# Patient Record
Sex: FEMALE | Race: WHITE | NOT HISPANIC OR LATINO | Employment: FULL TIME | URBAN - METROPOLITAN AREA
[De-identification: names, ages, dates, MRNs, and addresses within clinical notes are randomized per-mention and may not be internally consistent; named-entity substitution may affect disease eponyms.]

---

## 2017-03-13 ENCOUNTER — TELEPHONE (OUTPATIENT)
Dept: OBSTETRICS AND GYNECOLOGY | Age: 27
End: 2017-03-13

## 2017-03-13 NOTE — TELEPHONE ENCOUNTER
----- Message from Claudia Fong sent at 3/13/2017  8:55 AM EDT -----  Ninoska pt    Pt has been experiencing lower abdomen pain/ extreme cramping for about a week now. She says her breast are now having sharp pains in them and feel very swollen/full. She has not taken a pregnancy test because she is not sexually active. She has no history of ovarian cyst.  She went to the urgent care and they are treating it as a bladder infection. Please advise     Pt#- [phone]

## 2017-03-15 ENCOUNTER — OFFICE VISIT (OUTPATIENT)
Dept: OBSTETRICS AND GYNECOLOGY | Age: 27
End: 2017-03-15

## 2017-03-15 VITALS
DIASTOLIC BLOOD PRESSURE: 84 MMHG | BODY MASS INDEX: 20.41 KG/M2 | WEIGHT: 127 LBS | HEIGHT: 66 IN | SYSTOLIC BLOOD PRESSURE: 128 MMHG

## 2017-03-15 DIAGNOSIS — Z80.41 FAMILY HISTORY OF OVARIAN CANCER: ICD-10-CM

## 2017-03-15 DIAGNOSIS — R10.84 GENERALIZED ABDOMINAL PAIN: Primary | ICD-10-CM

## 2017-03-15 DIAGNOSIS — Z11.3 SCREEN FOR STD (SEXUALLY TRANSMITTED DISEASE): ICD-10-CM

## 2017-03-15 DIAGNOSIS — L65.9 HAIR LOSS: ICD-10-CM

## 2017-03-15 DIAGNOSIS — K59.09 OTHER CONSTIPATION: ICD-10-CM

## 2017-03-15 LAB
B-HCG UR QL: NEGATIVE
BASOPHILS # BLD AUTO: 0.04 10*3/MM3 (ref 0–0.2)
BASOPHILS NFR BLD AUTO: 0.5 % (ref 0–1.5)
BILIRUB BLD-MCNC: NEGATIVE MG/DL
CLARITY, POC: CLEAR
COLOR UR: YELLOW
EOSINOPHIL # BLD AUTO: 0.13 10*3/MM3 (ref 0–0.7)
EOSINOPHIL NFR BLD AUTO: 1.8 % (ref 0.3–6.2)
ERYTHROCYTE [DISTWIDTH] IN BLOOD BY AUTOMATED COUNT: 12.5 % (ref 11.7–13)
GLUCOSE UR STRIP-MCNC: NEGATIVE MG/DL
HCT VFR BLD AUTO: 44.9 % (ref 35.6–45.5)
HGB BLD-MCNC: 15 G/DL (ref 11.9–15.5)
IMM GRANULOCYTES # BLD: 0 10*3/MM3 (ref 0–0.03)
IMM GRANULOCYTES NFR BLD: 0 % (ref 0–0.5)
INTERNAL NEGATIVE CONTROL: NEGATIVE
INTERNAL POSITIVE CONTROL: POSITIVE
KETONES UR QL: ABNORMAL
LEUKOCYTE EST, POC: NEGATIVE
LYMPHOCYTES # BLD AUTO: 2.08 10*3/MM3 (ref 0.9–4.8)
LYMPHOCYTES NFR BLD AUTO: 28.1 % (ref 19.6–45.3)
Lab: NORMAL
MCH RBC QN AUTO: 30.3 PG (ref 26.9–32)
MCHC RBC AUTO-ENTMCNC: 33.4 G/DL (ref 32.4–36.3)
MCV RBC AUTO: 90.7 FL (ref 80.5–98.2)
MONOCYTES # BLD AUTO: 0.66 10*3/MM3 (ref 0.2–1.2)
MONOCYTES NFR BLD AUTO: 8.9 % (ref 5–12)
NEUTROPHILS # BLD AUTO: 4.49 10*3/MM3 (ref 1.9–8.1)
NEUTROPHILS NFR BLD AUTO: 60.7 % (ref 42.7–76)
NITRITE UR-MCNC: NEGATIVE MG/ML
PH UR: 6 [PH] (ref 5–8)
PLATELET # BLD AUTO: 204 10*3/MM3 (ref 140–500)
PROT UR STRIP-MCNC: NEGATIVE MG/DL
RBC # BLD AUTO: 4.95 10*6/MM3 (ref 3.9–5.2)
RBC # UR STRIP: NEGATIVE /UL
SP GR UR: 1.02 (ref 1–1.03)
TSH SERPL DL<=0.005 MIU/L-ACNC: 2.12 MIU/ML (ref 0.27–4.2)
UROBILINOGEN UR QL: NORMAL
WBC # BLD AUTO: 7.4 10*3/MM3 (ref 4.5–10.7)

## 2017-03-15 PROCEDURE — 99213 OFFICE O/P EST LOW 20 MIN: CPT | Performed by: PHYSICIAN ASSISTANT

## 2017-03-15 PROCEDURE — 81002 URINALYSIS NONAUTO W/O SCOPE: CPT | Performed by: PHYSICIAN ASSISTANT

## 2017-03-15 PROCEDURE — 81025 URINE PREGNANCY TEST: CPT | Performed by: PHYSICIAN ASSISTANT

## 2017-03-15 NOTE — PROGRESS NOTES
"Subjective     Chief Complaint   Patient presents with   • Abdominal Pain   • Breast Pain       Bogdan Garcia is a 26 y.o. No obstetric history on file. whose LMP is Patient's last menstrual period was 02/22/2017 (approximate). presents with pelvic pain and cramping, \"like contractions\".  She has had it for 3 wks, since her period ended.  She was seen at  and dxed with UTI and put on macrobid.  She has been taking it.  She then started with breast pain that started recently as well.  She denies risk of pregnancy but did take a UPT and it was negative.  She does note that she has breast enlargement week prior to her period.  She is not due for her period until the end of the month (LMP says 2/22 but she says it actually started end of Feb to beginning of march.      She \"always has problems with her bowels\".  Has BM's q 4-5 days.  No change in her diet.  Does admit to a \"ton of coffee\".         Additional Complaints:  Constipation  Patient complains of constipation. Onset was years ago. Patient has been having rare BM's  per week. Defecation has been Co-Morbid conditions:none. Symptoms have stabilized. Current Health Habits: Eating fiber? yes - with diet, Exercise? yes - occl, Adequate hydration? no. Current over the counter/prescription laxative: none which has been ineffective.        The following portions of the patient's history were reviewed and updated as appropriate:vital signs, allergies, current medications, past medical history, past social history, past surgical history and problem list      Review of Systems   Pertinent items are noted in HPI.     Objective        Visit Vitals   • /84   • Ht 66\" (167.6 cm)   • Wt 127 lb (57.6 kg)   • LMP 02/22/2017 (Approximate)   • Breastfeeding No   • BMI 20.5 kg/m2       Physical Exam    General:   alert, comfortable and no distress   Heart: Not performed today   Lungs: Not performed today.   Breast: Inspection negative, No nipple retraction or dimpling, No " nipple discharge or bleeding, Normal to palpation without dominant masses, dense tissue B, no discrete masses noted   Neck: na   Abdomen: {soft, non-tender. Bowel sounds normal. No masses,  no organomegaly   CVA: Not performed today   Pelvis: Vulva and vagina appear normal. Bimanual exam reveals normal uterus and adnexa.  Cervix: culture obtained   Extremities: Not performed today   Neurologic: negative   Psychiatric: Normal affect, judgement, and mood       Lab Review   Labs: Urine pregnancy test, Urinalysis - with micro     Imaging   Ultrasound - Pelvic Vaginal    Assessment/Plan     ASSESSMENT  1. Generalized abdominal pain    2. Other constipation    3. Hair loss    4. Screen for STD (sexually transmitted disease)    5. Family history of ovarian cancer        PLAN  1.   Orders Placed This Encounter   Procedures   • NuSwab VG+   • TSH   • POC Urinalysis Dipstick, Multipro   • POC Pregnancy, Urine       2. R/o appendicitis, STD exposure.  Already neg for urine infection.  Plan pelvic u/s to eval ovaries and r/o cyst  3. Disc breast tenderness, suspect this is hormonal and advised to track when it occurs in relation to her cycles.  Also encouraged to d/c her coffee.  Call if pain persists and can plan u/s  4. Disc contraception, advised to consider BCP as it would potentially decrease her breast tenderness and reduce her risk of ovarian cancer.  Disc myrisk, pt wants to consider, I gave her a hand out.  Says her Mom won't get tested          Follow up: 2 month(s)    AL Ivan  3/15/2017

## 2017-03-18 LAB
A VAGINAE DNA VAG QL NAA+PROBE: NORMAL SCORE
BVAB2 DNA VAG QL NAA+PROBE: NORMAL SCORE
C ALBICANS DNA VAG QL NAA+PROBE: NEGATIVE
C GLABRATA DNA VAG QL NAA+PROBE: NEGATIVE
C TRACH RRNA SPEC QL NAA+PROBE: NEGATIVE
MEGA1 DNA VAG QL NAA+PROBE: NORMAL SCORE
N GONORRHOEA RRNA SPEC QL NAA+PROBE: NEGATIVE
T VAGINALIS RRNA SPEC QL NAA+PROBE: NEGATIVE

## 2017-03-21 ENCOUNTER — PROCEDURE VISIT (OUTPATIENT)
Dept: OBSTETRICS AND GYNECOLOGY | Age: 27
End: 2017-03-21

## 2017-03-21 ENCOUNTER — OFFICE VISIT (OUTPATIENT)
Dept: OBSTETRICS AND GYNECOLOGY | Age: 27
End: 2017-03-21

## 2017-03-21 VITALS — SYSTOLIC BLOOD PRESSURE: 104 MMHG | DIASTOLIC BLOOD PRESSURE: 60 MMHG | HEIGHT: 66 IN

## 2017-03-21 DIAGNOSIS — R10.2 PELVIC PAIN: Primary | ICD-10-CM

## 2017-03-21 DIAGNOSIS — R10.84 GENERALIZED ABDOMINAL PAIN: ICD-10-CM

## 2017-03-21 DIAGNOSIS — Z80.41 FAMILY HISTORY OF OVARIAN CANCER: Primary | ICD-10-CM

## 2017-03-21 PROCEDURE — 76830 TRANSVAGINAL US NON-OB: CPT | Performed by: OBSTETRICS & GYNECOLOGY

## 2017-03-21 PROCEDURE — 99213 OFFICE O/P EST LOW 20 MIN: CPT | Performed by: PHYSICIAN ASSISTANT

## 2017-03-21 NOTE — PROGRESS NOTES
"Subjective     Chief Complaint   Patient presents with   • Abdominal Pain     gyn u/s       Bogdan Garcia is a 26 y.o. No obstetric history on file. whose LMP is Patient's last menstrual period was 02/21/2017. presents for f/u u/s.  She saw me last week for unusual pelvic pain.  We did a vaginal swab, UA, CBC and TSH as well as a UPT.  All of it was nl/neg.  She did start her period recently.        No Additional Complaints Reported    The following portions of the patient's history were reviewed and updated as appropriate:vital signs, allergies, current medications, past medical history, past social history, past surgical history and problem list      Review of Systems   Pertinent items are noted in HPI.     Objective        Visit Vitals   • /60   • Ht 66\" (167.6 cm)   • LMP 02/21/2017       Physical Exam    General:   alert, comfortable and no distress   Heart: Not performed today   Lungs: Not performed today.   Breast: Not performed today   Neck: na   Abdomen: {Not performed today   CVA: Not performed today   Pelvis: Not performed today   Extremities: Not performed today   Neurologic: negative   Psychiatric: Normal affect, judgement, and mood       Lab Review   Labs: TSH, CBC, UPT, UA    Imaging   Ultrasound - Pelvic Vaginal, u/s done and shows an endometrial lining of 11.47 mm.  B ovaries are wnl    Assessment/Plan     ASSESSMENT  1. Pelvic pain        PLAN  1. Disc that her belly pain/discomfort could be r/t IBS or some other GI related issue.  Discussed an elimination diet and a f/u with PCP.  Also advised to track her cycles on an jessenia so she can know better when she is ovulating or due for her cycle.  Offered OCP's to help with cycle control and potentially decrease the ovulation pain, however pt prefers to avoid them for now as she is not SA and has had issues with BCP in the past (mood changes)          Follow up: 8 week(s) for annual    AL Ivan  3/21/2017           "

## 2017-05-11 RX ORDER — FLUDROCORTISONE ACETATE 0.1 MG/1
TABLET ORAL
Qty: 60 TABLET | Refills: 0 | Status: SHIPPED | OUTPATIENT
Start: 2017-05-11 | End: 2017-07-11 | Stop reason: SDUPTHER

## 2017-05-30 ENCOUNTER — OFFICE VISIT (OUTPATIENT)
Dept: OBSTETRICS AND GYNECOLOGY | Age: 27
End: 2017-05-30

## 2017-05-30 VITALS
BODY MASS INDEX: 20.57 KG/M2 | SYSTOLIC BLOOD PRESSURE: 110 MMHG | DIASTOLIC BLOOD PRESSURE: 80 MMHG | WEIGHT: 128 LBS | HEIGHT: 66 IN

## 2017-05-30 DIAGNOSIS — K59.09 OTHER CONSTIPATION: ICD-10-CM

## 2017-05-30 DIAGNOSIS — R10.2 PELVIC PAIN: Primary | ICD-10-CM

## 2017-05-30 DIAGNOSIS — Z01.419 WELL WOMAN EXAM WITH ROUTINE GYNECOLOGICAL EXAM: ICD-10-CM

## 2017-05-30 PROCEDURE — 99395 PREV VISIT EST AGE 18-39: CPT | Performed by: PHYSICIAN ASSISTANT

## 2017-06-01 LAB
CONV .: NORMAL
CYTOLOGIST CVX/VAG CYTO: NORMAL
CYTOLOGY CVX/VAG DOC THIN PREP: NORMAL
DX ICD CODE: NORMAL
HIV 1 & 2 AB SER-IMP: NORMAL
OTHER STN SPEC: NORMAL
PATH REPORT.FINAL DX SPEC: NORMAL
STAT OF ADQ CVX/VAG CYTO-IMP: NORMAL

## 2017-07-11 RX ORDER — FLUDROCORTISONE ACETATE 0.1 MG/1
TABLET ORAL
Qty: 180 TABLET | Refills: 0 | Status: SHIPPED | OUTPATIENT
Start: 2017-07-11 | End: 2018-01-08 | Stop reason: SDUPTHER

## 2017-09-01 ENCOUNTER — TELEPHONE (OUTPATIENT)
Dept: OBSTETRICS AND GYNECOLOGY | Age: 27
End: 2017-09-01

## 2017-09-01 RX ORDER — METRONIDAZOLE 500 MG/1
500 TABLET ORAL 2 TIMES DAILY
Qty: 14 TABLET | Refills: 0 | Status: SHIPPED | OUTPATIENT
Start: 2017-09-01 | End: 2017-09-08

## 2017-09-01 NOTE — TELEPHONE ENCOUNTER
"Dr Goyal pt, has complaints of a strong vag fishy/sour odor. Pt states it hits her as soon as she pulls her pants down. States a huge increase in her normal clear DC, denies any itch/burn. States that she has been seen several time over the last few months and \"always test positive for that infection\" but is not sure what the infection is called. Pt's labs show 2 vag swabs over last year and both are neg, so Im not sure. Pt is requesting \"that abx again\" but is not sure of what abx and I don't see any listed in her meds. Pt states she doesn't want to come in for a swab since it's always the same. Please advise, pharm on file.    Pt # [phone]  "

## 2018-01-08 RX ORDER — FLUDROCORTISONE ACETATE 0.1 MG/1
TABLET ORAL
Qty: 180 TABLET | Refills: 0 | Status: SHIPPED | OUTPATIENT
Start: 2018-01-08 | End: 2018-02-05 | Stop reason: SDUPTHER

## 2018-02-05 ENCOUNTER — OFFICE VISIT (OUTPATIENT)
Dept: SPORTS MEDICINE | Facility: CLINIC | Age: 28
End: 2018-02-05

## 2018-02-05 VITALS
HEIGHT: 66 IN | TEMPERATURE: 97.9 F | OXYGEN SATURATION: 99 % | HEART RATE: 68 BPM | DIASTOLIC BLOOD PRESSURE: 74 MMHG | BODY MASS INDEX: 20.41 KG/M2 | WEIGHT: 127 LBS | SYSTOLIC BLOOD PRESSURE: 112 MMHG

## 2018-02-05 DIAGNOSIS — R90.89 ABNORMAL BRAIN MRI: ICD-10-CM

## 2018-02-05 DIAGNOSIS — F41.0 PANIC DISORDER: ICD-10-CM

## 2018-02-05 DIAGNOSIS — R41.0 TRANSIENT DISORIENTATION: ICD-10-CM

## 2018-02-05 DIAGNOSIS — G90.9 DISORDER OF AUTONOMIC NERVOUS SYSTEM: Primary | ICD-10-CM

## 2018-02-05 PROCEDURE — 99214 OFFICE O/P EST MOD 30 MIN: CPT | Performed by: FAMILY MEDICINE

## 2018-02-05 RX ORDER — FLUDROCORTISONE ACETATE 0.1 MG/1
0.1 TABLET ORAL DAILY
Qty: 180 TABLET | Refills: 0
Start: 2018-02-05 | End: 2018-08-22

## 2018-02-05 RX ORDER — SERTRALINE HYDROCHLORIDE 25 MG/1
25 TABLET, FILM COATED ORAL DAILY
Qty: 30 TABLET | Refills: 5 | Status: SHIPPED | OUTPATIENT
Start: 2018-02-05 | End: 2019-01-02 | Stop reason: SDUPTHER

## 2018-02-05 NOTE — PROGRESS NOTES
"Bogdan is a 27 y.o. year old female    Chief Complaint   Patient presents with   • Dizziness   • Shortness of Breath       History of Present Illness   HPI   2/1/2018 woke up from sleep in a sweat then began to note perioral numbness and feeling like she was going to pass out. Has noted for few days before that episode and since then has noted episodes  of still feeling like she is going to pass out and trouble finding the correct words. Had a relative with a stroke at the age of 32. Patient has had abnormal MRI in the past, 3 areas of increased T2 signal in right frontal lobe.     I have reviewed the patient's medical, family, and social history in detail and updated the computerized patient record.    Review of Systems   Constitutional: Negative.    HENT: Negative.    Respiratory: Negative.    Cardiovascular: Negative.    Gastrointestinal: Negative.    Genitourinary: Negative.    Neurological:        Per HPI   Psychiatric/Behavioral: Positive for sleep disturbance. Negative for self-injury and suicidal ideas. The patient is nervous/anxious.         Periods of disorientation described       /74  Pulse 68  Temp 97.9 °F (36.6 °C)  Ht 167.6 cm (66\")  Wt 57.6 kg (127 lb)  SpO2 99%  BMI 20.5 kg/m2     Physical Exam   Constitutional: She is oriented to person, place, and time. She appears well-developed and well-nourished.   HENT:   Head: Normocephalic and atraumatic.   Eyes: Conjunctivae and EOM are normal. Pupils are equal, round, and reactive to light.   Cardiovascular: Normal rate, regular rhythm and normal heart sounds.    No peripheral edema   Pulmonary/Chest: Effort normal and breath sounds normal.   Neurological: She is alert and oriented to person, place, and time. She exhibits normal muscle tone. Coordination normal.   Skin: Skin is warm and dry.   Psychiatric: Her behavior is normal.   Anxious, No SI or HI   Vitals reviewed.       Diagnoses and all orders for this visit:    Disorder of autonomic " nervous system  -     fludrocortisone 0.1 MG tablet; Take 1 tablet by mouth Daily.    Abnormal brain MRI  -     MRI brain wo contrast; Future    Panic disorder  -     sertraline (ZOLOFT) 25 MG tablet; Take 1 tablet by mouth Daily.    Transient disorientation  -     MRI brain wo contrast; Future         Recheck one month and if sertraline is helpful then will plan to treat for about 4-6 months.  I spent greater than 50% of this 25 minute visit discussing the diagnosis, prognosis, treatment plan, etc. Patient's questions were answered in detail with appropriate counseling and anticipatory guidance.     EMR Dragon/Transcription disclaimer:    Much of this encounter note is an electronic transcription/translation of spoken language to printed text.  The electronic translation of spoken language may permit erroneous, or at times, nonsensical words or phrases to be inadvertently transcribed.  Although I have reviewed the note for such errors some may still exist.

## 2018-02-10 ENCOUNTER — HOSPITAL ENCOUNTER (OUTPATIENT)
Dept: MRI IMAGING | Facility: HOSPITAL | Age: 28
Discharge: HOME | End: 2018-02-10
Admitting: FAMILY MEDICINE

## 2018-02-10 DIAGNOSIS — R90.89 ABNORMAL BRAIN MRI: ICD-10-CM

## 2018-02-10 DIAGNOSIS — R41.0 TRANSIENT DISORIENTATION: ICD-10-CM

## 2018-02-10 PROCEDURE — A9577 INJ MULTIHANCE: HCPCS | Performed by: FAMILY MEDICINE

## 2018-02-10 PROCEDURE — 0 GADOBENATE DIMEGLUMINE 529 MG/ML SOLUTION: Performed by: FAMILY MEDICINE

## 2018-02-10 PROCEDURE — 70553 MRI BRAIN STEM W/O & W/DYE: CPT

## 2018-02-10 PROCEDURE — 82565 ASSAY OF CREATININE: CPT

## 2018-02-10 RX ADMIN — GADOBENATE DIMEGLUMINE 11 ML: 529 INJECTION, SOLUTION INTRAVENOUS at 13:30

## 2018-02-12 LAB — CREAT BLDA-MCNC: 0.9 MG/DL (ref 0.6–1.3)

## 2018-05-03 ENCOUNTER — OFFICE VISIT (OUTPATIENT)
Dept: OBSTETRICS AND GYNECOLOGY | Age: 28
End: 2018-05-03

## 2018-05-03 VITALS
HEIGHT: 66 IN | WEIGHT: 132 LBS | DIASTOLIC BLOOD PRESSURE: 70 MMHG | SYSTOLIC BLOOD PRESSURE: 110 MMHG | BODY MASS INDEX: 21.21 KG/M2

## 2018-05-03 DIAGNOSIS — N76.0 ACUTE VAGINITIS: Primary | ICD-10-CM

## 2018-05-03 PROCEDURE — 99213 OFFICE O/P EST LOW 20 MIN: CPT | Performed by: NURSE PRACTITIONER

## 2018-05-03 RX ORDER — FLUCONAZOLE 150 MG/1
150 TABLET ORAL
Qty: 2 TABLET | Refills: 0 | Status: SHIPPED | OUTPATIENT
Start: 2018-05-03 | End: 2019-01-02

## 2018-05-03 RX ORDER — AMOXICILLIN AND CLAVULANATE POTASSIUM 875; 125 MG/1; MG/1
TABLET, FILM COATED ORAL
Refills: 0 | COMMUNITY
Start: 2018-04-29 | End: 2018-08-22

## 2018-05-03 NOTE — PROGRESS NOTES
"Cassidy Garcia is a 27 y.o. female is being seen today for   Chief Complaint   Patient presents with   • Follow-up     Pt thinks she had an allergic reaction to Monistat   .    History of Present Illness     Patient here for vaginal itching and irritation as well as needing STD testing.  She had unprotected sex about 2 weeks ago. She has also been on oral antibiotics.  She started noticing itching and irritation a few days ago.  She bought OTC monistat but when she used it she started having significant burning and pain.  She has white discharge as well.  She has not seen any lesions or sores.  She is leaving for [place] and going to [employer] for the next few days.  She declines serum testing but would like vaginal std testing.    The following portions of the patient's history were reviewed and updated as appropriate: allergies, current medications, past family history, past medical history, past social history, past surgical history and problem list.    /70   Ht 167.6 cm (66\")   Wt 59.9 kg (132 lb)   LMP 04/29/2018   BMI 21.31 kg/m²         Review of Systems   Constitutional: Negative.    HENT: Negative.    Eyes: Negative.    Respiratory: Negative.    Cardiovascular: Negative.    Gastrointestinal: Negative.    Endocrine: Negative.    Genitourinary: Positive for vaginal discharge and vaginal pain.        Vaginal itching   Musculoskeletal: Negative.    Skin: Negative.    Allergic/Immunologic: Negative.    Neurological: Negative.    Hematological: Negative.    Psychiatric/Behavioral: Negative.        Objective   Physical Exam   Constitutional: She is oriented to person, place, and time. She appears well-developed and well-nourished.   Genitourinary: Uterus normal. There is no lesion on the right labia. There is no lesion on the left labia. Uterus is not tender. Cervix exhibits no motion tenderness, no discharge and no friability. There is erythema in the vagina. No bleeding in the " vagina. No foreign body in the vagina. No signs of injury around the vagina.   Genitourinary Comments: Thick, white discharge and erythema noted   Neurological: She is alert and oriented to person, place, and time.   Psychiatric: She has a normal mood and affect. Her behavior is normal.         Assessment/Plan   Bogdan was seen today for follow-up.    Diagnoses and all orders for this visit:    Acute vaginitis    Other orders  -     fluconazole (DIFLUCAN) 150 MG tablet; Take 1 tablet by mouth Every 3 (Three) Days.        Culture sent.  Diflucan sent to pharmacy with instructions.  Discussed keeping area and clean and dry as possible.  Will call with results.

## 2018-05-08 ENCOUNTER — TELEPHONE (OUTPATIENT)
Dept: OBSTETRICS AND GYNECOLOGY | Age: 28
End: 2018-05-08

## 2018-05-08 LAB
A VAGINAE DNA VAG QL NAA+PROBE: ABNORMAL SCORE
BVAB2 DNA VAG QL NAA+PROBE: ABNORMAL SCORE
C ALBICANS DNA VAG QL NAA+PROBE: POSITIVE
C GLABRATA DNA VAG QL NAA+PROBE: NEGATIVE
C TRACH RRNA SPEC QL NAA+PROBE: NEGATIVE
MEGA1 DNA VAG QL NAA+PROBE: ABNORMAL SCORE
N GONORRHOEA RRNA SPEC QL NAA+PROBE: NEGATIVE
T VAGINALIS RRNA SPEC QL NAA+PROBE: NEGATIVE

## 2018-05-08 NOTE — TELEPHONE ENCOUNTER
----- Message from KEILA Roca sent at 5/8/2018  3:53 PM EDT -----  Notify + yeast, patient was treated at visit.

## 2018-06-29 RX ORDER — FLUDROCORTISONE ACETATE 0.1 MG/1
TABLET ORAL
Qty: 180 TABLET | Refills: 0 | Status: SHIPPED | OUTPATIENT
Start: 2018-06-29 | End: 2018-08-22

## 2018-08-09 ENCOUNTER — TELEPHONE (OUTPATIENT)
Dept: OBSTETRICS AND GYNECOLOGY | Age: 28
End: 2018-08-09

## 2018-08-09 NOTE — TELEPHONE ENCOUNTER
Tell patient this probably not much we can do now.  If she would like to be checked she make appointment with Dr. Xiao but otherwise I would just wait and see what happens again

## 2018-08-21 ENCOUNTER — TELEPHONE (OUTPATIENT)
Dept: OBSTETRICS AND GYNECOLOGY | Age: 28
End: 2018-08-21

## 2018-08-21 NOTE — TELEPHONE ENCOUNTER
Dr Goyal pt, exp low abd cramping/bloating, pt states it feels like BV/UTI, sx started 2 days ago, has a d/c with an odor -fishy, burns with urination, some frequency, no itch, pt sates she take a lot of baths with perfumes, pt requesting a Rx to be sent to her pharm allergic to monistat. =Please advise

## 2018-08-22 ENCOUNTER — OFFICE VISIT (OUTPATIENT)
Dept: OBSTETRICS AND GYNECOLOGY | Age: 28
End: 2018-08-22

## 2018-08-22 VITALS
BODY MASS INDEX: 21.86 KG/M2 | HEIGHT: 66 IN | SYSTOLIC BLOOD PRESSURE: 116 MMHG | WEIGHT: 136 LBS | DIASTOLIC BLOOD PRESSURE: 64 MMHG

## 2018-08-22 DIAGNOSIS — N89.8 VAGINAL DISCHARGE: ICD-10-CM

## 2018-08-22 DIAGNOSIS — Z80.3 FAMILY HISTORY OF BREAST CANCER: ICD-10-CM

## 2018-08-22 DIAGNOSIS — Z01.419 WELL WOMAN EXAM WITH ROUTINE GYNECOLOGICAL EXAM: ICD-10-CM

## 2018-08-22 DIAGNOSIS — R35.0 URINARY FREQUENCY: Primary | ICD-10-CM

## 2018-08-22 LAB
BILIRUB BLD-MCNC: NEGATIVE MG/DL
CLARITY, POC: CLEAR
COLOR UR: YELLOW
GLUCOSE UR STRIP-MCNC: NEGATIVE MG/DL
KETONES UR QL: NEGATIVE
LEUKOCYTE EST, POC: NEGATIVE
NITRITE UR-MCNC: NEGATIVE MG/ML
PH UR: 6.5 [PH] (ref 5–8)
PROT UR STRIP-MCNC: NEGATIVE MG/DL
RBC # UR STRIP: NEGATIVE /UL
SP GR UR: 1.02 (ref 1–1.03)
UROBILINOGEN UR QL: NORMAL

## 2018-08-22 PROCEDURE — 81002 URINALYSIS NONAUTO W/O SCOPE: CPT | Performed by: PHYSICIAN ASSISTANT

## 2018-08-22 PROCEDURE — 99395 PREV VISIT EST AGE 18-39: CPT | Performed by: PHYSICIAN ASSISTANT

## 2018-08-22 RX ORDER — FLUDROCORTISONE ACETATE 0.1 MG/1
0.1 TABLET ORAL DAILY
Qty: 30 TABLET | Refills: 11
Start: 2018-08-22 | End: 2019-01-02 | Stop reason: SDUPTHER

## 2018-08-22 NOTE — PROGRESS NOTES
"Subjective     Chief Complaint   Patient presents with   • Vaginal Discharge       Bogdan Garcia is a 28 y.o.  whose LMP is Patient's last menstrual period was 08/15/2018. presents with ***      {Also Blocks:68072}    The following portions of the patient's history were reviewed and updated as appropriate:{history reviewed:89897::\"vital signs\",\"allergies\",\"current medications\",\"past medical history\",\"past social history\",\"past surgical history\",\"problem list\"}      Review of Systems   {ros - complete:79746}     Objective      /64   Ht 167.6 cm (66\")   Wt 61.7 kg (136 lb)   LMP 08/15/2018   BMI 21.95 kg/m²     Physical Exam    General:   {gen appearance:56762}   Heart: {EXAM; HEART:80749}   Lungs: {lung exam:09698}   Breast: {Exam; breast:42659}   Neck: {EXAM; NECK:09213}   Abdomen: {{EXAM; ABDOMEN:30025}   CVA: {CVA tenderness:41874}   Pelvis: {EXAM; PELVIC:42077}   Extremities: {EXAM; EXTREMITY:10837}   Neurologic: {NEURO:23094}   Psychiatric: {PSYCH:34366}       Lab Review   Labs: {abd pain lab:10360}     Imaging   {abd pain image:98380}    Assessment/Plan     ASSESSMENT  No diagnosis found.    PLAN  1. No orders of the defined types were placed in this encounter.      2. Medications prescribed this encounter:      No orders of the defined types were placed in this encounter.      3. ***    Follow up: {numbers 1-12:12164} {DAYS, WEEKS, MONTHS, YEARS:95685}    AL Ivan  2018           "

## 2018-08-22 NOTE — PROGRESS NOTES
Subjective     Chief Complaint   Patient presents with   • Vaginal Discharge       History of Present Illness    Bogdan Garcia is a 28 y.o.  who presents for annual exam.    Pt here for vaginal discharge    She is prone to constipation but using a probiotic vitamin and that has helped  I did refer to GI but pt never went b/c the probiotics worked  She is not SA and has not been for some time  She declines std testing as she had it done in May and not SA since then  She is not on BC and does not want to be on any at this time  She will call if that changes and I have encouraged condoms as well  Health is good  Son is 7 now, 2nd grade, attends [employer]  She is a pt of Dr Xiao    Her menses are regular every 28-30 days, lasting 0-3 days, dysmenorrhea none   Obstetric History:  OB History      Para Term  AB Living    1 0 0     1    SAB TAB Ectopic Molar Multiple Live Births              1         Menstrual History:     Patient's last menstrual period was 08/15/2018.         Current contraception: abstinence  History of abnormal Pap smear: no  Received Gardasil immunization: no  Perform regular self breast exam: no  Family history of uterine or ovarian cancer: no  Family History of colon cancer: no  Family history of breast cancer: no MGM had ovarian cancer, Mom is doing genetic testing currently    Mammogram: not indicated.  Colonoscopy: not indicated.  DEXA: not indicated.    Exercise: moderately active  Calcium/Vitamin D: adequate intake    The following portions of the patient's history were reviewed and updated as appropriate: allergies, current medications, past family history, past medical history, past social history, past surgical history and problem list.    Review of Systems   Genitourinary: Positive for urgency and vaginal discharge.       Review of Systems   Constitutional: Negative for fatigue.   Respiratory: Negative for shortness of breath.    Gastrointestinal: Negative for  "abdominal pain.   Genitourinary: Negative for dysuria.   Neurological: Negative for headaches.   Psychiatric/Behavioral: Negative for dysphoric mood.         Objective   Physical Exam    /64   Ht 167.6 cm (66\")   Wt 61.7 kg (136 lb)   LMP 08/15/2018   BMI 21.95 kg/m²     General:   alert, appears stated age and cooperative   Neck: no adenopathy and thyroid normal to palpation   Heart: regular rate and rhythm   Lungs: clear to auscultation bilaterally   Abdomen: soft and nontender   Breast: inspection negative, no nipple discharge or bleeding, no masses or nodularity palpable   Vulva: normal   Vagina: Clear d/c noted, no odor, culture obtained   Cervix: no lesions   Uterus: normal size, non-tender   Adnexa: normal adnexa and no mass, fullness, tenderness   Rectal: not indicated     Assessment/Plan   Bogdan was seen today for vaginal discharge.    Diagnoses and all orders for this visit:    Urinary frequency  -     POC Urinalysis Dipstick  -     Urine Culture - Urine, Urine, Clean Catch    Well woman exam with routine gynecological exam    Vaginal discharge  -     NuSwab BV & Candida - Swab, Vagina    Family history of breast cancer    Other orders  -     fludrocortisone 0.1 MG tablet; Take 1 tablet by mouth Daily.  -     Clindamycin Phosphate, 1 Dose, (CLINDESSE) 2 % vaginal cream; Apply 1 application topically to the appropriate area as directed 1 (One) Time for 1 dose.  -     Miscellaneous Vaginal Products (REPHRESH CLEAN BALANCE) kit; Insert 1 application into the vagina Daily As Needed (odor).        All questions answered.  Breast self exam technique reviewed and patient encouraged to perform self-exam monthly.  Discussed healthy lifestyle modifications.  Recommended 30 minutes of aerobic exercise five times per week.  Discussed calcium needs to prevent osteoporosis.    Disc pap guidelines, she is good until 2020  Disc genetic testing, she will wait for her Mom's results first  Call if she wants to " start contraception  Vaginal swab sent, call if results not received in 1 wk, also discussed [email or web address]

## 2018-08-23 ENCOUNTER — TELEPHONE (OUTPATIENT)
Dept: OBSTETRICS AND GYNECOLOGY | Age: 28
End: 2018-08-23

## 2018-08-23 NOTE — TELEPHONE ENCOUNTER
Dr Goyal pt, no PA today.    Was seen yesterday for BV, pt was given Clindess at appt, pt states she is now bleeding bright red, denies any pain. Please advise.    Pt # [phone]

## 2018-08-23 NOTE — TELEPHONE ENCOUNTER
Let pt know might be related to swab or the medicine, sometimes the cervix will bleed a little bit, call back for appt tomorrow if bleeding heavily (pad and hour) or severe pain,

## 2018-08-24 ENCOUNTER — TELEPHONE (OUTPATIENT)
Dept: OBSTETRICS AND GYNECOLOGY | Age: 28
End: 2018-08-24

## 2018-08-24 LAB
BACTERIA UR CULT: NO GROWTH
BACTERIA UR CULT: NORMAL

## 2018-08-24 NOTE — TELEPHONE ENCOUNTER
----- Message from AL Barker sent at 8/24/2018  9:11 AM EDT -----  Let her know her urine culture is negative for infection

## 2018-08-25 LAB
A VAGINAE DNA VAG QL NAA+PROBE: NORMAL SCORE
BVAB2 DNA VAG QL NAA+PROBE: NORMAL SCORE
C ALBICANS DNA VAG QL NAA+PROBE: NEGATIVE
C GLABRATA DNA VAG QL NAA+PROBE: NEGATIVE
MEGA1 DNA VAG QL NAA+PROBE: NORMAL SCORE

## 2018-12-18 RX ORDER — FLUDROCORTISONE ACETATE 0.1 MG/1
TABLET ORAL
Qty: 180 TABLET | Refills: 0 | Status: SHIPPED | OUTPATIENT
Start: 2018-12-18 | End: 2019-01-02 | Stop reason: SDUPTHER

## 2019-01-02 ENCOUNTER — OFFICE VISIT (OUTPATIENT)
Dept: SPORTS MEDICINE | Facility: CLINIC | Age: 29
End: 2019-01-02

## 2019-01-02 VITALS
OXYGEN SATURATION: 99 % | SYSTOLIC BLOOD PRESSURE: 84 MMHG | HEIGHT: 66 IN | WEIGHT: 133 LBS | BODY MASS INDEX: 21.38 KG/M2 | TEMPERATURE: 97.8 F | HEART RATE: 58 BPM | DIASTOLIC BLOOD PRESSURE: 62 MMHG

## 2019-01-02 DIAGNOSIS — N30.00 ACUTE CYSTITIS WITHOUT HEMATURIA: Primary | ICD-10-CM

## 2019-01-02 DIAGNOSIS — G43.009 ATYPICAL MIGRAINE: ICD-10-CM

## 2019-01-02 DIAGNOSIS — R30.0 DYSURIA: ICD-10-CM

## 2019-01-02 DIAGNOSIS — G90.9 DISORDER OF AUTONOMIC NERVOUS SYSTEM: ICD-10-CM

## 2019-01-02 DIAGNOSIS — F41.0 PANIC DISORDER: ICD-10-CM

## 2019-01-02 DIAGNOSIS — R35.0 FREQUENT URINATION: Primary | ICD-10-CM

## 2019-01-02 LAB
BILIRUB BLD-MCNC: NEGATIVE MG/DL
CLARITY, POC: CLEAR
COLOR UR: YELLOW
GLUCOSE UR STRIP-MCNC: NEGATIVE MG/DL
KETONES UR QL: NEGATIVE
LEUKOCYTE EST, POC: NEGATIVE
NITRITE UR-MCNC: NEGATIVE MG/ML
PH UR: 7 [PH] (ref 5–8)
PROT UR STRIP-MCNC: NEGATIVE MG/DL
RBC # UR STRIP: ABNORMAL /UL
SP GR UR: 1.01 (ref 1–1.03)
UROBILINOGEN UR QL: NORMAL

## 2019-01-02 PROCEDURE — 99214 OFFICE O/P EST MOD 30 MIN: CPT | Performed by: FAMILY MEDICINE

## 2019-01-02 PROCEDURE — 81003 URINALYSIS AUTO W/O SCOPE: CPT | Performed by: FAMILY MEDICINE

## 2019-01-02 RX ORDER — SERTRALINE HYDROCHLORIDE 25 MG/1
25 TABLET, FILM COATED ORAL DAILY
Qty: 90 TABLET | Refills: 3 | Status: SHIPPED | OUTPATIENT
Start: 2019-01-02 | End: 2020-01-20

## 2019-01-02 RX ORDER — NITROFURANTOIN 25; 75 MG/1; MG/1
100 CAPSULE ORAL 2 TIMES DAILY
Qty: 20 CAPSULE | Refills: 0 | OUTPATIENT
Start: 2019-01-02 | End: 2019-05-19

## 2019-01-02 RX ORDER — FLUDROCORTISONE ACETATE 0.1 MG/1
0.15 TABLET ORAL DAILY
Qty: 135 TABLET | Refills: 3
Start: 2019-01-02 | End: 2019-06-14 | Stop reason: SDUPTHER

## 2019-01-02 RX ORDER — PREDNISONE 20 MG/1
TABLET ORAL
Refills: 0 | COMMUNITY
Start: 2018-11-06 | End: 2019-01-02

## 2019-01-02 NOTE — PROGRESS NOTES
"Bogdan is a 28 y.o. year old female    Chief Complaint   Patient presents with   • Headache   • Dizziness   • Difficulty Urinating   • Urinary Frequency       History of Present Illness   HPI   1.  Follow-up disorder autonomic nervous system.  She has found that the fludrocortisone works better and 1 in the morning and half tablet at night.  2.  Follow-up atypical migraines, they still occur however not as significant as previously.  3.  Follow-up anxiety, patient feels the sertraline 25 mg is working well.  No SI or HI.  4.  Over the past 24 hours patient has had dysuria and frequency.  No fever or chills.  I have reviewed the patient's medical, family, and social history in detail and updated the computerized patient record.    Review of Systems   Constitutional: Negative.    HENT: Negative.    Eyes: Negative.    Respiratory: Negative.    Cardiovascular: Negative.    Gastrointestinal: Negative.    Genitourinary: Positive for dysuria and frequency.   Neurological: Positive for dizziness.   Psychiatric/Behavioral: Negative for self-injury and suicidal ideas. The patient is nervous/anxious.        BP (!) 84/62 (BP Location: Left arm, Patient Position: Sitting, Cuff Size: Adult)   Pulse 58   Temp 97.8 °F (36.6 °C) (Oral)   Ht 167.6 cm (65.98\")   Wt 60.3 kg (133 lb)   SpO2 99%   BMI 21.48 kg/m²      Physical Exam   Constitutional: She is oriented to person, place, and time. She appears well-developed and well-nourished.   HENT:   Head: Normocephalic and atraumatic.   Eyes: Conjunctivae and EOM are normal. Pupils are equal, round, and reactive to light.   Cardiovascular: Normal rate, regular rhythm and normal heart sounds.   No peripheral edema   Pulmonary/Chest: Effort normal and breath sounds normal.   Neurological: She is alert and oriented to person, place, and time.   Skin: Skin is warm and dry.   Psychiatric: She has a normal mood and affect. Her behavior is normal.   Vitals reviewed.       Diagnoses and all " orders for this visit:    Acute cystitis without hematuria  -     fludrocortisone 0.1 MG tablet; Take 1.5 tablets by mouth Daily.  -     sertraline (ZOLOFT) 25 MG tablet; Take 1 tablet by mouth Daily.  -     CBC & Differential  -     Comprehensive Metabolic Panel  -     TSH  -     T4, Free  -     UA / M With / Rflx Culture([facility] ONLY) - Urine, Clean Catch  -     Lipid Panel With / Chol / HDL Ratio  -     nitrofurantoin, macrocrystal-monohydrate, (MACROBID) 100 MG capsule; Take 1 capsule by mouth 2 (Two) Times a Day.    Atypical migraine  -     fludrocortisone 0.1 MG tablet; Take 1.5 tablets by mouth Daily.  -     sertraline (ZOLOFT) 25 MG tablet; Take 1 tablet by mouth Daily.  -     CBC & Differential  -     Comprehensive Metabolic Panel  -     TSH  -     T4, Free  -     UA / M With / Rflx Culture([facility] ONLY) - Urine, Clean Catch  -     Lipid Panel With / Chol / HDL Ratio  -     nitrofurantoin, macrocrystal-monohydrate, (MACROBID) 100 MG capsule; Take 1 capsule by mouth 2 (Two) Times a Day.    Disorder of autonomic nervous system  -     fludrocortisone 0.1 MG tablet; Take 1.5 tablets by mouth Daily.  -     sertraline (ZOLOFT) 25 MG tablet; Take 1 tablet by mouth Daily.  -     CBC & Differential  -     Comprehensive Metabolic Panel  -     TSH  -     T4, Free  -     UA / M With / Rflx Culture([facility] ONLY) - Urine, Clean Catch  -     Lipid Panel With / Chol / HDL Ratio  -     nitrofurantoin, macrocrystal-monohydrate, (MACROBID) 100 MG capsule; Take 1 capsule by mouth 2 (Two) Times a Day.    Panic disorder  -     fludrocortisone 0.1 MG tablet; Take 1.5 tablets by mouth Daily.  -     sertraline (ZOLOFT) 25 MG tablet; Take 1 tablet by mouth Daily.  -     CBC & Differential  -     Comprehensive Metabolic Panel  -     TSH  -     T4, Free  -     UA / M With / Rflx Culture([facility] ONLY) - Urine, Clean Catch  -     Lipid Panel With / Chol / HDL Ratio  -     nitrofurantoin, macrocrystal-monohydrate, (MACROBID) 100 MG  capsule; Take 1 capsule by mouth 2 (Two) Times a Day.    Other orders  -     Discontinue: predniSONE (DELTASONE) 20 MG tablet; TK ONE T PO BID FOR 7 DAYS             EMR Dragon/Transcription disclaimer:    Much of this encounter note is an electronic transcription/translation of spoken language to printed text.  The electronic translation of spoken language may permit erroneous, or at times, nonsensical words or phrases to be inadvertently transcribed.  Although I have reviewed the note for such errors some may still exist.

## 2019-01-03 LAB
ALBUMIN SERPL-MCNC: 4.6 G/DL (ref 3.5–5.2)
ALBUMIN/GLOB SERPL: 1.7 G/DL
ALP SERPL-CCNC: 57 U/L (ref 39–117)
ALT SERPL-CCNC: 21 U/L (ref 1–33)
APPEARANCE UR: CLEAR
AST SERPL-CCNC: 23 U/L (ref 1–32)
BACTERIA #/AREA URNS HPF: NORMAL /HPF
BASOPHILS # BLD AUTO: 0.04 10*3/MM3 (ref 0–0.2)
BASOPHILS NFR BLD AUTO: 0.6 % (ref 0–1.5)
BILIRUB SERPL-MCNC: 0.5 MG/DL (ref 0.1–1.2)
BILIRUB UR QL STRIP: NEGATIVE
BUN SERPL-MCNC: 15 MG/DL (ref 6–20)
BUN/CREAT SERPL: 15.5 (ref 7–25)
CALCIUM SERPL-MCNC: 9.8 MG/DL (ref 8.6–10.5)
CHLORIDE SERPL-SCNC: 104 MMOL/L (ref 98–107)
CHOLEST SERPL-MCNC: 159 MG/DL (ref 0–200)
CHOLEST/HDLC SERPL: 2.65 {RATIO}
CO2 SERPL-SCNC: 28.7 MMOL/L (ref 22–29)
COLOR UR: YELLOW
CREAT SERPL-MCNC: 0.97 MG/DL (ref 0.57–1)
EOSINOPHIL # BLD AUTO: 0.11 10*3/MM3 (ref 0–0.7)
EOSINOPHIL NFR BLD AUTO: 1.7 % (ref 0.3–6.2)
EPI CELLS #/AREA URNS HPF: NORMAL /HPF
ERYTHROCYTE [DISTWIDTH] IN BLOOD BY AUTOMATED COUNT: 12.7 % (ref 11.7–13)
GLOBULIN SER CALC-MCNC: 2.7 GM/DL
GLUCOSE SERPL-MCNC: 89 MG/DL (ref 65–99)
GLUCOSE UR QL: NEGATIVE
HCT VFR BLD AUTO: 43 % (ref 35.6–45.5)
HDLC SERPL-MCNC: 60 MG/DL (ref 40–60)
HGB BLD-MCNC: 14.4 G/DL (ref 11.9–15.5)
HGB UR QL STRIP: ABNORMAL
IMM GRANULOCYTES # BLD: 0.02 10*3/MM3 (ref 0–0.03)
IMM GRANULOCYTES NFR BLD: 0.3 % (ref 0–0.5)
KETONES UR QL STRIP: NEGATIVE
LDLC SERPL CALC-MCNC: 89 MG/DL (ref 0–100)
LEUKOCYTE ESTERASE UR QL STRIP: NEGATIVE
LYMPHOCYTES # BLD AUTO: 2.09 10*3/MM3 (ref 0.9–4.8)
LYMPHOCYTES NFR BLD AUTO: 32.6 % (ref 19.6–45.3)
MCH RBC QN AUTO: 30.5 PG (ref 26.9–32)
MCHC RBC AUTO-ENTMCNC: 33.5 G/DL (ref 32.4–36.3)
MCV RBC AUTO: 91.1 FL (ref 80.5–98.2)
MICRO URNS: ABNORMAL
MONOCYTES # BLD AUTO: 0.58 10*3/MM3 (ref 0.2–1.2)
MONOCYTES NFR BLD AUTO: 9 % (ref 5–12)
NEUTROPHILS # BLD AUTO: 3.59 10*3/MM3 (ref 1.9–8.1)
NEUTROPHILS NFR BLD AUTO: 56.1 % (ref 42.7–76)
NITRITE UR QL STRIP: NEGATIVE
PH UR STRIP: 7 [PH] (ref 5–7.5)
PLATELET # BLD AUTO: 223 10*3/MM3 (ref 140–500)
POTASSIUM SERPL-SCNC: 4.4 MMOL/L (ref 3.5–5.2)
PROT SERPL-MCNC: 7.3 G/DL (ref 6–8.5)
PROT UR QL STRIP: NEGATIVE
RBC # BLD AUTO: 4.72 10*6/MM3 (ref 3.9–5.2)
RBC #/AREA URNS HPF: NORMAL /HPF
SODIUM SERPL-SCNC: 142 MMOL/L (ref 136–145)
SP GR UR: 1.01 (ref 1–1.03)
T4 FREE SERPL-MCNC: 1.15 NG/DL (ref 0.93–1.7)
TRIGL SERPL-MCNC: 51 MG/DL (ref 0–150)
TSH SERPL DL<=0.005 MIU/L-ACNC: 3.39 MIU/ML (ref 0.27–4.2)
URINALYSIS REFLEX: ABNORMAL
UROBILINOGEN UR STRIP-MCNC: 0.2 MG/DL (ref 0.2–1)
VLDLC SERPL CALC-MCNC: 10.2 MG/DL (ref 5–40)
WBC # BLD AUTO: 6.41 10*3/MM3 (ref 4.5–10.7)
WBC #/AREA URNS HPF: NORMAL /HPF

## 2019-01-09 ENCOUNTER — TELEPHONE (OUTPATIENT)
Dept: OBSTETRICS AND GYNECOLOGY | Age: 29
End: 2019-01-09

## 2019-01-09 ENCOUNTER — PROCEDURE VISIT (OUTPATIENT)
Dept: OBSTETRICS AND GYNECOLOGY | Age: 29
End: 2019-01-09

## 2019-01-09 ENCOUNTER — OFFICE VISIT (OUTPATIENT)
Dept: OBSTETRICS AND GYNECOLOGY | Age: 29
End: 2019-01-09

## 2019-01-09 VITALS
SYSTOLIC BLOOD PRESSURE: 120 MMHG | WEIGHT: 134 LBS | HEIGHT: 66 IN | BODY MASS INDEX: 21.53 KG/M2 | DIASTOLIC BLOOD PRESSURE: 74 MMHG

## 2019-01-09 DIAGNOSIS — R10.2 PELVIC PAIN: Primary | ICD-10-CM

## 2019-01-09 DIAGNOSIS — N89.8 VAGINAL DISCHARGE: ICD-10-CM

## 2019-01-09 LAB
B-HCG UR QL: NEGATIVE
BILIRUB BLD-MCNC: NEGATIVE MG/DL
GLUCOSE UR STRIP-MCNC: NEGATIVE MG/DL
INTERNAL NEGATIVE CONTROL: NEGATIVE
INTERNAL POSITIVE CONTROL: POSITIVE
KETONES UR QL: NEGATIVE
LEUKOCYTE EST, POC: NEGATIVE
Lab: NORMAL
NITRITE UR-MCNC: NEGATIVE MG/ML
PH UR: 7 [PH] (ref 5–8)
PROT UR STRIP-MCNC: NEGATIVE MG/DL
RBC # UR STRIP: NEGATIVE /UL
SP GR UR: 1.02 (ref 1–1.03)
UROBILINOGEN UR QL: NORMAL

## 2019-01-09 PROCEDURE — 81002 URINALYSIS NONAUTO W/O SCOPE: CPT | Performed by: OBSTETRICS & GYNECOLOGY

## 2019-01-09 PROCEDURE — 99213 OFFICE O/P EST LOW 20 MIN: CPT | Performed by: OBSTETRICS & GYNECOLOGY

## 2019-01-09 PROCEDURE — 81025 URINE PREGNANCY TEST: CPT | Performed by: OBSTETRICS & GYNECOLOGY

## 2019-01-09 PROCEDURE — 76830 TRANSVAGINAL US NON-OB: CPT | Performed by: OBSTETRICS & GYNECOLOGY

## 2019-01-09 NOTE — TELEPHONE ENCOUNTER
Called pt back. She has low abdominal discomfort and some cramping and pain. She denies any chance of pregnancy and reports she is not sexually active. Her LMP was about 2 weeks prior. She was seen by her primary md. Her UA was negative but he did give her an antibiotic. She tried macrobid but she did not feel any improvement in her symptoms. She notes discomfort now is primarily on her right side. Recommend she come in for evaluation today and will try to work in u/s as well.

## 2019-01-09 NOTE — PROGRESS NOTES
"Chief complaint:pelvic pain and discharge with odor    HPI  Bogdan Garcia is a 28 y.o. female presents with low abdominal discomfort for last week. She was seen by her primary and evaluated for UTI. UA only showed trace blood but she started macrobid and noted no improvement in symptoms. She notes discomfort is now more to right and she now has discharge with odor. She has regular menses and reports she has not been sexually active for many months.         The following portions of the patient's history were reviewed and updated as appropriate: allergies, current medications, past family history, past medical history, past social history, past surgical history and problem list.    Review of Systems  Constitutional: negative, negative for fever/chills  Gastrointestinal: negative for n/v  Genitourinary:positive for spotting and pain in right pelvis    /74   Ht 167.6 cm (66\")   Wt 60.8 kg (134 lb)   LMP 12/19/2018 (Within Days)   BMI 21.63 kg/m²         Physical Exam   Constitutional: She appears well-developed and well-nourished.   HENT:   Head: Normocephalic and atraumatic.   Cardiovascular: Normal rate and regular rhythm.   Pulmonary/Chest: Effort normal and breath sounds normal.   Abdominal: Soft. Bowel sounds are normal. She exhibits no distension. There is no tenderness. There is no rebound and no guarding.   Psychiatric: She has a normal mood and affect. Her behavior is normal.   : normal external female genitalia, normal vagina with small amount of gray discharge, no blood, cervix without lesions, no CMT, no uterine tenderness, normal uterus and adnexa without masses or tenderness  Discharge pH: over 5.5, clue cells noted on wet prep    tv u/s: normal uterus, normal left ovary, right ovary normal but contains 2 simple follicular cysts, 2.1 X 1.7 cm and 1.7 X 1.5 cm, no free fluid noted        Bogdan was seen today for follow-up.    Diagnoses and all orders for this visit:    Pelvic pain  -     POC " Urinalysis Dipstick  -     POC Pregnancy, Urine  -     Cancel: NuSwab VG+ & HSV  -     NuSwab VG+ - Swab, Vagina    Vaginal discharge  -     Cancel: NuSwab VG+ & HSV  -     NuSwab VG+ - Swab, Vagina    Other orders  -     Clindamycin Phosphate, 1 Dose, (CLINDESSE) 2 % vaginal cream; Apply 1 applicator topically to the appropriate area as directed 1 (One) Time for 1 dose.        Suspect discomfort related to ovulation given timing in cycle and presence of small follicular cysts on right ovary. Reviewed warnings for appendicitis but pt without abdominal tenderness or change in appetite. She was advised to go to ER with worsening discomfort. Suspect BV and pt requests treatment with Clindesse; this worked well for her before and she did not tolerate flagyl.     Urine hcg done and negative

## 2019-01-14 ENCOUNTER — TELEPHONE (OUTPATIENT)
Dept: OBSTETRICS AND GYNECOLOGY | Age: 29
End: 2019-01-14

## 2019-01-14 NOTE — TELEPHONE ENCOUNTER
----- Message from Violet Xiao MD sent at 1/13/2019  9:38 AM EST -----  Please call Bogdan, her vaginal test was negative for BV, yeast or CZ/GC or trich

## 2019-06-13 RX ORDER — FLUDROCORTISONE ACETATE 0.1 MG/1
TABLET ORAL
Qty: 180 TABLET | Refills: 0 | OUTPATIENT
Start: 2019-06-13

## 2019-06-14 ENCOUNTER — DOCUMENTATION (OUTPATIENT)
Dept: SPORTS MEDICINE | Facility: CLINIC | Age: 29
End: 2019-06-14

## 2019-06-14 DIAGNOSIS — F41.0 PANIC DISORDER: ICD-10-CM

## 2019-06-14 DIAGNOSIS — G43.009 ATYPICAL MIGRAINE: ICD-10-CM

## 2019-06-14 DIAGNOSIS — N30.00 ACUTE CYSTITIS WITHOUT HEMATURIA: ICD-10-CM

## 2019-06-14 DIAGNOSIS — G90.9 DISORDER OF AUTONOMIC NERVOUS SYSTEM: ICD-10-CM

## 2019-06-14 RX ORDER — FLUDROCORTISONE ACETATE 0.1 MG/1
0.15 TABLET ORAL DAILY
Qty: 135 TABLET | Refills: 3
Start: 2019-06-14 | End: 2019-06-18 | Stop reason: SDUPTHER

## 2019-06-17 RX ORDER — FLUDROCORTISONE ACETATE 0.1 MG/1
TABLET ORAL
Qty: 180 TABLET | Refills: 0 | OUTPATIENT
Start: 2019-06-17

## 2019-06-18 ENCOUNTER — TELEPHONE (OUTPATIENT)
Dept: SPORTS MEDICINE | Facility: CLINIC | Age: 29
End: 2019-06-18

## 2019-06-18 DIAGNOSIS — G90.9 DISORDER OF AUTONOMIC NERVOUS SYSTEM: ICD-10-CM

## 2019-06-18 DIAGNOSIS — G43.009 ATYPICAL MIGRAINE: ICD-10-CM

## 2019-06-18 DIAGNOSIS — N30.00 ACUTE CYSTITIS WITHOUT HEMATURIA: ICD-10-CM

## 2019-06-18 DIAGNOSIS — F41.0 PANIC DISORDER: ICD-10-CM

## 2019-06-18 RX ORDER — FLUDROCORTISONE ACETATE 0.1 MG/1
0.15 TABLET ORAL DAILY
Qty: 45 TABLET | Refills: 0 | Status: SHIPPED | OUTPATIENT
Start: 2019-06-18 | End: 2019-06-18 | Stop reason: SDUPTHER

## 2019-06-18 NOTE — TELEPHONE ENCOUNTER
Patient would like to get the fludrocortisone medication filled. She states that she has one pill left and she gets severe migraines when she does not take this medication. Please advise, I informed her that Dr. Scott was gone this week and that the only provider here might not see this message until tomorrow. Patient verbalized understanding.

## 2019-06-19 RX ORDER — FLUDROCORTISONE ACETATE 0.1 MG/1
TABLET ORAL
Qty: 135 TABLET | Refills: 0 | Status: SHIPPED | OUTPATIENT
Start: 2019-06-19 | End: 2019-10-14 | Stop reason: SDUPTHER

## 2019-10-14 DIAGNOSIS — G90.9 DISORDER OF AUTONOMIC NERVOUS SYSTEM: ICD-10-CM

## 2019-10-14 DIAGNOSIS — F41.0 PANIC DISORDER: ICD-10-CM

## 2019-10-14 DIAGNOSIS — G43.009 ATYPICAL MIGRAINE: ICD-10-CM

## 2019-10-14 DIAGNOSIS — N30.00 ACUTE CYSTITIS WITHOUT HEMATURIA: ICD-10-CM

## 2019-10-16 RX ORDER — FLUDROCORTISONE ACETATE 0.1 MG/1
TABLET ORAL
Qty: 135 TABLET | Refills: 0 | Status: SHIPPED | OUTPATIENT
Start: 2019-10-16 | End: 2020-01-28

## 2019-11-12 ENCOUNTER — APPOINTMENT (OUTPATIENT)
Dept: CT IMAGING | Facility: HOSPITAL | Age: 29
End: 2019-11-12

## 2019-11-12 ENCOUNTER — ANESTHESIA EVENT (OUTPATIENT)
Dept: PERIOP | Facility: HOSPITAL | Age: 29
End: 2019-11-12

## 2019-11-12 ENCOUNTER — ANESTHESIA (OUTPATIENT)
Dept: PERIOP | Facility: HOSPITAL | Age: 29
End: 2019-11-12

## 2019-11-12 ENCOUNTER — HOSPITAL ENCOUNTER (OUTPATIENT)
Facility: HOSPITAL | Age: 29
Discharge: HOME | End: 2019-11-12
Attending: EMERGENCY MEDICINE | Admitting: SURGERY

## 2019-11-12 VITALS
TEMPERATURE: 98 F | DIASTOLIC BLOOD PRESSURE: 74 MMHG | OXYGEN SATURATION: 97 % | HEIGHT: 66 IN | RESPIRATION RATE: 16 BRPM | WEIGHT: 130 LBS | HEART RATE: 86 BPM | SYSTOLIC BLOOD PRESSURE: 118 MMHG | BODY MASS INDEX: 20.89 KG/M2

## 2019-11-12 DIAGNOSIS — K35.80 ACUTE APPENDICITIS: ICD-10-CM

## 2019-11-12 DIAGNOSIS — K35.30 ACUTE APPENDICITIS WITH LOCALIZED PERITONITIS, WITHOUT PERFORATION, ABSCESS, OR GANGRENE: Primary | ICD-10-CM

## 2019-11-12 LAB
ALBUMIN SERPL-MCNC: 4.9 G/DL (ref 3.5–5.2)
ALBUMIN/GLOB SERPL: 1.9 G/DL
ALP SERPL-CCNC: 63 U/L (ref 39–117)
ALT SERPL W P-5'-P-CCNC: 9 U/L (ref 1–33)
ANION GAP SERPL CALCULATED.3IONS-SCNC: 11.8 MMOL/L (ref 5–15)
AST SERPL-CCNC: 18 U/L (ref 1–32)
BILIRUB SERPL-MCNC: 0.8 MG/DL (ref 0.2–1.2)
BILIRUB UR QL STRIP: NEGATIVE
BUN BLD-MCNC: 10 MG/DL (ref 6–20)
BUN/CREAT SERPL: 10.9 (ref 7–25)
CALCIUM SPEC-SCNC: 9.4 MG/DL (ref 8.6–10.5)
CHLORIDE SERPL-SCNC: 98 MMOL/L (ref 98–107)
CLARITY UR: CLEAR
CO2 SERPL-SCNC: 26.2 MMOL/L (ref 22–29)
COLOR UR: YELLOW
CREAT BLD-MCNC: 0.92 MG/DL (ref 0.57–1)
GFR SERPL CREATININE-BSD FRML MDRD: 72 ML/MIN/1.73
GLOBULIN UR ELPH-MCNC: 2.6 GM/DL
GLUCOSE BLD-MCNC: 93 MG/DL (ref 65–99)
GLUCOSE UR STRIP-MCNC: NEGATIVE MG/DL
HCG SERPL QL: NEGATIVE
HGB UR QL STRIP.AUTO: NEGATIVE
KETONES UR QL STRIP: NEGATIVE
LEUKOCYTE ESTERASE UR QL STRIP.AUTO: NEGATIVE
NITRITE UR QL STRIP: NEGATIVE
PH UR STRIP.AUTO: 5.5 [PH] (ref 5–8)
POTASSIUM BLD-SCNC: 4.3 MMOL/L (ref 3.5–5.2)
PROT SERPL-MCNC: 7.5 G/DL (ref 6–8.5)
PROT UR QL STRIP: NEGATIVE
SODIUM BLD-SCNC: 136 MMOL/L (ref 136–145)
SP GR UR STRIP: 1.01 (ref 1–1.03)
UROBILINOGEN UR QL STRIP: NORMAL

## 2019-11-12 PROCEDURE — 25010000002 MIDAZOLAM PER 1 MG: Performed by: ANESTHESIOLOGY

## 2019-11-12 PROCEDURE — 25010000002 IOPAMIDOL 61 % SOLUTION: Performed by: EMERGENCY MEDICINE

## 2019-11-12 PROCEDURE — 44970 LAPAROSCOPY APPENDECTOMY: CPT | Performed by: SPECIALIST/TECHNOLOGIST, OTHER

## 2019-11-12 PROCEDURE — 25010000002 KETOROLAC TROMETHAMINE PER 15 MG: Performed by: NURSE ANESTHETIST, CERTIFIED REGISTERED

## 2019-11-12 PROCEDURE — 25010000002 NEOSTIGMINE PER 0.5 MG: Performed by: NURSE ANESTHETIST, CERTIFIED REGISTERED

## 2019-11-12 PROCEDURE — 25010000002 HYDROMORPHONE PER 4 MG: Performed by: NURSE ANESTHETIST, CERTIFIED REGISTERED

## 2019-11-12 PROCEDURE — 96360 HYDRATION IV INFUSION INIT: CPT

## 2019-11-12 PROCEDURE — 80053 COMPREHEN METABOLIC PANEL: CPT | Performed by: EMERGENCY MEDICINE

## 2019-11-12 PROCEDURE — 81003 URINALYSIS AUTO W/O SCOPE: CPT | Performed by: EMERGENCY MEDICINE

## 2019-11-12 PROCEDURE — 25010000002 ONDANSETRON PER 1 MG: Performed by: NURSE ANESTHETIST, CERTIFIED REGISTERED

## 2019-11-12 PROCEDURE — 25010000002 FENTANYL CITRATE (PF) 100 MCG/2ML SOLUTION: Performed by: NURSE ANESTHETIST, CERTIFIED REGISTERED

## 2019-11-12 PROCEDURE — 25010000003 CEFAZOLIN IN DEXTROSE 2-4 GM/100ML-% SOLUTION: Performed by: SURGERY

## 2019-11-12 PROCEDURE — 94640 AIRWAY INHALATION TREATMENT: CPT

## 2019-11-12 PROCEDURE — 88304 TISSUE EXAM BY PATHOLOGIST: CPT | Performed by: SURGERY

## 2019-11-12 PROCEDURE — 44970 LAPAROSCOPY APPENDECTOMY: CPT | Performed by: SURGERY

## 2019-11-12 PROCEDURE — 84703 CHORIONIC GONADOTROPIN ASSAY: CPT | Performed by: EMERGENCY MEDICINE

## 2019-11-12 PROCEDURE — 99284 EMERGENCY DEPT VISIT MOD MDM: CPT

## 2019-11-12 PROCEDURE — 96361 HYDRATE IV INFUSION ADD-ON: CPT

## 2019-11-12 PROCEDURE — 99243 OFF/OP CNSLTJ NEW/EST LOW 30: CPT | Performed by: SURGERY

## 2019-11-12 PROCEDURE — 25010000002 DEXAMETHASONE PER 1 MG: Performed by: NURSE ANESTHETIST, CERTIFIED REGISTERED

## 2019-11-12 PROCEDURE — 74177 CT ABD & PELVIS W/CONTRAST: CPT

## 2019-11-12 PROCEDURE — 25010000002 PROPOFOL 10 MG/ML EMULSION: Performed by: NURSE ANESTHETIST, CERTIFIED REGISTERED

## 2019-11-12 DEVICE — CLIP LIGAT VASC HORIZON TI MD/LG GRN 6CT: Type: IMPLANTABLE DEVICE | Site: ABDOMEN | Status: FUNCTIONAL

## 2019-11-12 DEVICE — THE ECHELON, ECHELON ENDOPATH™ AND ECHELON FLEX™ FAMILIES OF ENDOSCOPIC LINEAR CUTTERS AND RELOADS ARE STERILE, SINGLE PATIENT USE INSTRUMENTS THAT SIMULTANEOUSLY CUT AND STAPLE TISSUE. THERE ARE SIX STAGGERED ROWS OF STAPLES, THREE ON EITHER SIDE OF THE CUT LINE. THE 45 MM INSTRUMENTS HAVE A STAPLE LINE THATIS APPROXIMATELY 45 MM LONG AND A CUT LINE THAT IS APPROXIMATELY 42 MM LONG. THE SHAFT CAN ROTATE FREELY IN BOTH DIRECTIONS AND AN ARTICULATION MECHANISM ON ARTICULATING INSTRUMENTS ENABLES BENDING THE DISTAL PORTIONOF THE SHAFT TO FACILITATE LATERAL ACCESS OF THE OPERATIVE SITE.THE INSTRUMENTS ARE SHIPPED WITHOUT A RELOAD AND MUST BE LOADED PRIOR TO USE. A STAPLE RETAINING CAP ON THE RELOAD PROTECTS THE STAPLE LEG POINTS DURING SHIPPING AND TRANSPORTATION. THE INSTRUMENTS’ LOCK-OUT FEATURE IS DESIGNED TO PREVENT A USED RELOAD FROM BEING REFIRED.
Type: IMPLANTABLE DEVICE | Site: ABDOMEN | Status: FUNCTIONAL
Brand: ECHELON ENDOPATH

## 2019-11-12 DEVICE — ENDOSCOPIC LINEAR CUTTER RELOADS GRAY 2.0 MM
Type: IMPLANTABLE DEVICE | Site: ABDOMEN | Status: FUNCTIONAL
Brand: ECHELON; ENDOPATH

## 2019-11-12 RX ORDER — LIDOCAINE HYDROCHLORIDE 10 MG/ML
0.5 INJECTION, SOLUTION EPIDURAL; INFILTRATION; INTRACAUDAL; PERINEURAL ONCE AS NEEDED
Status: DISCONTINUED | OUTPATIENT
Start: 2019-11-12 | End: 2019-11-12 | Stop reason: HOSPADM

## 2019-11-12 RX ORDER — KETOROLAC TROMETHAMINE 30 MG/ML
INJECTION, SOLUTION INTRAMUSCULAR; INTRAVENOUS AS NEEDED
Status: DISCONTINUED | OUTPATIENT
Start: 2019-11-12 | End: 2019-11-12 | Stop reason: SURG

## 2019-11-12 RX ORDER — PROMETHAZINE HYDROCHLORIDE 25 MG/1
25 TABLET ORAL ONCE AS NEEDED
Status: DISCONTINUED | OUTPATIENT
Start: 2019-11-12 | End: 2019-11-12 | Stop reason: HOSPADM

## 2019-11-12 RX ORDER — ONDANSETRON 4 MG/1
4 TABLET, FILM COATED ORAL EVERY 6 HOURS PRN
Qty: 10 TABLET | Refills: 1 | Status: SHIPPED | OUTPATIENT
Start: 2019-11-12 | End: 2019-11-21

## 2019-11-12 RX ORDER — HYDROCODONE BITARTRATE AND ACETAMINOPHEN 5; 325 MG/1; MG/1
TABLET ORAL
Qty: 18 TABLET | Refills: 0 | Status: SHIPPED | OUTPATIENT
Start: 2019-11-12 | End: 2020-06-26

## 2019-11-12 RX ORDER — SODIUM CHLORIDE 0.9 % (FLUSH) 0.9 %
3-10 SYRINGE (ML) INJECTION AS NEEDED
Status: DISCONTINUED | OUTPATIENT
Start: 2019-11-12 | End: 2019-11-12 | Stop reason: HOSPADM

## 2019-11-12 RX ORDER — SODIUM CHLORIDE 0.9 % (FLUSH) 0.9 %
3 SYRINGE (ML) INJECTION EVERY 12 HOURS SCHEDULED
Status: DISCONTINUED | OUTPATIENT
Start: 2019-11-12 | End: 2019-11-12 | Stop reason: HOSPADM

## 2019-11-12 RX ORDER — NALOXONE HCL 0.4 MG/ML
0.2 VIAL (ML) INJECTION AS NEEDED
Status: DISCONTINUED | OUTPATIENT
Start: 2019-11-12 | End: 2019-11-12 | Stop reason: HOSPADM

## 2019-11-12 RX ORDER — MIDAZOLAM HYDROCHLORIDE 1 MG/ML
2 INJECTION INTRAMUSCULAR; INTRAVENOUS
Status: DISCONTINUED | OUTPATIENT
Start: 2019-11-12 | End: 2019-11-12 | Stop reason: HOSPADM

## 2019-11-12 RX ORDER — SODIUM CHLORIDE, SODIUM LACTATE, POTASSIUM CHLORIDE, CALCIUM CHLORIDE 600; 310; 30; 20 MG/100ML; MG/100ML; MG/100ML; MG/100ML
9 INJECTION, SOLUTION INTRAVENOUS CONTINUOUS
Status: DISCONTINUED | OUTPATIENT
Start: 2019-11-12 | End: 2019-11-12 | Stop reason: HOSPADM

## 2019-11-12 RX ORDER — MAGNESIUM HYDROXIDE 1200 MG/15ML
LIQUID ORAL AS NEEDED
Status: DISCONTINUED | OUTPATIENT
Start: 2019-11-12 | End: 2019-11-12 | Stop reason: HOSPADM

## 2019-11-12 RX ORDER — HYDRALAZINE HYDROCHLORIDE 20 MG/ML
5 INJECTION INTRAMUSCULAR; INTRAVENOUS
Status: DISCONTINUED | OUTPATIENT
Start: 2019-11-12 | End: 2019-11-12 | Stop reason: HOSPADM

## 2019-11-12 RX ORDER — EPHEDRINE SULFATE 50 MG/ML
5 INJECTION, SOLUTION INTRAVENOUS ONCE AS NEEDED
Status: DISCONTINUED | OUTPATIENT
Start: 2019-11-12 | End: 2019-11-12 | Stop reason: HOSPADM

## 2019-11-12 RX ORDER — GLYCOPYRROLATE 0.2 MG/ML
INJECTION INTRAMUSCULAR; INTRAVENOUS AS NEEDED
Status: DISCONTINUED | OUTPATIENT
Start: 2019-11-12 | End: 2019-11-12 | Stop reason: SURG

## 2019-11-12 RX ORDER — ONDANSETRON 2 MG/ML
4 INJECTION INTRAMUSCULAR; INTRAVENOUS ONCE AS NEEDED
Status: DISCONTINUED | OUTPATIENT
Start: 2019-11-12 | End: 2019-11-12 | Stop reason: HOSPADM

## 2019-11-12 RX ORDER — SODIUM CHLORIDE 9 MG/ML
INJECTION, SOLUTION INTRAVENOUS AS NEEDED
Status: DISCONTINUED | OUTPATIENT
Start: 2019-11-12 | End: 2019-11-12 | Stop reason: HOSPADM

## 2019-11-12 RX ORDER — FENTANYL CITRATE 50 UG/ML
50 INJECTION, SOLUTION INTRAMUSCULAR; INTRAVENOUS
Status: DISCONTINUED | OUTPATIENT
Start: 2019-11-12 | End: 2019-11-12 | Stop reason: HOSPADM

## 2019-11-12 RX ORDER — HYDROMORPHONE HYDROCHLORIDE 1 MG/ML
0.5 INJECTION, SOLUTION INTRAMUSCULAR; INTRAVENOUS; SUBCUTANEOUS
Status: DISCONTINUED | OUTPATIENT
Start: 2019-11-12 | End: 2019-11-12 | Stop reason: HOSPADM

## 2019-11-12 RX ORDER — SODIUM CHLORIDE 0.9 % (FLUSH) 0.9 %
10 SYRINGE (ML) INJECTION AS NEEDED
Status: DISCONTINUED | OUTPATIENT
Start: 2019-11-12 | End: 2019-11-12 | Stop reason: HOSPADM

## 2019-11-12 RX ORDER — FAMOTIDINE 10 MG/ML
20 INJECTION, SOLUTION INTRAVENOUS ONCE
Status: COMPLETED | OUTPATIENT
Start: 2019-11-12 | End: 2019-11-12

## 2019-11-12 RX ORDER — HYDROCODONE BITARTRATE AND ACETAMINOPHEN 7.5; 325 MG/1; MG/1
1 TABLET ORAL ONCE AS NEEDED
Status: COMPLETED | OUTPATIENT
Start: 2019-11-12 | End: 2019-11-12

## 2019-11-12 RX ORDER — PROMETHAZINE HYDROCHLORIDE 25 MG/ML
12.5 INJECTION, SOLUTION INTRAMUSCULAR; INTRAVENOUS ONCE AS NEEDED
Status: DISCONTINUED | OUTPATIENT
Start: 2019-11-12 | End: 2019-11-12 | Stop reason: HOSPADM

## 2019-11-12 RX ORDER — FENTANYL CITRATE 50 UG/ML
INJECTION, SOLUTION INTRAMUSCULAR; INTRAVENOUS AS NEEDED
Status: DISCONTINUED | OUTPATIENT
Start: 2019-11-12 | End: 2019-11-12 | Stop reason: SURG

## 2019-11-12 RX ORDER — HYDROMORPHONE HCL 110MG/55ML
PATIENT CONTROLLED ANALGESIA SYRINGE INTRAVENOUS AS NEEDED
Status: DISCONTINUED | OUTPATIENT
Start: 2019-11-12 | End: 2019-11-12 | Stop reason: SURG

## 2019-11-12 RX ORDER — PROMETHAZINE HYDROCHLORIDE 25 MG/ML
6.25 INJECTION, SOLUTION INTRAMUSCULAR; INTRAVENOUS
Status: DISCONTINUED | OUTPATIENT
Start: 2019-11-12 | End: 2019-11-12 | Stop reason: HOSPADM

## 2019-11-12 RX ORDER — PROMETHAZINE HYDROCHLORIDE 25 MG/1
25 SUPPOSITORY RECTAL ONCE AS NEEDED
Status: DISCONTINUED | OUTPATIENT
Start: 2019-11-12 | End: 2019-11-12 | Stop reason: HOSPADM

## 2019-11-12 RX ORDER — LABETALOL HYDROCHLORIDE 5 MG/ML
5 INJECTION, SOLUTION INTRAVENOUS
Status: DISCONTINUED | OUTPATIENT
Start: 2019-11-12 | End: 2019-11-12 | Stop reason: HOSPADM

## 2019-11-12 RX ORDER — LIDOCAINE HYDROCHLORIDE 20 MG/ML
INJECTION, SOLUTION INFILTRATION; PERINEURAL AS NEEDED
Status: DISCONTINUED | OUTPATIENT
Start: 2019-11-12 | End: 2019-11-12 | Stop reason: SURG

## 2019-11-12 RX ORDER — MIDAZOLAM HYDROCHLORIDE 1 MG/ML
1 INJECTION INTRAMUSCULAR; INTRAVENOUS
Status: DISCONTINUED | OUTPATIENT
Start: 2019-11-12 | End: 2019-11-12 | Stop reason: HOSPADM

## 2019-11-12 RX ORDER — ONDANSETRON 2 MG/ML
INJECTION INTRAMUSCULAR; INTRAVENOUS AS NEEDED
Status: DISCONTINUED | OUTPATIENT
Start: 2019-11-12 | End: 2019-11-12 | Stop reason: SURG

## 2019-11-12 RX ORDER — DIPHENHYDRAMINE HCL 25 MG
25 CAPSULE ORAL
Status: DISCONTINUED | OUTPATIENT
Start: 2019-11-12 | End: 2019-11-12 | Stop reason: HOSPADM

## 2019-11-12 RX ORDER — PROPOFOL 10 MG/ML
VIAL (ML) INTRAVENOUS AS NEEDED
Status: DISCONTINUED | OUTPATIENT
Start: 2019-11-12 | End: 2019-11-12 | Stop reason: SURG

## 2019-11-12 RX ORDER — DEXAMETHASONE SODIUM PHOSPHATE 10 MG/ML
INJECTION INTRAMUSCULAR; INTRAVENOUS AS NEEDED
Status: DISCONTINUED | OUTPATIENT
Start: 2019-11-12 | End: 2019-11-12 | Stop reason: SURG

## 2019-11-12 RX ORDER — CEFAZOLIN SODIUM 2 G/100ML
2 INJECTION, SOLUTION INTRAVENOUS ONCE
Status: COMPLETED | OUTPATIENT
Start: 2019-11-12 | End: 2019-11-12

## 2019-11-12 RX ORDER — OXYCODONE AND ACETAMINOPHEN 7.5; 325 MG/1; MG/1
1 TABLET ORAL ONCE AS NEEDED
Status: DISCONTINUED | OUTPATIENT
Start: 2019-11-12 | End: 2019-11-12 | Stop reason: HOSPADM

## 2019-11-12 RX ORDER — ROCURONIUM BROMIDE 10 MG/ML
INJECTION, SOLUTION INTRAVENOUS AS NEEDED
Status: DISCONTINUED | OUTPATIENT
Start: 2019-11-12 | End: 2019-11-12 | Stop reason: SURG

## 2019-11-12 RX ORDER — BUPIVACAINE HYDROCHLORIDE AND EPINEPHRINE 5; 5 MG/ML; UG/ML
INJECTION, SOLUTION PERINEURAL AS NEEDED
Status: DISCONTINUED | OUTPATIENT
Start: 2019-11-12 | End: 2019-11-12 | Stop reason: HOSPADM

## 2019-11-12 RX ORDER — DIPHENHYDRAMINE HYDROCHLORIDE 50 MG/ML
12.5 INJECTION INTRAMUSCULAR; INTRAVENOUS
Status: DISCONTINUED | OUTPATIENT
Start: 2019-11-12 | End: 2019-11-12 | Stop reason: HOSPADM

## 2019-11-12 RX ORDER — IPRATROPIUM BROMIDE AND ALBUTEROL SULFATE 2.5; .5 MG/3ML; MG/3ML
3 SOLUTION RESPIRATORY (INHALATION) ONCE
Status: COMPLETED | OUTPATIENT
Start: 2019-11-12 | End: 2019-11-12

## 2019-11-12 RX ORDER — FLUMAZENIL 0.1 MG/ML
0.2 INJECTION INTRAVENOUS AS NEEDED
Status: DISCONTINUED | OUTPATIENT
Start: 2019-11-12 | End: 2019-11-12 | Stop reason: HOSPADM

## 2019-11-12 RX ORDER — ACETAMINOPHEN 325 MG/1
650 TABLET ORAL ONCE AS NEEDED
Status: DISCONTINUED | OUTPATIENT
Start: 2019-11-12 | End: 2019-11-12 | Stop reason: HOSPADM

## 2019-11-12 RX ADMIN — IOPAMIDOL 85 ML: 612 INJECTION, SOLUTION INTRAVENOUS at 12:08

## 2019-11-12 RX ADMIN — NEOSTIGMINE METHYLSULFATE 2 MG: 1 INJECTION INTRAMUSCULAR; INTRAVENOUS; SUBCUTANEOUS at 14:26

## 2019-11-12 RX ADMIN — IPRATROPIUM BROMIDE AND ALBUTEROL SULFATE 3 ML: 2.5; .5 SOLUTION RESPIRATORY (INHALATION) at 15:19

## 2019-11-12 RX ADMIN — PROPOFOL 150 MG: 10 INJECTION, EMULSION INTRAVENOUS at 13:57

## 2019-11-12 RX ADMIN — SODIUM CHLORIDE, POTASSIUM CHLORIDE, SODIUM LACTATE AND CALCIUM CHLORIDE 9 ML/HR: 600; 310; 30; 20 INJECTION, SOLUTION INTRAVENOUS at 13:48

## 2019-11-12 RX ADMIN — HYDROMORPHONE HYDROCHLORIDE 0.5 MG: 1 INJECTION, SOLUTION INTRAMUSCULAR; INTRAVENOUS; SUBCUTANEOUS at 15:32

## 2019-11-12 RX ADMIN — GLYCOPYRROLATE 0.4 MG: 0.2 INJECTION INTRAMUSCULAR; INTRAVENOUS at 14:26

## 2019-11-12 RX ADMIN — ROCURONIUM BROMIDE 30 MG: 10 INJECTION INTRAVENOUS at 13:57

## 2019-11-12 RX ADMIN — CEFAZOLIN SODIUM 2 G: 2 INJECTION, SOLUTION INTRAVENOUS at 13:51

## 2019-11-12 RX ADMIN — MIDAZOLAM 1 MG: 1 INJECTION INTRAMUSCULAR; INTRAVENOUS at 13:44

## 2019-11-12 RX ADMIN — SODIUM CHLORIDE 1000 ML: 9 INJECTION, SOLUTION INTRAVENOUS at 11:13

## 2019-11-12 RX ADMIN — ONDANSETRON 4 MG: 2 INJECTION INTRAMUSCULAR; INTRAVENOUS at 14:26

## 2019-11-12 RX ADMIN — LIDOCAINE HYDROCHLORIDE 60 MG: 20 INJECTION, SOLUTION INFILTRATION; PERINEURAL at 13:57

## 2019-11-12 RX ADMIN — FENTANYL CITRATE 50 MCG: 50 INJECTION, SOLUTION INTRAMUSCULAR; INTRAVENOUS at 15:12

## 2019-11-12 RX ADMIN — HYDROMORPHONE HYDROCHLORIDE 0.5 MG: 2 INJECTION INTRAMUSCULAR; INTRAVENOUS; SUBCUTANEOUS at 14:28

## 2019-11-12 RX ADMIN — HYDROMORPHONE HYDROCHLORIDE 0.5 MG: 2 INJECTION INTRAMUSCULAR; INTRAVENOUS; SUBCUTANEOUS at 14:35

## 2019-11-12 RX ADMIN — DEXAMETHASONE SODIUM PHOSPHATE 8 MG: 10 INJECTION INTRAMUSCULAR; INTRAVENOUS at 14:04

## 2019-11-12 RX ADMIN — KETOROLAC TROMETHAMINE 30 MG: 30 INJECTION, SOLUTION INTRAMUSCULAR; INTRAVENOUS at 14:26

## 2019-11-12 RX ADMIN — HYDROCODONE BITARTRATE AND ACETAMINOPHEN 1 TABLET: 7.5; 325 TABLET ORAL at 15:42

## 2019-11-12 RX ADMIN — FENTANYL CITRATE 100 MCG: 50 INJECTION INTRAMUSCULAR; INTRAVENOUS at 13:53

## 2019-11-12 RX ADMIN — HYDROMORPHONE HYDROCHLORIDE 0.5 MG: 1 INJECTION, SOLUTION INTRAMUSCULAR; INTRAVENOUS; SUBCUTANEOUS at 15:41

## 2019-11-12 RX ADMIN — FAMOTIDINE 20 MG: 10 INJECTION INTRAVENOUS at 13:44

## 2019-11-12 RX ADMIN — FENTANYL CITRATE 50 MCG: 50 INJECTION, SOLUTION INTRAMUSCULAR; INTRAVENOUS at 15:22

## 2019-11-12 NOTE — OP NOTE
PREOPERATIVE DIAGNOSIS:  Acute appendicitis    POSTOPERATIVE DIAGNOSIS (FINDINGS):  Same     PROCEDURE:  Laparoscopic appendectomy    SURGEON:  Lorenzo Pena MD    ASSISTANT:  Azul Pérez    ANESTHESIA:  General    EBL:  Minimal    SPECIMEN(S):  Appendix    DESCRIPTION:  In supine position under general anesthetic prepped and draped usual sterile manner.  Half percent Marcaine with epinephrine infiltrated locally.  Small incision made below and just to the left of the umbilicus and Veress needle inserted with upwards traction on the abdominal wall.  Abdomen insufflated to 15 mmHg and a 12 mm Optiview trocar inserted followed by suprapubic 5 and right lower quadrant 5 mm trocar.  Appendix was retrocecal and acutely inflamed without evidence of perforation.  The base of the appendix was dissected and transected including several millimeters of cecum with the [place] white loaded stapler.  The appendiceal mesentery was then divided with the vascular loaded stapler and the appendix was placed in an Endo Catch bag and removed through the umbilical trocar site.  2 clips were applied to the appendiceal mesenteric staple line and good hemostasis was ensured.  CO2 was released.  Fascia of the umbilical trocar site was closed in 2 layers of 0 Vicryl.  Skin edges were closed with 5-0 Vicryl subcuticular followed by Dermabond.  Tolerated well, stable to recovery room.    Lorenzo Pena M.D.

## 2019-11-12 NOTE — ANESTHESIA POSTPROCEDURE EVALUATION
Patient: Bogdan Garcia    Procedure Summary     Date:  11/12/19 Room / Location:  [facility] OR  / [facility] MAIN OR    Anesthesia Start:  1351 Anesthesia Stop:  1440    Procedure:  APPENDECTOMY LAPAROSCOPIC (N/A Abdomen) Diagnosis:      Surgeon:  Lorenzo Pena MD Provider:  Rachael Rob MD    Anesthesia Type:  general ASA Status:  2          Anesthesia Type: general  Last vitals  BP   129/89 (11/12/19 1530)   Temp   36.8 °C (98.2 °F) (11/12/19 1436)   Pulse   89 (11/12/19 1530)   Resp   16 (11/12/19 1530)     SpO2   100 % (11/12/19 1530)     Post Anesthesia Care and Evaluation    Patient location during evaluation: PACU  Patient participation: complete - patient participated  Level of consciousness: awake and alert  Pain management: adequate  Airway patency: patent  Anesthetic complications: No anesthetic complications    Cardiovascular status: acceptable  Respiratory status: acceptable  Hydration status: acceptable    Comments: --------------------            11/12/19               1530     --------------------   BP:       129/89     Pulse:      89       Resp:       16       Temp:                SpO2:      100%     --------------------

## 2019-11-12 NOTE — H&P
SUMMARY (A/P):    29-year-old with signs, symptoms, and confirmatory CT scan for acute appendicitis.  Recommended proceeding with laparoscopic appendectomy.  She understands the rationale for the procedure, the nature of the procedure, and the risks and wishes to proceed as outlined.      CC:    Abdominal pain, referred for consultation by Dr. Leahy from [facility]    HPI:    29-year-old presents with less than 24-hour history of moderate progressing to severe right lower quadrant abdominal pain.  No associated nausea, vomiting, or fever.    PSH:    None    PMH:    Migraines    FAMILY HISTORY:    Reviewed and noncontributory to current presentation    SOCIAL HISTORY:   Denies tobacco use  Denies alcohol use    ALLERGIES:  Monistat-itching    MEDICATIONS: reviewed, in Epic.  On fludrocortisone, Zoloft, Allegra, Flonase    ROS:  No chest pain or shortness of air.  All other systems reviewed and negative other than presenting complaints.    RADIOLOGY/ENDOSCOPY:    CT abdomen pelvis: On my review of images demonstrates retrocecal appendicitis.  Radiology report: Suggestive of acute appendicitis.    LABS:    hCG negative  CMP normal  Urinalysis negative  WBC 17, hemoglobin 14.9    PHYSICAL EXAM:   Constitutional: Well-developed well-nourished, no acute distress  Vital signs: T 99.1, pulse 67, RR 16, /68, weight 130 pounds, height 66 inches, BMI 21  Eyes: Conjunctiva normal, sclera nonicteric  ENMT: Hearing grossly normal, oral mucosa moist  Neck: Supple, no palpable mass, trachea midline  Respiratory: Clear to auscultation, normal inspiratory effort  Cardiovascular: Regular rate, no murmur, no carotid bruit  Gastrointestinal: Soft, minimally tender to palpation at McBurney's point, nontender elsewhere, no palpable mass, no hepatosplenomegaly, negative for hernia, bowel sounds normal  Lymphatics (palpable nodes):  cervical-negative  Skin:  Warm, dry, no rash on visualized skin surfaces  Musculoskeletal: Symmetric  strength  Psychiatric: Alert and oriented ×3, normal affect     MELANY DELGADO M.D.

## 2019-11-12 NOTE — ANESTHESIA PREPROCEDURE EVALUATION
Anesthesia Evaluation     Patient summary reviewed   NPO Solid Status: > 8 hours  NPO Liquid Status: > 8 hours           Airway   Mallampati: II  Neck ROM: full  No difficulty expected  Dental - normal exam     Pulmonary     breath sounds clear to auscultation  (+) asthma,  Cardiovascular     Rhythm: regular        Neuro/Psych  (+) headaches,     GI/Hepatic/Renal/Endo      Musculoskeletal     Abdominal    Substance History      OB/GYN          Other                      Anesthesia Plan    ASA 2     general     intravenous induction     Anesthetic plan, all risks, benefits, and alternatives have been provided, discussed and informed consent has been obtained with: patient.

## 2019-11-12 NOTE — ED PROVIDER NOTES
EMERGENCY DEPARTMENT ENCOUNTER    Room Number:  25/25  Date seen:  11/12/2019  Time seen: 11:00 AM  PCP: Darvin Scott MD  Historian: patient       HPI:  Chief Complaint: abdominal pain  A complete HPI/ROS/PMH/PSH/SH/FH are unobtainable due to: nothing  Context: Bogdan Garcia is a 29 y.o. female who presents to the ED c/o RLQ pain since last night.  The pain started across the upper abdomen, then today when she went to urgent care she noticed she was really tender in the right lowe abdomen when they examined her.  She denies n/v.  No diarrhea.  No fever or chills. No prior abdominal surgeries.  No current alcohol or tobacco use.  No dysuria. LMP 3 weeks ago, no abnormal vaginal bleeding currently.        PAST MEDICAL HISTORY  Active Ambulatory Problems     Diagnosis Date Noted   • Atypical migraine 04/27/2016   • Disorder of autonomic nervous system 04/27/2016     Resolved Ambulatory Problems     Diagnosis Date Noted   • No Resolved Ambulatory Problems     Past Medical History:   Diagnosis Date   • Asthma, exercise induced    • Migraine    • Vaginal delivery          PAST SURGICAL HISTORY  History reviewed. No pertinent surgical history.      FAMILY HISTORY  Family History   Problem Relation Age of Onset   • Diabetes Other    • Heart disease Other    • Hypertension Other    • Other Mother         Phlebitis   • Ovarian cancer Maternal Grandmother 50   • Diabetes Maternal Grandfather    • Coronary artery disease Maternal Grandfather    • Heart attack Maternal Grandfather    • Stroke Cousin    • No Known Problems Son    • No Known Problems Father    • No Known Problems Sister    • Lung cancer Paternal Grandmother    • No Known Problems Paternal Grandfather          SOCIAL HISTORY  Social History     Socioeconomic History   • Marital status: Single     Spouse name: Not on file   • Number of children: Not on file   • Years of education: Not on file   • Highest education level: Not on file   Tobacco Use   •  Smoking status: Former Smoker     Packs/day: 0.50     Types: Cigars     Last attempt to quit: 2015     Years since quittin.8   • Smokeless tobacco: Former User   Substance and Sexual Activity   • Alcohol use: No   • Drug use: No   • Sexual activity: No     Birth control/protection: None         ALLERGIES  Monistat [miconazole]        REVIEW OF SYSTEMS  Review of Systems   Constitutional: Negative for diaphoresis and fever.   HENT: Negative for congestion.    Eyes: Negative for visual disturbance.   Respiratory: Negative for shortness of breath.    Cardiovascular: Negative for palpitations.   Gastrointestinal: Positive for abdominal pain. Negative for blood in stool and vomiting.   Endocrine: Negative for polyuria.   Genitourinary: Negative for flank pain.   Musculoskeletal: Negative for joint swelling.   Skin: Negative for wound.   Neurological: Negative for seizures.   Hematological: Negative for adenopathy.   Psychiatric/Behavioral: Negative for sleep disturbance.            PHYSICAL EXAM  ED Triage Vitals [19 1023]   Temp Heart Rate Resp BP SpO2   99.1 °F (37.3 °C) 67 16 122/68 100 %      Temp src Heart Rate Source Patient Position BP Location FiO2 (%)   Tympanic -- -- -- --         GENERAL: awake and alert, no acute distress  HENT: nares patent  EYES: no scleral icterus  CV: regular rhythm, normal rate  RESPIRATORY: normal effort  ABDOMEN: soft, mild tenderness in RLQ with rebound tenderness present, no other tenderness, nondistended  MUSCULOSKELETAL: no deformity  NEURO: alert, moves all extremities, follows commands  SKIN: warm, dry    Vital signs and nursing notes reviewed.          LAB RESULTS  Recent Results (from the past 24 hour(s))   CBC AND DIFFERENTIAL    Collection Time: 19  9:42 AM   Result Value Ref Range    WBC 17.0 (H) 4.5 - 11.0 10*3/uL    RBC 4.76 4.0 - 5.2 10*6/uL    Hemoglobin 14.9 12.0 - 16.0 g/dL    Hematocrit 44.4 36.0 - 46.0 %    MCV 93.1 80.0 - 100.0 fL    MCH 31.2 26.0 -  34.0 pg    MCHC 33.6 31.0 - 37.0 g/dL    RDW 12.4 12.0 - 16.8 %    Platelets 190 140 - 440 10*3/uL    MPV 8.3 6.7 - 10.8 fL    Differential Type Physician Office CBC w/AutoDiff (arb'U)    Immature Grans % 84.1 (H) 42 - 75 %    Lymphocyte Rel % 8.7 (L) 15 - 50 %    Monocyte Rel % 7.2 0 - 15 %    Immature Grans, Absolute 14.30 (H) 1.4 - 6.5 10*3/uL    Lymphocytes Absolute 1.50 0.7 - 5.5 10*3/uL    Monocytes Absolute 1.20 0.0 - 1.7 10*3/uL   Urinalysis With Microscopic If Indicated (No Culture) - Urine, Clean Catch    Collection Time: 11/12/19 11:04 AM   Result Value Ref Range    Color, UA Yellow Yellow, Straw    Appearance, UA Clear Clear    pH, UA 5.5 5.0 - 8.0    Specific Gravity, UA 1.009 1.005 - 1.030    Glucose, UA Negative Negative    Ketones, UA Negative Negative    Bilirubin, UA Negative Negative    Blood, UA Negative Negative    Protein, UA Negative Negative    Leuk Esterase, UA Negative Negative    Nitrite, UA Negative Negative    Urobilinogen, UA 0.2 E.U./dL 0.2 - 1.0 E.U./dL   Comprehensive Metabolic Panel    Collection Time: 11/12/19 11:10 AM   Result Value Ref Range    Glucose 93 65 - 99 mg/dL    BUN 10 6 - 20 mg/dL    Creatinine 0.92 0.57 - 1.00 mg/dL    Sodium 136 136 - 145 mmol/L    Potassium 4.3 3.5 - 5.2 mmol/L    Chloride 98 98 - 107 mmol/L    CO2 26.2 22.0 - 29.0 mmol/L    Calcium 9.4 8.6 - 10.5 mg/dL    Total Protein 7.5 6.0 - 8.5 g/dL    Albumin 4.90 3.50 - 5.20 g/dL    ALT (SGPT) 9 1 - 33 U/L    AST (SGOT) 18 1 - 32 U/L    Alkaline Phosphatase 63 39 - 117 U/L    Total Bilirubin 0.8 0.2 - 1.2 mg/dL    eGFR Non African Amer 72 >60 mL/min/1.73    Globulin 2.6 gm/dL    A/G Ratio 1.9 g/dL    BUN/Creatinine Ratio 10.9 7.0 - 25.0    Anion Gap 11.8 5.0 - 15.0 mmol/L   hCG, Serum, Qualitative    Collection Time: 11/12/19 11:10 AM   Result Value Ref Range    HCG Qualitative Negative Negative       Ordered the above labs and reviewed the results.        RADIOLOGY  Ct Abdomen Pelvis With Contrast    Result  Date: 11/12/2019  CT ABDOMEN AND PELVIS WITH CONTRAST  HISTORY: Right lower quadrant abdominal pain.  TECHNIQUE: Axial CT images of the abdomen and pelvis were obtained following administration of intravenous contrast. The patient was not given oral contrast Coronal and sagittal reformats were obtained.  COMPARISON: None.  FINDINGS: The appendix is distended with wall thickening and enhancement measuring up to 9 mm. There is periappendiceal fat stranding and small amount of fluid present. These findings are consistent with acute appendicitis. No evidence of bowel obstruction.  The liver, spleen, pancreas and the gallbladder is normal. Bilateral adrenal glands are normal. 3 mm nephrolith within the left kidney. Mild bilateral pelvocaliectasis is present. The urinary bladder is partially distended and normal. The uterus is anteverted and normal. The adnexa are unremarkable.      CT findings suggestive of acute appendicitis.  These findings were discussed with Dr. Leahy by telephone.  Radiation dose reduction techniques were utilized, including automated exposure control and exposure modulation based on body size.         Ordered the above noted radiological studies. Reviewed by me in PACS.            PROCEDURES  Procedures              MEDICATIONS GIVEN IN ER  Medications   sodium chloride 0.9 % flush 10 mL (not administered)   sodium chloride 0.9 % bolus 1,000 mL (1,000 mL Intravenous New Bag 11/12/19 1113)   iopamidol (ISOVUE-300) 61 % injection 100 mL (85 mL Intravenous Given 11/12/19 1208)                   PROGRESS AND CONSULTS  ED Course as of Nov 12 1250   Tue Nov 12, 2019   1229 I discussed with patient the diagnosis of appendicitis and the plan to go to the operating room today.  She again declined pain medication at this time.  [JR]      ED Course User Index  [JR] Gino Leahy MD     1234 Discussed with Dr. Pena (General Surgery) who agrees to admit the pt.       MEDICAL DECISION MAKING    29  yo F w/ RLQ pain, rebound tenderness on exam, outpatient WBC 17 this morning.  DDx includes appendicitis, TOA, ectopic, ovarian torsion, ruptured or hemorrhagic ovarian cyst.  CMP, UA, and CT abdomen ordered.  Patient declined pain medication.     MDM       29-year-old female presents with focal right lower quadrant tenderness and rebound tenderness.  White blood cell count is 17.  Other labs today reassuring.  CT the abdomen pelvis reveals findings of acute appendicitis.  Surgery consulted and will take the patient to the operating room today.  She has declined pain medication here.    DIAGNOSIS  Final diagnoses:   Acute appendicitis with localized peritonitis, without perforation, abscess, or gangrene         DISPOSITION  ADMISSION    Discussed treatment plan and reason for admission with pt/family and admitting physician.  Pt/family voiced understanding of the plan for admission for further testing/treatment as needed.         Latest Documented Vital Signs:  As of 12:50 PM  BP- 123/84 HR- 67 Temp- 99.1 °F (37.3 °C) (Tympanic) O2 sat- 100%        --    Please note that portions of this were completed with a voice recognition program.          Chandan Chaney  11/12/19 1251       Gino Leahy MD  11/12/19 6342

## 2019-11-12 NOTE — DISCHARGE INSTRUCTIONS
Dr. Lorenzo Pena  [address]  [place], [place] [address] [phone]    Discharge Instructions for Gall Bladder Surgery    1. Go home, rest and take it easy today; however, you should get up and move about several times today to reduce the risk of developing a clot in your legs.      2. You may experience some dizziness or memory loss from the anesthesia.  This may last for the next 24 hours.  Someone should plan on staying with you for the first 24 hours for your safety.    3. Do not make any important legal decisions or sign any legal papers for the next 24 hours.      4. Eat and drink lightly today.  Start off with liquids, jello, soup, crackers or other bland foods at first. You may advance your diet tomorrow as tolerated as long as you do not experience any nausea or vomiting.     5. If skin glue (Dermabond) was used, your incisions are protected and covered.  The invisible glue will dissolve on its own as your incision heals. If you have dressings, you may remove your outer dressings in 3 days.  The white tapes called steri-strips should stay in place.  They will fall off on their own in 1-2 weeks.  Do not worry if they come off sooner.      6. If you have dressings, you may notice some bleeding/drainage on your outer dressings. A little bloody drainage is normal. If the bleeding/drainage is such that the bandage cannot absorb it, remove the dressing, apply clean gauze and apply firm pressure for a full 15 minutes.  If the bleeding continues, please call me.    7. You may shower tomorrow allowing water to run over the incisions; however, do not scrub the incisions.   No tub baths until your incisions are completely healed.         8. You have received a prescription for a narcotic pain medicine, as you will have some pain following surgery.   You will not be totally pain free, but your pain medicine should make the pain tolerable.  Please take your pain medicine as prescribed and always take  your pills with food to prevent nausea. If you are having severe pain that cannot be controlled by the pain medicine, please contact me.      9. You have also received a prescription for an anti-nausea medicine.  Please take this as prescribed for any nausea or vomiting.  Nausea could be a result of the anesthesia or a result of the narcotic pain medicine.  If you experience severe nausea and vomiting that cannot be controlled by the nausea medicine, please call me.      10. It is not unusual to experience pain/discomfort in your shoulders or under your ribs after surgery.  It is from the gas used during the laparoscopic procedure and usually lasts 1-3 days.  The prescription pain medicine is used to treat the surgical pain and does not typically alleviate this “gassy” pain.     11. No driving for 24 hours and for as long as you are taking your prescription pain medicine.      12. You will need to call the office at [phone] to schedule a follow-up appointment in 6-10 days.     13. Remember to contact me for any of the following:    • Fever > 101 degrees  • Severe pain that cannot be controlled by taking your pain pills  • Severe nausea or vomiting that cannot be controlled by taking your nausea pills  • Significant bleeding of your incisions  • Drainage that has a bad smell or is yellow or green in appearance  • Any other questions or concerns

## 2019-11-12 NOTE — ED NOTES
PT taken to OR. Pt hs abd pain. VS stable. Pt mother is with her      Alexander Hall RN  11/12/19 1300

## 2019-11-12 NOTE — ANESTHESIA PROCEDURE NOTES
Airway  Urgency: elective    Date/Time: 11/12/2019 2:00 PM  Airway not difficult    General Information and Staff    Patient location during procedure: OR  Anesthesiologist: Rachael Rob MD  CRNA: Lamont Lantigua CRNA    Indications and Patient Condition  Indications for airway management: airway protection    Preoxygenated: yes  MILS maintained throughout  Mask difficulty assessment: 1 - vent by mask    Final Airway Details  Final airway type: endotracheal airway      Successful airway: ETT  Cuffed: yes   Successful intubation technique: direct laryngoscopy  Facilitating devices/methods: intubating stylet  Endotracheal tube insertion site: oral  Blade: Fortino  Blade size: 3  ETT size (mm): 7.0  Cormack-Lehane Classification: grade I - full view of glottis  Placement verified by: chest auscultation and capnometry   Cuff volume (mL): 6  Measured from: lips  ETT/EBT  to lips (cm): 20  Number of attempts at approach: 1  Assessment: lips, teeth, and gum same as pre-op and atraumatic intubation

## 2019-11-13 LAB
CYTO UR: NORMAL
LAB AP CASE REPORT: NORMAL
PATH REPORT.FINAL DX SPEC: NORMAL
PATH REPORT.GROSS SPEC: NORMAL

## 2019-11-20 ENCOUNTER — TELEPHONE (OUTPATIENT)
Dept: SURGERY | Facility: CLINIC | Age: 29
End: 2019-11-20

## 2019-11-20 NOTE — TELEPHONE ENCOUNTER
Pt called in stating that she is extremely constipated and has developed hemorrhoids. S/P laparoscopic appendectomy on 11/12/19. She stated that she hasn't been taking any stool softeners Advised her to take magnesium citrate to see if that would help her have a bowel movement and to take sitz baths for her hemorrhoids. Pt has an appt to see Dr. Pena tomorrow.

## 2019-11-21 ENCOUNTER — OFFICE VISIT (OUTPATIENT)
Dept: SURGERY | Facility: CLINIC | Age: 29
End: 2019-11-21

## 2019-11-21 DIAGNOSIS — Z09 FOLLOW UP: Primary | ICD-10-CM

## 2019-11-21 PROCEDURE — 99024 POSTOP FOLLOW-UP VISIT: CPT | Performed by: SURGERY

## 2019-11-22 NOTE — PROGRESS NOTES
Postoperative visit    11/12/2019 laparoscopic appendectomy    Pathology: Acute necrotizing appendicitis    Office visit: She reports principal issue postoperatively is severe constipation.  She is also had bloating, appropriate level of abdominal pain, and fatigue.  All of her presenting complaints today are consistent with her stage of postoperative recovery.  I recommended starting MiraLAX twice daily for constipation and tapering off when her bowel function returns to baseline.

## 2020-01-16 DIAGNOSIS — N30.00 ACUTE CYSTITIS WITHOUT HEMATURIA: ICD-10-CM

## 2020-01-16 DIAGNOSIS — F41.0 PANIC DISORDER: ICD-10-CM

## 2020-01-16 DIAGNOSIS — G90.9 DISORDER OF AUTONOMIC NERVOUS SYSTEM: ICD-10-CM

## 2020-01-16 DIAGNOSIS — G43.009 ATYPICAL MIGRAINE: ICD-10-CM

## 2020-01-17 ENCOUNTER — TELEPHONE (OUTPATIENT)
Dept: SPORTS MEDICINE | Facility: CLINIC | Age: 30
End: 2020-01-17

## 2020-01-17 DIAGNOSIS — G43.009 ATYPICAL MIGRAINE: ICD-10-CM

## 2020-01-17 DIAGNOSIS — G90.9 DISORDER OF AUTONOMIC NERVOUS SYSTEM: ICD-10-CM

## 2020-01-17 DIAGNOSIS — F41.0 PANIC DISORDER: ICD-10-CM

## 2020-01-17 DIAGNOSIS — N30.00 ACUTE CYSTITIS WITHOUT HEMATURIA: ICD-10-CM

## 2020-01-17 RX ORDER — SERTRALINE HYDROCHLORIDE 25 MG/1
25 TABLET, FILM COATED ORAL DAILY
Qty: 90 TABLET | Refills: 3 | OUTPATIENT
Start: 2020-01-17

## 2020-01-17 NOTE — TELEPHONE ENCOUNTER
Patient wants to know if you can send in sertraline because she doesn't have any more refills. Please advise, thank you!

## 2020-01-20 RX ORDER — SERTRALINE HYDROCHLORIDE 25 MG/1
25 TABLET, FILM COATED ORAL DAILY
Qty: 90 TABLET | Refills: 3 | Status: SHIPPED | OUTPATIENT
Start: 2020-01-20 | End: 2021-04-22

## 2020-01-27 DIAGNOSIS — F41.0 PANIC DISORDER: ICD-10-CM

## 2020-01-27 DIAGNOSIS — G43.009 ATYPICAL MIGRAINE: ICD-10-CM

## 2020-01-27 DIAGNOSIS — G90.9 DISORDER OF AUTONOMIC NERVOUS SYSTEM: ICD-10-CM

## 2020-01-27 DIAGNOSIS — N30.00 ACUTE CYSTITIS WITHOUT HEMATURIA: ICD-10-CM

## 2020-01-28 RX ORDER — FLUDROCORTISONE ACETATE 0.1 MG/1
TABLET ORAL
Qty: 135 TABLET | Refills: 0 | Status: SHIPPED | OUTPATIENT
Start: 2020-01-28 | End: 2021-01-18

## 2020-06-26 ENCOUNTER — OFFICE VISIT (OUTPATIENT)
Dept: SPORTS MEDICINE | Facility: CLINIC | Age: 30
End: 2020-06-26

## 2020-06-26 DIAGNOSIS — F41.9 ANXIETY: Primary | ICD-10-CM

## 2020-06-26 PROCEDURE — 99441 PR PHYS/QHP TELEPHONE EVALUATION 5-10 MIN: CPT | Performed by: FAMILY MEDICINE

## 2020-06-26 RX ORDER — HYDROXYZINE HYDROCHLORIDE 10 MG/1
10 TABLET, FILM COATED ORAL EVERY 8 HOURS PRN
Qty: 90 TABLET | Refills: 11 | Status: SHIPPED | OUTPATIENT
Start: 2020-06-26 | End: 2020-11-25

## 2020-06-26 NOTE — PROGRESS NOTES
You have chosen to receive care through a telephone visit. Do you consent to use a telephone visit for your medical care today? Yes      Telephone Visit Note    CC: anxiety    History of Present Illness  Over the past week patient has been having more episodes of being very anxious.  Finds herself having to take a lot of deep breaths.  No palpitations, no syncope or near syncope.  She has noticed some perioral and hand tingling from time to time.  She is not really short of breath per se.  She has tried albuterol inhaler which has not helped.  She currently is only taking sertraline 12.5 mg a day.  I have encouraged her to increase this to 25 mg a day and will add hydroxyzine that she can take 1 every 8 hours as needed and 2 at bedtime if needed for sleep.  We will plan to follow-up in about 3 weeks if no significant improvement.      Diagnoses and all orders for this visit:    Anxiety  -     hydrOXYzine (ATARAX) 10 MG tablet; Take 1 tablet by mouth Every 8 (Eight) Hours As Needed for Anxiety.          This patient was evaluated by telephone due to current precautions with COVID-19.  Consent to telephone visit for this problem was given by the patient. I spent a total of 8 minutes on the phone with the patient.

## 2020-07-23 ENCOUNTER — TELEPHONE (OUTPATIENT)
Dept: SPORTS MEDICINE | Facility: CLINIC | Age: 30
End: 2020-07-23

## 2020-07-23 NOTE — TELEPHONE ENCOUNTER
Pt believes she had strep throat, her mother was diagnosed with it recently. She would prefer not to do a in office visit. Does she need to set up [facility] or can you call something in. Please advise.

## 2020-07-24 RX ORDER — AMOXICILLIN 875 MG/1
875 TABLET, COATED ORAL 2 TIMES DAILY
Qty: 20 TABLET | Refills: 0 | Status: SHIPPED | OUTPATIENT
Start: 2020-07-24 | End: 2020-11-25

## 2020-11-24 ENCOUNTER — TELEPHONE (OUTPATIENT)
Dept: SPORTS MEDICINE | Facility: CLINIC | Age: 30
End: 2020-11-24

## 2020-11-24 NOTE — TELEPHONE ENCOUNTER
Caller: MARIE CONRAD    Relationship to patient: SELF    Best call back number: [phone]    Chief complaint: ANXIETY AND POSSIBLE BRONCHITIS    Type of visit: TELEHEALTH    Requested date: ASAP     If rescheduling, when is the original appointment: N/A     Additional notes:PT CALLED TO ADVISE SHE HAS TWO ISSUES.   1) ANXIETY: SHE'S NOT SLEEPING WELL AND DOESN'T WANT TO TAKE ANYTHING ADDICTIVE. STATED HER ANXIETY MEDS SHE WAS PRESCRIBED AREN'T HELPING AND WEARING OFF AFTER 3PM, WITH SIDE EFFECTS OF MOODINESS.     2) SHE'S NOTICED AFTER RUNNING OUTSIDE SHE'S EXPERIENCING EAR ACHES, RASPY THROAT, COUGHING/WHEEZING, AND USUALLY HAS BRONCHITIS THIS TIME OF YEAR. SHE'S EXPERIENCING CHEST TIGHTNESS, BUT ADV SHE'S EXPERIENCED THIS SINCE AUGUST, WHICH SHE'S PREVIOUSLY DISCUSSED WITH DR. LYLA TATE FROM ANXIETY.

## 2020-11-25 ENCOUNTER — OFFICE VISIT (OUTPATIENT)
Dept: SPORTS MEDICINE | Facility: CLINIC | Age: 30
End: 2020-11-25

## 2020-11-25 DIAGNOSIS — R05.9 COUGH: ICD-10-CM

## 2020-11-25 DIAGNOSIS — F41.9 ANXIETY: Primary | ICD-10-CM

## 2020-11-25 DIAGNOSIS — G47.9 DIFFICULTY SLEEPING: ICD-10-CM

## 2020-11-25 PROCEDURE — 99442 PR PHYS/QHP TELEPHONE EVALUATION 11-20 MIN: CPT | Performed by: FAMILY MEDICINE

## 2020-11-25 RX ORDER — METHYLPREDNISOLONE 4 MG/1
TABLET ORAL
Qty: 21 TABLET | Refills: 0 | Status: SHIPPED | OUTPATIENT
Start: 2020-11-25 | End: 2021-04-22

## 2020-11-25 RX ORDER — TRAZODONE HYDROCHLORIDE 50 MG/1
25 TABLET ORAL NIGHTLY PRN
Qty: 30 TABLET | Refills: 0 | Status: SHIPPED | OUTPATIENT
Start: 2020-11-25 | End: 2021-09-08

## 2020-11-25 NOTE — PROGRESS NOTES
"Telephone Visit Note    CC: Anxiety    History of Present Illness  Vistaril \"gets me in a snappy mood.\" Sertraline 12.5 mg - \"feels like it make my heart race more at night.\" Not sleeping \"at all.\" Chest is tight \"all the time.\"    C/o cough, deep in chest. \"Raspiness\". States she gets bronchitis this time of year. No fever.    .    Diagnoses and all orders for this visit:    Anxiety    Difficulty sleeping  -     traZODone (DESYREL) 50 MG tablet; Take 0.5 tablets by mouth At Night As Needed for Sleep.    Cough  -     methylPREDNISolone (MEDROL) 4 MG dose pack; Take as directed on package instructions.      Record review, it was recommended to increase dosage to 25 mg sertraline. Has not been consistent w/taking whole pill. Also add trazodone.    Likely viral URI.     This patient was evaluated by telephone due to current precautions with COVID-19.  Consent to telephone visit for this problem was given by the patient. I spent a total of 11 minutes on the phone with the patient.    "

## 2021-01-11 ENCOUNTER — TELEPHONE (OUTPATIENT)
Dept: OBSTETRICS AND GYNECOLOGY | Age: 31
End: 2021-01-11

## 2021-01-11 NOTE — TELEPHONE ENCOUNTER
Called pt, wanted to see if she wanted to make an appt to see  tomorrow at 11:45?     If not she can keep appointment on Friday with Geneva Greene

## 2021-01-11 NOTE — TELEPHONE ENCOUNTER
Patient believes she has a bacteria infection, but pt is unsure.    she's also experiencing some nausea, cramping diarrhea, odor and dark discharge. Pt has had these symptoms for almost a week, wanted to know what  advised?     Pt also stated, that she's a single mother so she unable to find , wanted to know if  could possibly call her?     Call Back Number: [phone]

## 2021-01-11 NOTE — TELEPHONE ENCOUNTER
Tried to call pt. No answer and unable to leave message. Please call pt back. Have her check pregnancy test and go to ER if positive. If negative, she will need to make appt to evaluate further. Nausea and diarrhea are not usual symptoms of bacterial vaginitis.

## 2021-01-12 ENCOUNTER — OFFICE VISIT (OUTPATIENT)
Dept: OBSTETRICS AND GYNECOLOGY | Age: 31
End: 2021-01-12

## 2021-01-12 VITALS
BODY MASS INDEX: 21.31 KG/M2 | DIASTOLIC BLOOD PRESSURE: 60 MMHG | HEIGHT: 66 IN | WEIGHT: 132.6 LBS | SYSTOLIC BLOOD PRESSURE: 110 MMHG

## 2021-01-12 DIAGNOSIS — R10.2 PELVIC PAIN: Primary | ICD-10-CM

## 2021-01-12 DIAGNOSIS — N89.8 VAGINAL ODOR: ICD-10-CM

## 2021-01-12 DIAGNOSIS — Z11.51 ENCOUNTER FOR SCREENING FOR HUMAN PAPILLOMAVIRUS (HPV): ICD-10-CM

## 2021-01-12 LAB
B-HCG UR QL: NEGATIVE
BILIRUB BLD-MCNC: NEGATIVE MG/DL
CLARITY, POC: CLEAR
COLOR UR: YELLOW
GLUCOSE UR STRIP-MCNC: NEGATIVE MG/DL
INTERNAL NEGATIVE CONTROL: NEGATIVE
INTERNAL POSITIVE CONTROL: POSITIVE
KETONES UR QL: NEGATIVE
LEUKOCYTE EST, POC: NEGATIVE
Lab: NORMAL
NITRITE UR-MCNC: NEGATIVE MG/ML
PH UR: 7 [PH] (ref 5–8)
PROT UR STRIP-MCNC: NEGATIVE MG/DL
RBC # UR STRIP: NEGATIVE /UL
SP GR UR: 1.02 (ref 1–1.03)
UROBILINOGEN UR QL: NORMAL

## 2021-01-12 PROCEDURE — 99213 OFFICE O/P EST LOW 20 MIN: CPT | Performed by: OBSTETRICS & GYNECOLOGY

## 2021-01-12 PROCEDURE — 81002 URINALYSIS NONAUTO W/O SCOPE: CPT | Performed by: OBSTETRICS & GYNECOLOGY

## 2021-01-12 PROCEDURE — 81025 URINE PREGNANCY TEST: CPT | Performed by: OBSTETRICS & GYNECOLOGY

## 2021-01-12 NOTE — PROGRESS NOTES
"Chief complaint:vaginal odor and pelvic cramping    HPI  Bogdan Garcia is a 30 y.o. female presents for evaluation of recent discolored discharge and odor. She is also having some pelvic cramping. She is also having urinary frequency. She is no longer in a relationship. She has not had intercourse since last month. Her prior partner had male factor infertility so she has not been using contraception. She denies any fever/chills. She noted the symptoms intermittently a few days ago but they seem better lately. She had some loose stools but notes it could be nerves. She denies n/v or fevers.    She is still working as a . Her son is 9. 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        The following portions of the patient's history were reviewed and updated as appropriate: allergies, current medications, past family history, past medical history, past social history, past surgical history and problem list.    Review of Systems  Constitutional: negative for chills and fevers  Respiratory: negative for cough  Gastrointestinal: positive for diarrhea, negative for abdominal pain, nausea and vomiting  Genitourinary:positive for vaginal odor, discolored discharge, pelvic cramping, negative for abnormal bleeding  Neurological: negative for headaches    /60   Ht 167.6 cm (66\")   Wt 60.1 kg (132 lb 9.6 oz)   LMP 12/20/2020   Breastfeeding No   BMI 21.40 kg/m²         Physical Exam  Constitutional:       Appearance: Normal appearance.   HENT:      Head: Normocephalic and atraumatic.   Neck:      Musculoskeletal: Neck supple.   Cardiovascular:      Rate and Rhythm: Normal rate and regular rhythm.      Heart sounds: Normal heart sounds.   Pulmonary:      Effort: Pulmonary effort is normal.      Breath sounds: Normal breath sounds.   Abdominal:      General: There is no distension.      Palpations: Abdomen is soft.      Tenderness: There is no abdominal tenderness. There is no " guarding.   Genitourinary:     Comments: Normal external female genitalia, normal vagina. No CMT. No purulent cervical discharge. Normal uterus and ovaries. No tenderness noted.   Musculoskeletal:      Comments: No CVA tenderness   Skin:     General: Skin is warm and dry.   Neurological:      General: No focal deficit present.      Mental Status: She is alert and oriented to person, place, and time.   Psychiatric:         Mood and Affect: Mood normal.         Behavior: Behavior normal.             Diagnoses and all orders for this visit:    1. Pelvic pain (Primary)  -     POC Pregnancy, Urine  -     NuSwab VG+ - Swab, Vagina    2. Encounter for screening for human papillomavirus (HPV)  -     IGP, Apt HPV,rfx 16 / 18,45    3. Vaginal odor  -     NuSwab VG+ - Swab, Vagina      Urine HCG done and negative. Will check nuswab and UA for further evaluation.     Pap done as pt is overdue for annual exam. Recommend she schedule return appt in 2 weeks for f/u and annual    Addendum: UA done and completely negative. Will f/u nuswab. Pt advised to call if symptoms worsen.

## 2021-01-15 LAB
A VAGINAE DNA VAG QL NAA+PROBE: NORMAL SCORE
BVAB2 DNA VAG QL NAA+PROBE: NORMAL SCORE
C ALBICANS DNA VAG QL NAA+PROBE: NEGATIVE
C GLABRATA DNA VAG QL NAA+PROBE: NEGATIVE
C TRACH DNA VAG QL NAA+PROBE: NEGATIVE
CYTOLOGIST CVX/VAG CYTO: NORMAL
CYTOLOGY CVX/VAG DOC CYTO: NORMAL
CYTOLOGY CVX/VAG DOC THIN PREP: NORMAL
DX ICD CODE: NORMAL
HIV 1 & 2 AB SER-IMP: NORMAL
HPV I/H RISK 4 DNA CVX QL PROBE+SIG AMP: NEGATIVE
Lab: NORMAL
MEGA1 DNA VAG QL NAA+PROBE: NORMAL SCORE
N GONORRHOEA DNA VAG QL NAA+PROBE: NEGATIVE
OTHER STN SPEC: NORMAL
STAT OF ADQ CVX/VAG CYTO-IMP: NORMAL
T VAGINALIS DNA VAG QL NAA+PROBE: NEGATIVE

## 2021-01-18 ENCOUNTER — TELEPHONE (OUTPATIENT)
Dept: OBSTETRICS AND GYNECOLOGY | Age: 31
End: 2021-01-18

## 2021-01-18 DIAGNOSIS — G43.009 ATYPICAL MIGRAINE: ICD-10-CM

## 2021-01-18 DIAGNOSIS — F41.0 PANIC DISORDER: ICD-10-CM

## 2021-01-18 DIAGNOSIS — N30.00 ACUTE CYSTITIS WITHOUT HEMATURIA: ICD-10-CM

## 2021-01-18 DIAGNOSIS — G90.9 DISORDER OF AUTONOMIC NERVOUS SYSTEM: ICD-10-CM

## 2021-01-18 RX ORDER — FLUDROCORTISONE ACETATE 0.1 MG/1
TABLET ORAL
Qty: 135 TABLET | Refills: 3 | Status: SHIPPED | OUTPATIENT
Start: 2021-01-18 | End: 2022-03-01

## 2021-01-18 NOTE — TELEPHONE ENCOUNTER
----- Message from Violet Xiao MD sent at 1/15/2021  5:38 PM EST -----  Called pt and no answer. Unable to leave message. Please advise pt that her nuswab and pap results are all negative/normal

## 2021-02-25 ENCOUNTER — TELEPHONE (OUTPATIENT)
Dept: SPORTS MEDICINE | Facility: CLINIC | Age: 31
End: 2021-02-25

## 2021-02-25 NOTE — TELEPHONE ENCOUNTER
Patient called in wanting to know if you can call something in for her prior to her appointment with you tomorrow morning. She is having throbbing pain and states its making her whole body hurt.     Thanks  Kylie

## 2021-02-25 NOTE — TELEPHONE ENCOUNTER
Attempted to contact patient via phone, but no answer when called. Left a detailed voicemail stating Dr. Lacy response, and it would be better to evaluate her in office tomorrow before sending in any medications. Voiced to return call to office with any further questions or concerns.    Thanks  Kylie

## 2021-02-26 ENCOUNTER — OFFICE VISIT (OUTPATIENT)
Dept: SPORTS MEDICINE | Facility: CLINIC | Age: 31
End: 2021-02-26

## 2021-02-26 DIAGNOSIS — H69.93 EUSTACHIAN TUBE DISORDER, BILATERAL: Primary | ICD-10-CM

## 2021-02-26 PROCEDURE — 99441 PR PHYS/QHP TELEPHONE EVALUATION 5-10 MIN: CPT | Performed by: FAMILY MEDICINE

## 2021-02-26 RX ORDER — HYDROXYZINE HYDROCHLORIDE 10 MG/1
10 TABLET, FILM COATED ORAL
COMMUNITY
Start: 2021-02-25 | End: 2021-12-28

## 2021-02-26 RX ORDER — TRIAMCINOLONE ACETONIDE 55 UG/1
2 SPRAY, METERED NASAL DAILY
Qty: 16.9 ML | Refills: 11 | Status: SHIPPED | OUTPATIENT
Start: 2021-02-26 | End: 2022-02-26

## 2021-02-26 NOTE — PROGRESS NOTES
You have chosen to receive care through a telephone visit. Do you consent to use a telephone visit for your medical care today? Yes        Telephone Visit Note    Chief Complaint   Patient presents with   • Earache     bilateral; started two days ago and has been taking advil and it has not helped         History of Present Illness  Patient has been having some sinus congestion and bilateral ear pain for the past 2 days.  Patient went to urgent care center last night diagnosed with bilateral otitis serous, and given Atarax to take at bedtime and continue with Advil.  Patient has her first Covid vaccine tomorrow 1 to make sure was okay to take the vaccine.  She is not having any fever or chills.  I suggested patient definitely get her vaccine.  I will call in a prescription for Nasacort.  If she is not seeing any significant improvement over the next 3 to 4 days then consider adding a Medrol Dosepak.      Diagnoses and all orders for this visit:    Eustachian tube disorder, bilateral  -     Triamcinolone Acetonide (NASACORT) 55 MCG/ACT nasal inhaler; 2 sprays into the nostril(s) as directed by provider Daily.    Other orders  -     hydrOXYzine (ATARAX) 10 MG tablet; Take 10 mg by mouth.          This patient was evaluated by telephone due to current precautions with COVID-19.  Consent to telephone visit for this problem was given by the patient. I spent a total of 8 minutes on the phone with the patient.

## 2021-04-22 ENCOUNTER — OFFICE VISIT (OUTPATIENT)
Dept: SPORTS MEDICINE | Facility: CLINIC | Age: 31
End: 2021-04-22

## 2021-04-22 DIAGNOSIS — F41.0 PANIC DISORDER: ICD-10-CM

## 2021-04-22 DIAGNOSIS — R04.2 HEMOPTYSIS: Primary | ICD-10-CM

## 2021-04-22 DIAGNOSIS — G90.9 DISORDER OF AUTONOMIC NERVOUS SYSTEM: ICD-10-CM

## 2021-04-22 DIAGNOSIS — G43.009 ATYPICAL MIGRAINE: ICD-10-CM

## 2021-04-22 DIAGNOSIS — R63.4 WEIGHT LOSS: ICD-10-CM

## 2021-04-22 DIAGNOSIS — N30.00 ACUTE CYSTITIS WITHOUT HEMATURIA: ICD-10-CM

## 2021-04-22 PROCEDURE — 99442 PR PHYS/QHP TELEPHONE EVALUATION 11-20 MIN: CPT | Performed by: FAMILY MEDICINE

## 2021-04-22 RX ORDER — SERTRALINE HYDROCHLORIDE 25 MG/1
TABLET, FILM COATED ORAL
Qty: 90 TABLET | Refills: 3 | Status: SHIPPED | OUTPATIENT
Start: 2021-04-22 | End: 2021-12-28

## 2021-04-22 NOTE — PROGRESS NOTES
You have chosen to receive care through a telephone visit. Do you consent to use a telephone visit for your medical care today? Yes    Telephone Visit Note    Chief Complaint   Patient presents with   • Cough     states that it started yesterday and started coughing with blood          History of Present Illness    Patient states yesterday out of the blue she started with a coughing episode that was nonproductive except towards the end of coughing she was leaning forward and she had bright red blood come up with a cough.  She has even had some bright red blood this morning but no cough this morning.  No fever chills or shortness of breath.  She has been under some stress resulting in decreased appetite and some weight loss but no abdominal pain.  No nausea or vomiting.  No melena or hematochezia.  Bowel movements normal.  Discussed with the patient that it is possible that the coughing episode was so hard that she has some capillary leakage but I do think it is prudent to check a CT scan of the chest and check labs listed below.    Diagnoses and all orders for this visit:    Hemoptysis  -     CT chest wo contrast; Future  -     QuantiFERON TB Gold; Future  -     CBC & Differential; Future  -     Comprehensive Metabolic Panel; Future  -     Sedimentation Rate; Future  -     C-reactive Protein; Future  -     Protime-INR; Future    Weight loss  -     CT chest wo contrast; Future  -     QuantiFERON TB Gold; Future  -     CBC & Differential; Future  -     Comprehensive Metabolic Panel; Future  -     Sedimentation Rate; Future  -     C-reactive Protein; Future  -     Protime-INR; Future          This patient was evaluated by telephone due to current precautions with COVID-19.  Consent to telephone visit for this problem was given by the patient. I spent a total of 12 minutes on the phone with the patient.

## 2021-04-28 ENCOUNTER — HOSPITAL ENCOUNTER (OUTPATIENT)
Dept: CT IMAGING | Facility: HOSPITAL | Age: 31
Discharge: HOME | End: 2021-04-28
Admitting: FAMILY MEDICINE

## 2021-04-28 DIAGNOSIS — R04.2 HEMOPTYSIS: ICD-10-CM

## 2021-04-28 DIAGNOSIS — R63.4 WEIGHT LOSS: ICD-10-CM

## 2021-04-28 PROCEDURE — 71250 CT THORAX DX C-: CPT

## 2021-05-17 NOTE — TELEPHONE ENCOUNTER
Abdomen , soft, nontender, nondistended , no guarding or rigidity , no masses palpable , normal bowel sounds , Liver and Spleen , no hepatomegaly present , liver nontender , spleen not palpable Dr Goyal pt, used the restroom and noticed a tiny worm like bug in the toilet after. Could this have been inside of pt? I would think she would have felt it coming out if so, but please advise.    Pt # [phone]

## 2021-07-07 ENCOUNTER — TELEPHONE (OUTPATIENT)
Dept: URGENT CARE | Facility: CLINIC | Age: 31
End: 2021-07-07

## 2021-07-07 DIAGNOSIS — J06.9 URTI (ACUTE UPPER RESPIRATORY INFECTION): Primary | ICD-10-CM

## 2021-07-07 RX ORDER — METHYLPREDNISOLONE 4 MG/1
TABLET ORAL
Qty: 1 EACH | Refills: 0 | Status: SHIPPED | OUTPATIENT
Start: 2021-07-07 | End: 2021-12-28

## 2021-07-07 RX ORDER — CHLORCYCLIZINE HYDROCHLORIDE AND PSEUDOEPHEDRINE HYDROCHLORIDE 25; 60 MG/1; MG/1
TABLET ORAL
Qty: 30 TABLET | Refills: 0 | Status: SHIPPED | OUTPATIENT
Start: 2021-07-07 | End: 2021-12-28

## 2021-07-14 ENCOUNTER — TELEPHONE (OUTPATIENT)
Dept: URGENT CARE | Facility: CLINIC | Age: 31
End: 2021-07-14

## 2021-07-14 DIAGNOSIS — J06.9 URTI (ACUTE UPPER RESPIRATORY INFECTION): Primary | ICD-10-CM

## 2021-07-14 RX ORDER — CEFDINIR 300 MG/1
CAPSULE ORAL
Qty: 20 CAPSULE | Refills: 0 | Status: SHIPPED | OUTPATIENT
Start: 2021-07-14 | End: 2021-12-28

## 2021-09-08 RX ORDER — TRAZODONE HYDROCHLORIDE 100 MG/1
TABLET ORAL
Qty: 30 TABLET | Refills: 5 | Status: SHIPPED | OUTPATIENT
Start: 2021-09-08 | End: 2021-12-28 | Stop reason: SINTOL

## 2021-10-01 ENCOUNTER — TELEPHONE (OUTPATIENT)
Dept: SPORTS MEDICINE | Facility: CLINIC | Age: 31
End: 2021-10-01

## 2021-10-04 RX ORDER — TIZANIDINE 4 MG/1
TABLET ORAL
Qty: 30 TABLET | Refills: 0 | Status: SHIPPED | OUTPATIENT
Start: 2021-10-04 | End: 2021-12-28

## 2021-12-28 ENCOUNTER — OFFICE VISIT (OUTPATIENT)
Dept: SPORTS MEDICINE | Facility: CLINIC | Age: 31
End: 2021-12-28

## 2021-12-28 VITALS
HEIGHT: 66 IN | SYSTOLIC BLOOD PRESSURE: 108 MMHG | DIASTOLIC BLOOD PRESSURE: 60 MMHG | TEMPERATURE: 98 F | HEART RATE: 57 BPM | OXYGEN SATURATION: 100 % | BODY MASS INDEX: 20.57 KG/M2 | WEIGHT: 128 LBS

## 2021-12-28 DIAGNOSIS — R90.89 ABNORMAL FINDING ON MRI OF BRAIN: Primary | ICD-10-CM

## 2021-12-28 DIAGNOSIS — G90.9 DISORDER OF AUTONOMIC NERVOUS SYSTEM: Chronic | ICD-10-CM

## 2021-12-28 DIAGNOSIS — R20.9 COLD EXTREMITIES: Chronic | ICD-10-CM

## 2021-12-28 DIAGNOSIS — R20.0 NUMBNESS IN BOTH HANDS: Chronic | ICD-10-CM

## 2021-12-28 DIAGNOSIS — R42 DIZZINESS: ICD-10-CM

## 2021-12-28 DIAGNOSIS — G43.009 ATYPICAL MIGRAINE: Chronic | ICD-10-CM

## 2021-12-28 PROBLEM — F41.9 ANXIETY: Chronic | Status: ACTIVE | Noted: 2020-06-26

## 2021-12-28 PROCEDURE — 99214 OFFICE O/P EST MOD 30 MIN: CPT | Performed by: FAMILY MEDICINE

## 2021-12-28 RX ORDER — BROMPHENIRAMINE MALEATE, PSEUDOEPHEDRINE HYDROCHLORIDE, AND DEXTROMETHORPHAN HYDROBROMIDE 2; 30; 10 MG/5ML; MG/5ML; MG/5ML
5 SYRUP ORAL
COMMUNITY
Start: 2021-12-26 | End: 2021-12-28

## 2021-12-28 RX ORDER — BROMPHENIRAMINE MALEATE, PSEUDOEPHEDRINE HYDROCHLORIDE, AND DEXTROMETHORPHAN HYDROBROMIDE 2; 30; 10 MG/5ML; MG/5ML; MG/5ML
SYRUP ORAL
COMMUNITY
Start: 2021-12-26 | End: 2021-12-28

## 2021-12-28 NOTE — PROGRESS NOTES
"Chief Complaint  Numbness (states that she is having numbness on bilateral; sometimes they clench and she has to shake her hands) and Headache (states that the headaches are now more constant and having dizziness as well)    Subjective          Bogdan Garcia presents to [facility]  History of Present Illness  1.  Patient continues to have bilateral hand numbness, this has been going on for almost a year now.  Has noted now that she has tingling entire hand bilaterally especially with use but can also occur at rest.  No swelling erythema.  2.  Patient had previous history of abnormal MRI brain with flair lesions, last MRI was 3 years ago.  Patient continues to have generalized headaches, when they occur it usually in the evening.  No nausea or vomiting.  Patient does have episodes of lightheadedness, she does have a history of mild autonomic dysfunction for which she takes Florinef for, she states this medication works well although she still has some breakthrough episodes of lightheadedness.  3.  Patient self discontinued trazodone because she felt it caused some hung over effect the next day, also has discontinued sertraline for anxiety.  Overall she states she is doing pretty well at this time other than not sleeping and the above complaints.  Objective   Vital Signs:   /60 (BP Location: Left arm, Patient Position: Sitting, Cuff Size: Adult)   Pulse 57   Temp 98 °F (36.7 °C) (Temporal)   Ht 167.6 cm (65.98\")   Wt 58.1 kg (128 lb)   SpO2 100%   BMI 20.67 kg/m²     Physical Exam  Vitals reviewed.   Constitutional:       Appearance: She is well-developed.   HENT:      Head: Normocephalic and atraumatic.   Eyes:      Conjunctiva/sclera: Conjunctivae normal.      Pupils: Pupils are equal, round, and reactive to light.   Cardiovascular:      Rate and Rhythm: Normal rate and regular rhythm.      Comments: No peripheral edema  Pulmonary:      Effort: Pulmonary effort is " normal.      Breath sounds: Normal breath sounds.   Skin:     General: Skin is warm and dry.   Neurological:      Mental Status: She is alert and oriented to person, place, and time.   Psychiatric:         Behavior: Behavior normal.        Result Review :                 Assessment and Plan    Diagnoses and all orders for this visit:    1. Abnormal finding on MRI of brain (Primary)  -     MRI brain wo contrast; Future  -     CBC & Differential  -     Comprehensive Metabolic Panel  -     TSH  -     T4, Free  -     Hemoglobin A1c  -     Vitamin B12  -     Folate  -     Iron Profile  -     C-reactive Protein  -     Sedimentation Rate  -     DAPHNEY With / DsDNA, RNP, Sjogrens A / B, Mackay    2. Dizziness  -     MRI brain wo contrast; Future  -     CBC & Differential  -     Comprehensive Metabolic Panel  -     TSH  -     T4, Free  -     Hemoglobin A1c  -     Vitamin B12  -     Folate  -     Iron Profile  -     C-reactive Protein  -     Sedimentation Rate  -     DAPHNEY With / DsDNA, RNP, Sjogrens A / B, Mackay    3. Atypical migraine  -     MRI brain wo contrast; Future  -     CBC & Differential  -     Comprehensive Metabolic Panel  -     TSH  -     T4, Free  -     Hemoglobin A1c  -     Vitamin B12  -     Folate  -     Iron Profile  -     C-reactive Protein  -     Sedimentation Rate  -     DAPHNEY With / DsDNA, RNP, Sjogrens A / B, Smith    4. Disorder of autonomic nervous system  -     MRI brain wo contrast; Future  -     CBC & Differential  -     Comprehensive Metabolic Panel  -     TSH  -     T4, Free  -     Hemoglobin A1c  -     Vitamin B12  -     Folate  -     Iron Profile  -     C-reactive Protein  -     Sedimentation Rate  -     DAPHNEY With / DsDNA, RNP, Sjogrens A / B, Mackay    5. Numbness in both hands  -     MRI brain wo contrast; Future  -     CBC & Differential  -     Comprehensive Metabolic Panel  -     TSH  -     T4, Free  -     Hemoglobin A1c  -     Vitamin B12  -     Folate  -     Iron Profile  -     C-reactive  Protein  -     Sedimentation Rate  -     DAPHNEY With / DsDNA, RNP, Sjogrens A / B, Smith  -     Doppler Arterial Single Level Upper Extremity - Bilateral CAR; Future    6. Cold extremities  -     EMG & Nerve Conduction Test; Future  -     Doppler Arterial Single Level Upper Extremity - Bilateral CAR; Future        Follow-up after the above testing.    Follow Up   No follow-ups on file.  Patient was given instructions and counseling regarding her condition or for health maintenance advice. Please see specific information pulled into the AVS if appropriate.

## 2021-12-29 LAB
ALBUMIN SERPL-MCNC: 4.8 G/DL (ref 3.8–4.8)
ALBUMIN/GLOB SERPL: 1.8 {RATIO} (ref 1.2–2.2)
ALP SERPL-CCNC: 78 IU/L (ref 44–121)
ALT SERPL-CCNC: 9 IU/L (ref 0–32)
ANA SER QL: NEGATIVE
AST SERPL-CCNC: 24 IU/L (ref 0–40)
BASOPHILS # BLD AUTO: 0.1 X10E3/UL (ref 0–0.2)
BASOPHILS NFR BLD AUTO: 1 %
BILIRUB SERPL-MCNC: 0.2 MG/DL (ref 0–1.2)
BUN SERPL-MCNC: 14 MG/DL (ref 6–20)
BUN/CREAT SERPL: 16 (ref 9–23)
CALCIUM SERPL-MCNC: 9.7 MG/DL (ref 8.7–10.2)
CHLORIDE SERPL-SCNC: 100 MMOL/L (ref 96–106)
CO2 SERPL-SCNC: 23 MMOL/L (ref 20–29)
CREAT SERPL-MCNC: 0.89 MG/DL (ref 0.57–1)
CRP SERPL-MCNC: 6 MG/L (ref 0–10)
EOSINOPHIL # BLD AUTO: 0.1 X10E3/UL (ref 0–0.4)
EOSINOPHIL NFR BLD AUTO: 2 %
ERYTHROCYTE [DISTWIDTH] IN BLOOD BY AUTOMATED COUNT: 11.6 % (ref 11.7–15.4)
ERYTHROCYTE [SEDIMENTATION RATE] IN BLOOD BY WESTERGREN METHOD: 2 MM/HR (ref 0–32)
FOLATE SERPL-MCNC: 6.8 NG/ML
GLOBULIN SER CALC-MCNC: 2.6 G/DL (ref 1.5–4.5)
GLUCOSE SERPL-MCNC: 91 MG/DL (ref 65–99)
HBA1C MFR BLD: 5.3 % (ref 4.8–5.6)
HCT VFR BLD AUTO: 43.4 % (ref 34–46.6)
HGB BLD-MCNC: 14.4 G/DL (ref 11.1–15.9)
IMM GRANULOCYTES # BLD AUTO: 0 X10E3/UL (ref 0–0.1)
IMM GRANULOCYTES NFR BLD AUTO: 0 %
IRON SATN MFR SERPL: 13 % (ref 15–55)
IRON SERPL-MCNC: 50 UG/DL (ref 27–159)
LYMPHOCYTES # BLD AUTO: 1.7 X10E3/UL (ref 0.7–3.1)
LYMPHOCYTES NFR BLD AUTO: 20 %
MCH RBC QN AUTO: 30.3 PG (ref 26.6–33)
MCHC RBC AUTO-ENTMCNC: 33.2 G/DL (ref 31.5–35.7)
MCV RBC AUTO: 91 FL (ref 79–97)
MONOCYTES # BLD AUTO: 0.6 X10E3/UL (ref 0.1–0.9)
MONOCYTES NFR BLD AUTO: 7 %
NEUTROPHILS # BLD AUTO: 6.1 X10E3/UL (ref 1.4–7)
NEUTROPHILS NFR BLD AUTO: 70 %
PLATELET # BLD AUTO: 207 X10E3/UL (ref 150–450)
POTASSIUM SERPL-SCNC: 4.4 MMOL/L (ref 3.5–5.2)
PROT SERPL-MCNC: 7.4 G/DL (ref 6–8.5)
RBC # BLD AUTO: 4.76 X10E6/UL (ref 3.77–5.28)
SODIUM SERPL-SCNC: 139 MMOL/L (ref 134–144)
T4 FREE SERPL-MCNC: 1.13 NG/DL (ref 0.82–1.77)
TIBC SERPL-MCNC: 390 UG/DL (ref 250–450)
TSH SERPL DL<=0.005 MIU/L-ACNC: 2.55 UIU/ML (ref 0.45–4.5)
UIBC SERPL-MCNC: 340 UG/DL (ref 131–425)
VIT B12 SERPL-MCNC: 240 PG/ML (ref 232–1245)
WBC # BLD AUTO: 8.6 X10E3/UL (ref 3.4–10.8)

## 2022-01-17 ENCOUNTER — OFFICE VISIT (OUTPATIENT)
Dept: OBSTETRICS AND GYNECOLOGY | Age: 32
End: 2022-01-17

## 2022-01-17 VITALS
WEIGHT: 129 LBS | SYSTOLIC BLOOD PRESSURE: 104 MMHG | DIASTOLIC BLOOD PRESSURE: 62 MMHG | HEIGHT: 66 IN | BODY MASS INDEX: 20.73 KG/M2

## 2022-01-17 DIAGNOSIS — Z20.2 EXPOSURE TO STD: ICD-10-CM

## 2022-01-17 DIAGNOSIS — Z01.419 ENCOUNTER FOR GYNECOLOGICAL EXAMINATION: Primary | ICD-10-CM

## 2022-01-17 DIAGNOSIS — Z80.41 FAMILY HISTORY OF OVARIAN CANCER: ICD-10-CM

## 2022-01-17 PROCEDURE — 99395 PREV VISIT EST AGE 18-39: CPT | Performed by: OBSTETRICS & GYNECOLOGY

## 2022-01-17 NOTE — PROGRESS NOTES
".  Subjective     Chief Complaint   Patient presents with   • Gynecologic Exam     last pap 21(-) no complaints today but pt does desire STD screening        History of Present Illness    Bogdan Garcia is a 31 y.o.  who presents for annual exam.  Her menses are regular every 28-30 days, lasting 4-7 days, dysmenorrhea none   Her son is 10 yo and doing well. He loves basketball. 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  She is not currently sexually active but desires STD screening. She declines blood screening.   Obstetric History:  OB History        1    Para   0    Term   0            AB        Living   1       SAB        IAB        Ectopic        Molar        Multiple        Live Births   1               Menstrual History:     Patient's last menstrual period was 2022 (exact date).         Current contraception: abstinence  History of abnormal Pap smear: no  Received Gardasil immunization: pt unsure  Perform regular self breast exam: yes - reg  Family history of uterine or ovarian cancer: yes - mat GM with ovarian cancer  Family History of colon cancer: no  Family history of breast cancer: no    Mammogram: not indicated.  Colonoscopy: not indicated.  DEXA: not indicated.    Exercise: moderately active  Calcium/Vitamin D: adequate intake    The following portions of the patient's history were reviewed and updated as appropriate: allergies, current medications, past family history, past medical history, past social history, past surgical history and problem list.    Review of Systems    Review of Systems   Constitutional: Negative for fatigue.   Respiratory: Negative for shortness of breath.    Gastrointestinal: Negative for abdominal pain.   Genitourinary: Negative for abnormal bleeding or pelvic pain  Neurological: Negative for headaches.   Psychiatric/Behavioral: Negative for dysphoric mood.         Objective   Physical Exam    /62   Ht 167.6 cm (66\")   " Wt 58.5 kg (129 lb)   LMP 01/09/2022 (Exact Date)   Breastfeeding No   BMI 20.82 kg/m²   General:   Alert, in no distress   Heart: regular rate and rhythm   Lungs: clear to auscultation bilaterally   Breast: Inspection with pinpoint white spot on Mclean gland of left nipple, no associated tenderness or erythema noted. No masses, retractions, nipple discharge or axillary adenopathy noted in either breast   Neck: Supple, no thyromegaly   Abdomen: Soft, no tenderness or guarding   Pelvis: External genitalia: normal general appearance  Urinary system: urethral meatus normal  Vaginal: normal mucosa without prolapse or lesions  Cervix: normal appearance  Adnexa: no masses or tenderness  Uterus: normal, nontender   Extremities: Normal without edema   Neurologic: Alert and oriented   Psychiatric: Normal affect, judgment and mood     Assessment/Plan   Diagnoses and all orders for this visit:    1. Encounter for gynecological examination (Primary)    2. Exposure to STD  -     Chlamydia trachomatis, Neisseria gonorrhoeae, Trichomonas vaginalis, PCR - Swab, Vagina    3. Family history of ovarian cancer        All questions answered.  Breast self exam technique reviewed and patient encouraged to perform self-exam monthly.  Discussed healthy lifestyle modifications.  Recommended 30 minutes of aerobic exercise five times per week.  Pap deferred as up to date and pt agrees. Advised her to call if she does not receive results of STD screen within the week  She declines rx for contraception    Discussed referral to surgeon regarding blocked Mclean gland for excision. She reports the Mclean gland of left nipple has intermittently appeared blocked on/off since she  10 years ago and the appearance is unchanged. She denies pain/swelling. She declines consult at this time. She was counseled to call with any pain/swelling or change in appearance.     Recommended genetic testing due to family hx and provided [facility]  handout. She will consider but prefers for her mother to test initially.

## 2022-01-19 LAB
C TRACH RRNA SPEC QL NAA+PROBE: NEGATIVE
N GONORRHOEA RRNA SPEC QL NAA+PROBE: NEGATIVE
T VAGINALIS DNA SPEC QL NAA+PROBE: NEGATIVE

## 2022-02-05 ENCOUNTER — DOCUMENTATION (OUTPATIENT)
Dept: SPORTS MEDICINE | Facility: CLINIC | Age: 32
End: 2022-02-05

## 2022-02-05 RX ORDER — PREDNISONE 20 MG/1
TABLET ORAL
Qty: 18 TABLET | Refills: 0 | Status: SHIPPED | OUTPATIENT
Start: 2022-02-05 | End: 2022-08-21

## 2022-02-10 ENCOUNTER — TELEPHONE (OUTPATIENT)
Dept: ORTHOPEDIC SURGERY | Facility: CLINIC | Age: 32
End: 2022-02-10

## 2022-02-10 NOTE — TELEPHONE ENCOUNTER
Caller: MARIE CONRAD     Relationship: SELF     Best call back number: [phone]    What is the best time to reach you: ANY     Who are you requesting to speak with (clinical staff, provider,  specific staff member):PROVIDER     Do you know the name of the person who called: YES     What was the call regarding: PATIENT STATE THAT SHE WANTED TO KNOW IF  WOULD WANT A MRI OF THE SPINE AND NECK TOO CONSIDERING WHAT IS GOING ON WITH THE PATIENT- OR IF HE WOULD LIKE TO SEE HER AGAIN-

## 2022-02-11 NOTE — TELEPHONE ENCOUNTER
I would be glad to order MRI of the neck and lumbar spine however her insurance is going to require 6 weeks of PT before they will approve it

## 2022-02-17 ENCOUNTER — HOSPITAL ENCOUNTER (OUTPATIENT)
Dept: MRI IMAGING | Facility: HOSPITAL | Age: 32
Discharge: HOME | End: 2022-02-17

## 2022-02-17 ENCOUNTER — HOSPITAL ENCOUNTER (OUTPATIENT)
Dept: CARDIOLOGY | Facility: HOSPITAL | Age: 32
Discharge: HOME | End: 2022-02-17

## 2022-02-17 DIAGNOSIS — G90.9 DISORDER OF AUTONOMIC NERVOUS SYSTEM: Chronic | ICD-10-CM

## 2022-02-17 DIAGNOSIS — R20.9 COLD EXTREMITIES: ICD-10-CM

## 2022-02-17 DIAGNOSIS — R90.89 ABNORMAL FINDING ON MRI OF BRAIN: ICD-10-CM

## 2022-02-17 DIAGNOSIS — R20.0 NUMBNESS IN BOTH HANDS: ICD-10-CM

## 2022-02-17 DIAGNOSIS — R42 DIZZINESS: ICD-10-CM

## 2022-02-17 DIAGNOSIS — G43.009 ATYPICAL MIGRAINE: Chronic | ICD-10-CM

## 2022-02-17 DIAGNOSIS — R20.0 NUMBNESS IN BOTH HANDS: Chronic | ICD-10-CM

## 2022-02-17 LAB
BH CV UPPER ARTERIAL LEFT 2ND DIGIT SYS MAX: 112 MMHG
BH CV UPPER ARTERIAL LEFT FBI 2ND DIGIT RATIO: 0.96
BH CV UPPER ARTERIAL LEFT WBI RATIO: 1.02
BH CV UPPER ARTERIAL RIGHT 2ND DIGIT SYS MAX: 117 MMHG
BH CV UPPER ARTERIAL RIGHT FBI 2ND DIGIT RATIO: 1.03
BH CV UPPER ARTERIAL RIGHT WBI RATIO: 1.09
MAXIMAL PREDICTED HEART RATE: 189 BPM
STRESS TARGET HR: 161 BPM
UPPER ARTERIAL LEFT ARM BRACHIAL SYS MAX: 111 MMHG
UPPER ARTERIAL LEFT ARM RADIAL SYS MAX: 116 MMHG
UPPER ARTERIAL LEFT ARM ULNAR SYS MAX: 114 MMHG
UPPER ARTERIAL RIGHT ARM BRACHIAL SYS MAX: 114 MMHG
UPPER ARTERIAL RIGHT ARM RADIAL SYS MAX: 124 MMHG
UPPER ARTERIAL RIGHT ARM ULNAR SYS MAX: 119 MMHG

## 2022-02-17 PROCEDURE — 70551 MRI BRAIN STEM W/O DYE: CPT

## 2022-02-17 PROCEDURE — 93922 UPR/L XTREMITY ART 2 LEVELS: CPT

## 2022-02-23 DIAGNOSIS — E23.7 PITUITARY ABNORMALITY: ICD-10-CM

## 2022-02-23 DIAGNOSIS — R93.0 ABNORMAL MRI OF HEAD: Primary | ICD-10-CM

## 2022-02-24 ENCOUNTER — TELEPHONE (OUTPATIENT)
Dept: SPORTS MEDICINE | Facility: CLINIC | Age: 32
End: 2022-02-24

## 2022-02-24 NOTE — TELEPHONE ENCOUNTER
UNABLE TO WARM TRANSFER    Caller: MARIE CONRAD     Relationship to patient: PATIENT    Best call back number:[phone]    Patient is needing: WONDERING IF SHE CAN COME IN FOR LABS AFTER 12:30 02/25/22  PLEASE CALL ASAP DUE TO FASTING         Neuro

## 2022-02-28 DIAGNOSIS — G90.9 DISORDER OF AUTONOMIC NERVOUS SYSTEM: ICD-10-CM

## 2022-02-28 DIAGNOSIS — N30.00 ACUTE CYSTITIS WITHOUT HEMATURIA: ICD-10-CM

## 2022-02-28 DIAGNOSIS — G43.009 ATYPICAL MIGRAINE: ICD-10-CM

## 2022-02-28 DIAGNOSIS — F41.0 PANIC DISORDER: ICD-10-CM

## 2022-03-01 RX ORDER — FLUDROCORTISONE ACETATE 0.1 MG/1
TABLET ORAL
Qty: 135 TABLET | Refills: 3 | Status: SHIPPED | OUTPATIENT
Start: 2022-03-01 | End: 2023-03-17

## 2022-03-02 DIAGNOSIS — R93.0 ABNORMAL MRI OF HEAD: Primary | ICD-10-CM

## 2022-03-02 DIAGNOSIS — E23.7 PITUITARY ABNORMALITY: ICD-10-CM

## 2022-03-16 ENCOUNTER — APPOINTMENT (OUTPATIENT)
Dept: MRI IMAGING | Facility: HOSPITAL | Age: 32
End: 2022-03-16

## 2022-03-17 ENCOUNTER — HOSPITAL ENCOUNTER (OUTPATIENT)
Dept: MRI IMAGING | Facility: HOSPITAL | Age: 32
Discharge: HOME | End: 2022-03-17
Admitting: FAMILY MEDICINE

## 2022-03-17 DIAGNOSIS — E23.7 PITUITARY ABNORMALITY: ICD-10-CM

## 2022-03-17 DIAGNOSIS — R93.0 ABNORMAL MRI OF HEAD: ICD-10-CM

## 2022-03-17 PROCEDURE — 0 GADOBENATE DIMEGLUMINE 529 MG/ML SOLUTION: Performed by: FAMILY MEDICINE

## 2022-03-17 PROCEDURE — A9577 INJ MULTIHANCE: HCPCS | Performed by: FAMILY MEDICINE

## 2022-03-17 PROCEDURE — 70553 MRI BRAIN STEM W/O & W/DYE: CPT

## 2022-03-17 RX ADMIN — GADOBENATE DIMEGLUMINE 12 ML: 529 INJECTION, SOLUTION INTRAVENOUS at 21:26

## 2022-04-26 ENCOUNTER — LAB (OUTPATIENT)
Dept: LAB | Facility: HOSPITAL | Age: 32
End: 2022-04-26

## 2022-04-26 ENCOUNTER — OFFICE VISIT (OUTPATIENT)
Dept: SPORTS MEDICINE | Facility: CLINIC | Age: 32
End: 2022-04-26

## 2022-04-26 VITALS
SYSTOLIC BLOOD PRESSURE: 98 MMHG | DIASTOLIC BLOOD PRESSURE: 60 MMHG | BODY MASS INDEX: 20.41 KG/M2 | WEIGHT: 127 LBS | TEMPERATURE: 98.2 F | HEART RATE: 68 BPM | HEIGHT: 66 IN | OXYGEN SATURATION: 98 %

## 2022-04-26 DIAGNOSIS — R20.2 NUMBNESS AND TINGLING: ICD-10-CM

## 2022-04-26 DIAGNOSIS — Z11.59 NEED FOR HEPATITIS C SCREENING TEST: ICD-10-CM

## 2022-04-26 DIAGNOSIS — R20.0 NUMBNESS AND TINGLING: ICD-10-CM

## 2022-04-26 DIAGNOSIS — Z00.00 PREVENTATIVE HEALTH CARE: Primary | ICD-10-CM

## 2022-04-26 LAB
ALBUMIN SERPL-MCNC: 4.7 G/DL (ref 3.5–5.2)
ALBUMIN/GLOB SERPL: 2.1 G/DL
ALP SERPL-CCNC: 63 U/L (ref 39–117)
ALT SERPL W P-5'-P-CCNC: 9 U/L (ref 1–33)
ANION GAP SERPL CALCULATED.3IONS-SCNC: 8.3 MMOL/L (ref 5–15)
AST SERPL-CCNC: 18 U/L (ref 1–32)
BASOPHILS # BLD AUTO: 0.05 10*3/MM3 (ref 0–0.2)
BASOPHILS NFR BLD AUTO: 0.7 % (ref 0–1.5)
BILIRUB SERPL-MCNC: 0.4 MG/DL (ref 0–1.2)
BUN SERPL-MCNC: 13 MG/DL (ref 6–20)
BUN/CREAT SERPL: 13.5 (ref 7–25)
CALCIUM SPEC-SCNC: 9.4 MG/DL (ref 8.6–10.5)
CHLORIDE SERPL-SCNC: 103 MMOL/L (ref 98–107)
CHOLEST SERPL-MCNC: 147 MG/DL (ref 0–200)
CO2 SERPL-SCNC: 26.7 MMOL/L (ref 22–29)
CREAT SERPL-MCNC: 0.96 MG/DL (ref 0.57–1)
DEPRECATED RDW RBC AUTO: 39.7 FL (ref 37–54)
EGFRCR SERPLBLD CKD-EPI 2021: 81.3 ML/MIN/1.73
EOSINOPHIL # BLD AUTO: 0.09 10*3/MM3 (ref 0–0.4)
EOSINOPHIL NFR BLD AUTO: 1.3 % (ref 0.3–6.2)
ERYTHROCYTE [DISTWIDTH] IN BLOOD BY AUTOMATED COUNT: 12 % (ref 12.3–15.4)
GLOBULIN UR ELPH-MCNC: 2.2 GM/DL
GLUCOSE SERPL-MCNC: 89 MG/DL (ref 65–99)
HCT VFR BLD AUTO: 41.9 % (ref 34–46.6)
HCV AB SER DONR QL: NORMAL
HDLC SERPL QL: 2.77
HDLC SERPL-MCNC: 53 MG/DL (ref 40–60)
HGB BLD-MCNC: 14.2 G/DL (ref 12–15.9)
IMM GRANULOCYTES # BLD AUTO: 0.02 10*3/MM3 (ref 0–0.05)
IMM GRANULOCYTES NFR BLD AUTO: 0.3 % (ref 0–0.5)
LDLC SERPL CALC-MCNC: 82 MG/DL (ref 0–100)
LYMPHOCYTES # BLD AUTO: 1.88 10*3/MM3 (ref 0.7–3.1)
LYMPHOCYTES NFR BLD AUTO: 26.6 % (ref 19.6–45.3)
MCH RBC QN AUTO: 30.6 PG (ref 26.6–33)
MCHC RBC AUTO-ENTMCNC: 33.9 G/DL (ref 31.5–35.7)
MCV RBC AUTO: 90.3 FL (ref 79–97)
MONOCYTES # BLD AUTO: 0.51 10*3/MM3 (ref 0.1–0.9)
MONOCYTES NFR BLD AUTO: 7.2 % (ref 5–12)
NEUTROPHILS NFR BLD AUTO: 4.52 10*3/MM3 (ref 1.7–7)
NEUTROPHILS NFR BLD AUTO: 63.9 % (ref 42.7–76)
NRBC BLD AUTO-RTO: 0 /100 WBC (ref 0–0.2)
PLATELET # BLD AUTO: 217 10*3/MM3 (ref 140–450)
PMV BLD AUTO: 10 FL (ref 6–12)
POTASSIUM SERPL-SCNC: 4.6 MMOL/L (ref 3.5–5.2)
PROT SERPL-MCNC: 6.9 G/DL (ref 6–8.5)
RBC # BLD AUTO: 4.64 10*6/MM3 (ref 3.77–5.28)
SODIUM SERPL-SCNC: 138 MMOL/L (ref 136–145)
TRIGL SERPL-MCNC: 58 MG/DL (ref 0–150)
VLDLC SERPL-MCNC: 12 MG/DL (ref 5–40)
WBC NRBC COR # BLD: 7.07 10*3/MM3 (ref 3.4–10.8)

## 2022-04-26 PROCEDURE — 99395 PREV VISIT EST AGE 18-39: CPT | Performed by: FAMILY MEDICINE

## 2022-04-26 PROCEDURE — 80053 COMPREHEN METABOLIC PANEL: CPT | Performed by: FAMILY MEDICINE

## 2022-04-26 PROCEDURE — 36415 COLL VENOUS BLD VENIPUNCTURE: CPT | Performed by: FAMILY MEDICINE

## 2022-04-26 PROCEDURE — 85025 COMPLETE CBC W/AUTO DIFF WBC: CPT | Performed by: FAMILY MEDICINE

## 2022-04-26 PROCEDURE — 80061 LIPID PANEL: CPT | Performed by: FAMILY MEDICINE

## 2022-04-26 PROCEDURE — 86803 HEPATITIS C AB TEST: CPT | Performed by: FAMILY MEDICINE

## 2022-04-26 NOTE — PROGRESS NOTES
"Bogdan Garcia is here today for an annual physical exam.     Continuing to have relapses of R arm and torso numbness and weakness. This has been going on for several years. Gets better with PT exercises     PHQ-2 Depression Screening  Little interest or pleasure in doing things?  0   Feeling down, depressed, or hopeless?  0   PHQ-2 Total Score  0         I have reviewed the patient's medical, family, and social history in detail and updated the computerized patient record.    Screening history:  Colonoscopy - n/a  Breast cancer, Pap/pelvic - per GYN  Metabolic - last year    Health Maintenance   Topic Date Due   • ANNUAL PHYSICAL  Never done   • TDAP/TD VACCINES (1 - Tdap) Never done   • HEPATITIS C SCREENING  Never done   • COVID-19 Vaccine (3 - Booster) 08/17/2021   • INFLUENZA VACCINE  08/01/2022   • Annual Gynecologic Pelvic and Breast Exam  01/18/2023   • PAP SMEAR  01/12/2024   • Pneumococcal Vaccine 0-64  Aged Out       Review of Systems    BP 98/60 (BP Location: Left arm, Patient Position: Sitting, Cuff Size: Adult)   Pulse 68   Temp 98.2 °F (36.8 °C) (Temporal)   Ht 167.6 cm (65.98\")   Wt 57.6 kg (127 lb)   SpO2 98%   BMI 20.51 kg/m²      Physical Exam    Vital signs reviewed.  General appearance: No acute distress  Eyes: conjunctiva clear without erythema; pupils equally round and reactive  ENT: external ears normal; hearing normal  Neck: supple; no thyromegaly  CV: normal rate and rhythm; no peripheral edema  Respiratory: normal respiratory effort; lungs clear to auscultation bilaterally  MSK: normal gait and station; no focal joint deformity or swelling  Skin: no rash or wounds; normal turgor  Neuro: cranial nerves 2-12 grossly intact; normal sensation to light touch  Psych: mood and affect normal; recent and remote memory intact    No visits with results within 2 Week(s) from this visit.   Latest known visit with results is:   Admission on 03/08/2022, Discharged on 03/08/2022   Component Date " Value Ref Range Status   • Rapid Strep A Screen 03/08/2022 Negative  Negative, VALID, INVALID, Not Performed Final   • Internal Control 03/08/2022 Passed  Passed Final   • Lot Number 03/08/2022 [id number]   Final   • Expiration Date 03/08/2022 05/31/2022   Final         Current Outpatient Medications:   •  fludrocortisone 0.1 MG tablet, TAKE 1 AND 1/2 TABLETS BY MOUTH DAILY, Disp: 135 tablet, Rfl: 3  •  Chlorcyclizine-Pseudoephed (Stahist AD) 25-60 MG tablet, One tablet three times a day as needed for congestion, Disp: 30 tablet, Rfl: 0  •  fluticasone (FLONASE) 50 MCG/ACT nasal spray, 2 sprays into the nostril(s) as directed by provider Daily., Disp: , Rfl:   •  promethazine-dextromethorphan (PROMETHAZINE-DM) 6.25-15 MG/5ML syrup, 5 ml PO Q 4-6 hours prn cough.  Max:  30 ml per 24 hours., Disp: 240 mL, Rfl: 0    Diagnoses and all orders for this visit:    1. Preventative health care (Primary)  -     CBC & Differential  -     Comprehensive Metabolic Panel  -     Lipid Panel With / Chol / HDL Ratio    2. Numbness and tingling    3. Need for hepatitis C screening test  -     Hepatitis C Antibody      Await NCV, if inconclusive then consider refer to neurologist       Encourage healthy diet and exercise.  Encourage patient to stay up to date on screening examinations as indicated based on age and risk factors.    EMR Dragon/Transcription disclaimer:    Much of this encounter note is an electronic transcription/translation of spoken language to printed text.  The electronic translation of spoken language may permit erroneous, or at times, nonsensical words or phrases to be inadvertently transcribed.  Although I have reviewed the note for such errors some may still exist.

## 2022-04-27 NOTE — PROGRESS NOTES
Called patient via phone and verbally relayed results and recommendations per Dr. Scott. All information was verbally understood by the patient.     Thank you  Kylie

## 2022-06-09 ENCOUNTER — TELEPHONE (OUTPATIENT)
Dept: SPORTS MEDICINE | Facility: CLINIC | Age: 32
End: 2022-06-09

## 2022-06-09 NOTE — TELEPHONE ENCOUNTER
Patient called into the office today with complaints and questions in regards to her right arm nerve damage and pain, states she had a flare up yesterday and is now not able to use her right arm and hand, has increased tingling, wanted to know if there is something that can be recommended for her to do in the meantime before getting NCS results, she is going to her first PT visit today, wants something prescribed for her nerve pain. I informed since Dr. Scott is retired, if she wants to make a f/u eval with one of the other providers she can. She just wanted to talk with someone over the phone, I advised I cannot give her recommendations but if she wants to speak with one of the other providers she can schedule a telephone visit. She opted for this, call was transferred to [facility] for scheduling.     Thanks  Kylie

## 2022-06-10 ENCOUNTER — OFFICE VISIT (OUTPATIENT)
Dept: SPORTS MEDICINE | Facility: CLINIC | Age: 32
End: 2022-06-10

## 2022-06-10 ENCOUNTER — HOSPITAL ENCOUNTER (OUTPATIENT)
Dept: INFUSION THERAPY | Facility: HOSPITAL | Age: 32
Discharge: HOME | End: 2022-06-10
Admitting: PSYCHIATRY & NEUROLOGY

## 2022-06-10 DIAGNOSIS — R20.9 COLD EXTREMITIES: Chronic | ICD-10-CM

## 2022-06-10 DIAGNOSIS — M54.12 CERVICAL RADICULOPATHY: Primary | ICD-10-CM

## 2022-06-10 PROCEDURE — 95911 NRV CNDJ TEST 9-10 STUDIES: CPT

## 2022-06-10 PROCEDURE — 95911 NRV CNDJ TEST 9-10 STUDIES: CPT | Performed by: PSYCHIATRY & NEUROLOGY

## 2022-06-10 PROCEDURE — 95886 MUSC TEST DONE W/N TEST COMP: CPT | Performed by: PSYCHIATRY & NEUROLOGY

## 2022-06-10 PROCEDURE — 95886 MUSC TEST DONE W/N TEST COMP: CPT

## 2022-06-10 PROCEDURE — 99442 PR PHYS/QHP TELEPHONE EVALUATION 11-20 MIN: CPT | Performed by: FAMILY MEDICINE

## 2022-06-10 RX ORDER — CELECOXIB 100 MG/1
100 CAPSULE ORAL 2 TIMES DAILY PRN
Qty: 60 CAPSULE | Refills: 0 | Status: SHIPPED | OUTPATIENT
Start: 2022-06-10 | End: 2022-08-21

## 2022-06-10 NOTE — PROCEDURES
EMG and Nerve Conduction Studies    I.      Instrument used: Neuromax 1002  II.     Please see data sheets for tabular summary of NCS and details on methods, temperatures and lab standards.   III.    EMG muscles tested for upper extremity studies include the deltoid, biceps, triceps, pronator teres, extensor digitorum communis, first dorsal interosseous and abductor pollicis brevis.    IV.   EMG muscles tested for lower extremity studies include the vastus lateralis, tibialis anterior, peroneus longus, medial gastrocnemius and extensor digitorum brevis.    V.    Additional muscles tested as needed.  Paraspinal muscles tested as needed.   VI.   Please see data sheets for tabular summary of EMG findings.   VII. The complete report includes the data sheets.      Indication: Right medial shoulder blade pain radiating down the arm to the hand with numbness and tingling of the entire arm  History: 32-year-old white female with 2-month history of right medial shoulder blade pain radiating down the arm to the hand with numbness and tingling in the entire arm and hand.  The the numbness and tingling in the last 2 digits has improved but she still has it in the first 3.  No diabetes or thyroid disease      Ht: 167.6 cm  Wt: 57.6 kg  HbA1C:   Lab Results   Component Value Date    HGBA1C 5.3 12/28/2021     TSH:   Lab Results   Component Value Date    TSH 1.880 02/25/2022       Technical summary:  Extensive nerve conduction studies were obtained in the right arm with 1 comparison on the left.  Brachial plexus evaluation was included.  Skin temperatures were quite cold initially but were at least 32 °C after warming the hands.  Needle examination was obtained on selected muscles of the right arm with additional shoulder girdle muscles studied.    Results:  1.  Normal right median antidromic sensory distal latency and amplitude.  2.  Median orthodromic palmar sensory distal latencies and amplitudes bilaterally.  3.  Normal right  ulnar antidromic sensory distal latency and amplitude.  4.  Normal right radial sensory distal latency and amplitude.  5.  Normal right lateral and medial antebrachial cutaneous amplitudes.  6.  Normal right median motor distal latency, conduction velocity, amplitudes and F-wave.  7.  Normal right ulnar motor distal latency, conduction velocities, amplitudes and F-wave.  8.  Needle examination of selected muscles in the right arm including additional shoulder girdle muscles showed normal insertional activities throughout.  There were normal motor units and recruitment patterns throughout.  Cervical paraspinals at C6 showed no abnormality.    Impression:  Normal study.  No evidence of a right median or ulnar neuropathy, brachial plexopathy or cervical radiculopathy by this study.  In addition no evidence of a left median neuropathy.  Study results were discussed with the patient.    Samuel Mackay M.D.      Addendum:  Screening for a left median neuropathy was performed only.  Extensive evaluation of the left arm was not done since she describes no symptoms and the study on the right side did not need any further comparisons.  GNS        Dictated utilizing Dragon dictation.

## 2022-06-10 NOTE — PROGRESS NOTES
Telephone Visit Note    Chief Complaint   Patient presents with   • Cervical Spine - Pain         History of Present Illness  Returned to gym which helped. Went to PT. Helped. Pain recurred a few d ago. Had dry needling, cupping today. emg today. Pain intensified.  Has not had formal work-up in the office regarding the etiology of her neck pain, arm pain.      Diagnoses and all orders for this visit:    Cervical radiculopathy  -     celecoxib (CeleBREX) 100 MG capsule; Take 1 capsule by mouth 2 (Two) Times a Day As Needed for Mild Pain .      Discussed confounding, limited nature of telehealth as it pertains to the nature of her call.  She does sound like she is having cervical radiculopathy though she needs formal evaluation in office.  She is hesitant to complete Medrol Dosepak.  Agreeable for Celebrex.  Follow-up in office for formal evaluation, x-ray and likely MRI.    This patient was evaluated by telephone due to current precautions with COVID-19.  Consent to telephone visit for this problem was given by the patient. I spent a total of 12 minutes on the phone with the patient.

## 2022-06-14 ENCOUNTER — OFFICE VISIT (OUTPATIENT)
Dept: SPORTS MEDICINE | Facility: CLINIC | Age: 32
End: 2022-06-14

## 2022-06-14 VITALS
BODY MASS INDEX: 20.41 KG/M2 | SYSTOLIC BLOOD PRESSURE: 100 MMHG | OXYGEN SATURATION: 98 % | HEART RATE: 76 BPM | HEIGHT: 66 IN | DIASTOLIC BLOOD PRESSURE: 60 MMHG | WEIGHT: 127 LBS | TEMPERATURE: 98 F | RESPIRATION RATE: 16 BRPM

## 2022-06-14 DIAGNOSIS — M54.12 CERVICAL RADICULOPATHY: Primary | ICD-10-CM

## 2022-06-14 DIAGNOSIS — R20.0 NUMBNESS IN BOTH HANDS: ICD-10-CM

## 2022-06-14 PROCEDURE — 72040 X-RAY EXAM NECK SPINE 2-3 VW: CPT | Performed by: FAMILY MEDICINE

## 2022-06-14 PROCEDURE — 99214 OFFICE O/P EST MOD 30 MIN: CPT | Performed by: FAMILY MEDICINE

## 2022-06-14 RX ORDER — GABAPENTIN 300 MG/1
300 CAPSULE ORAL NIGHTLY PRN
Qty: 30 CAPSULE | Refills: 0 | Status: SHIPPED | OUTPATIENT
Start: 2022-06-14 | End: 2022-08-21

## 2022-06-14 NOTE — PROGRESS NOTES
"Bogdan is a 32 y.o. year old female presents to [facility]    Chief Complaint   Patient presents with   • Neck Pain     New eval for neck pain cervical radiculopathy - has numbness and tingling in b/l upper extremities - has tried PT with some relief - has tried dry needling and cupping - EMG/NCS on 06/10/2022 - here with new x-rays for further evaluation and treatment        History of Present Illness  Ms. Garcia is a 32-year-old female presenting for follow up on RUE numbness/tingling and right cervical spine pain. Tingling and numbness along the right thumb and 2nd digit. She does report occasional weakness and dropping items being held in the right hand. This numbness, and occasional pressure is present along the right cervical side and down the RUE. Pressure pain along the cervical area is exacerbated by driving and heat.  There is a heaviness in the right arm at times. She states the numbness in the right hand can be 2 fingers one day and 4 fingers the next. All these symptoms appear unprovoked. Symptoms begin upon waking in the mornings. Palliative factors: ice and gentle cooling.     I have reviewed the patient's medical, family, and social history in detail and updated the computerized patient record.    /60 (BP Location: Right arm, Patient Position: Sitting, Cuff Size: Adult)   Pulse 76   Temp 98 °F (36.7 °C) (Temporal)   Resp 16   Ht 167.6 cm (65.98\")   Wt 57.6 kg (127 lb)   SpO2 98%   Breastfeeding No   BMI 20.51 kg/m²      Physical Exam    Mask worn thru encounter  Vital signs reviewed.   General: No acute distress.  Eyes: conjunctiva clear; pupils equally round and reactive  ENT: external ears atraumatic  CV: no peripheral edema  Resp: normal respiratory effort, no use of accessory muscles  Skin: no rashes or wounds; normal turgor  Psych: mood and affect appropriate; recent and remote memory intact  Neuro: sensation to light touch intact on left upper " extremity; Decreased on right upper extremity in C5-C6 dermatome    MSK Exam    DTR 2+ C5-C6 bilaterally    Cervical:  Left spurling negative  Right Spurling equivocal   4/5 shoulder abduction - RIGHT    Negative elevated arm squeeze test    Right: Negative tinnel at cubital tunnel and wrist           Cervical Spine X-Ray    Indication: Pain  Views: AP and Lateral    Findings:  No fracture  No bony lesions  Soft tissues normal  Normal disc spaces    No prior studies are available for comparison.      Diagnoses and all orders for this visit:    Cervical radiculopathy  -     XR Spine Cervical 2 or 3 View    Numbness in both hands  -     XR Spine Cervical 2 or 3 View      Plan: has failed PT, greater than 6 wks. Failed oral NSAIDs. MRI of cervical spine; strength training exercises of the upper extremity; gabapentin qhs PRN. Other ddx incl TOS.        Follow Up   No follow-ups on file.  Patient was given instructions and counseling regarding her condition or for health maintenance advice. Please see specific information pulled into the AVS if appropriate.     EMR Dragon/Transcription disclaimer:    Much of this encounter note is an electronic transcription/translation of spoken language to printed text.  The electronic translation of spoken language may permit erroneous, or at times, nonsensical words or phrases to be inadvertently transcribed.  Although I have reviewed the note for such errors some may still exist.

## 2022-06-28 ENCOUNTER — APPOINTMENT (OUTPATIENT)
Dept: MRI IMAGING | Facility: HOSPITAL | Age: 32
End: 2022-06-28

## 2022-07-12 ENCOUNTER — HOSPITAL ENCOUNTER (OUTPATIENT)
Dept: MRI IMAGING | Facility: HOSPITAL | Age: 32
Discharge: HOME | End: 2022-07-12

## 2022-07-13 ENCOUNTER — HOSPITAL ENCOUNTER (OUTPATIENT)
Dept: MRI IMAGING | Facility: HOSPITAL | Age: 32
Discharge: HOME | End: 2022-07-13
Admitting: FAMILY MEDICINE

## 2022-07-13 DIAGNOSIS — R20.0 NUMBNESS IN BOTH HANDS: ICD-10-CM

## 2022-07-13 DIAGNOSIS — M54.12 CERVICAL RADICULOPATHY: ICD-10-CM

## 2022-07-13 PROCEDURE — 72141 MRI NECK SPINE W/O DYE: CPT

## 2022-07-18 ENCOUNTER — TELEPHONE (OUTPATIENT)
Dept: SPORTS MEDICINE | Facility: CLINIC | Age: 32
End: 2022-07-18

## 2022-07-18 DIAGNOSIS — M54.12 CERVICAL RADICULOPATHY: ICD-10-CM

## 2022-07-18 DIAGNOSIS — R20.0 NUMBNESS IN BOTH HANDS: ICD-10-CM

## 2022-07-18 DIAGNOSIS — M50.20 HERNIATION OF INTERVERTEBRAL DISC OF CERVICAL REGION: Primary | ICD-10-CM

## 2022-07-18 NOTE — TELEPHONE ENCOUNTER
Called and spoke with patient, relayed information provided, she is already currently in formal PT, agreeable to start pain management.   Referral placed, informed she will be contacted to schedule initial consultation with Dr. Davidson.  All information was verbally understood    Thanks  Kylie

## 2022-07-18 NOTE — TELEPHONE ENCOUNTER
Patient called in wanting results for MRI of the C-spine, just resulted on 07/15/2022. Informed Dr. Ny was out of office, she requested if Dr. Scott can review and result with recommendations since she was seen with him previously. Informed I would send a message, and return a call to her when able.     Thank you,  Kylie

## 2022-07-18 NOTE — TELEPHONE ENCOUNTER
MRI does show a right-sided herniated disc.  We typically recommend physical therapy and referral to pain management.  Please place those referrals.  Follow-up here if no significant improvement with PT and pain management.

## 2022-07-20 DIAGNOSIS — M50.123 CERVICAL DISC DISORDER AT C6-C7 LEVEL WITH RADICULOPATHY: Primary | ICD-10-CM

## 2022-08-17 ENCOUNTER — OFFICE VISIT (OUTPATIENT)
Dept: PAIN MEDICINE | Facility: CLINIC | Age: 32
End: 2022-08-17

## 2022-08-17 VITALS
SYSTOLIC BLOOD PRESSURE: 112 MMHG | HEIGHT: 66 IN | WEIGHT: 127.6 LBS | DIASTOLIC BLOOD PRESSURE: 74 MMHG | TEMPERATURE: 96.2 F | HEART RATE: 61 BPM | OXYGEN SATURATION: 99 % | BODY MASS INDEX: 20.51 KG/M2 | RESPIRATION RATE: 12 BRPM

## 2022-08-17 DIAGNOSIS — M54.12 CERVICAL RADICULOPATHY AT C7: ICD-10-CM

## 2022-08-17 DIAGNOSIS — M50.20 CERVICAL DISC HERNIATION: Primary | ICD-10-CM

## 2022-08-17 PROCEDURE — 99214 OFFICE O/P EST MOD 30 MIN: CPT | Performed by: NURSE PRACTITIONER

## 2022-08-17 NOTE — PROGRESS NOTES
"CHIEF COMPLAINT  New patient ref by Jason Ny Jr., DO.M50.20 (ICD-10-CM) - Herniation of intervertebral disc of cervical region,R20.0 (ICD-10-CM) - Numbness in both hands,M54.12 (ICD-10-CM) - Cervical radiculopathy.    Subjective   Bogdan Garcia is a 32 y.o. female.   She presents to the office for evaluation of Neck pain. She was referred here by Dr. Ny.     Her pain started 6-8 months ago with spasms in her right neck radiating into her right arm. At that time she was treated with a steroid and PT which improved her pain.  Her pain then returned with increasing numbness in her neck and right arm.  She was again treated with PT and a steroid.      Today her pain is 3/10VAS in severity.  She describes her neck pain as intermittent right sided pain that is sharp with numbing pain that radiates into her right arm. She states that her arm will feel \"dead\".  Her pain is worsened by lying down/sleep position, ROM of her neck, any jarring movements (jumping jacks), and using her arms above her head; it is improved by PT exercises, cervical traction.     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     Dr. Ny prescribed her Gabapentin, she took this intermittently, didn't like how she felt the next day.     Current pain medications: None     Past therapies:  Physical Therapy: Yes, currently in PT, initial improvement  Chiropractor: None  Massage Therapy: Yes, not efficacious  Dry Needling: Yes, helpful  Cupping: Yes, helpful  Traction: Yes, helpful  TENS: None  Heat/Ice: Not efficacious  Neck or back surgery: None  Past pain management: None     Previous Injections: None    Neck Pain   This is a chronic problem. The current episode started more than 1 year ago. The problem occurs constantly. The pain is present in the right side. The quality of the pain is described as stabbing. The pain is at a severity of 3/10. Associated symptoms include numbness (RIGHT SIDE) and weakness (RIGHT SIDE, HANDS). " Pertinent negatives include no chest pain, fever or headaches.      PEG Assessment   What number best describes your pain on average in the past week?6  What number best describes how, during the past week, pain has interfered with your enjoyment of life?6  What number best describes how, during the past week, pain has interfered with your general activity?  6      Current Outpatient Medications:   •  fludrocortisone 0.1 MG tablet, TAKE 1 AND 1/2 TABLETS BY MOUTH DAILY, Disp: 135 tablet, Rfl: 3  •  albuterol sulfate  (90 Base) MCG/ACT inhaler, Inhale 2 puffs Every 4 (Four) Hours As Needed for Wheezing or Shortness of Air., Disp: 18 g, Rfl: 0  •  celecoxib (CeleBREX) 100 MG capsule, Take 1 capsule by mouth 2 (Two) Times a Day As Needed for Mild Pain ., Disp: 60 capsule, Rfl: 0  •  Chlorcyclizine-Pseudoephed (Stahist AD) 25-60 MG tablet, One tablet three times a day as needed for congestion, Disp: 30 tablet, Rfl: 0  •  fluticasone (FLONASE) 50 MCG/ACT nasal spray, 2 sprays into the nostril(s) as directed by provider Daily., Disp: , Rfl:   •  gabapentin (NEURONTIN) 300 MG capsule, Take 1 capsule by mouth At Night As Needed (qhs)., Disp: 30 capsule, Rfl: 0  •  methylPREDNISolone (MEDROL) 4 MG dose pack, Take as directed on package instructions., Disp: 21 tablet, Rfl: 0  •  predniSONE (DELTASONE) 20 MG tablet, Take 3 tabs daily for 3 days, then 2 daily for 3 days, then 1 daily for 3 days, Disp: 18 tablet, Rfl: 0  •  promethazine-dextromethorphan (PROMETHAZINE-DM) 6.25-15 MG/5ML syrup, 5 ml PO Q 4-6 hours prn cough.  Max:  30 ml per 24 hours., Disp: 240 mL, Rfl: 0    The following portions of the patient's history were reviewed and updated as appropriate: allergies, current medications, past family history, past medical history, past social history, past surgical history and problem list.    REVIEW OF PERTINENT MEDICAL DATA    Office visit from 6/14/2022 with Dr. Ny reviewed.  Patient seen in follow-up for  right upper extremity numbness/tingling and right cervical pain.  She reports occasional weakness and dropping items being held in her right hand.  She also reports heaviness in her right arm at times.  Patient has failed PT greater than 6 weeks, failed oral NSAIDs, will order MRI of cervical spine and plan for strength training exercises of the upper extremity.  Gabapentin nightly as needed.    MRI OF THE CERVICAL SPINE WITHOUT CONTRAST     CLINICAL HISTORY: Cervical radiculopathy. Right-sided pain with numbness  in fingers.     TECHNIQUE: MRI of the cervical spine is obtained with sagittal T1, T2,  and gradient echo images. Additionally, there are axial gradient echo  and oblique sagittal T2-weighted images through the cervical spine.     FINDINGS:     There is mild loss of the usual lordotic curvature of the cervical  spine. Otherwise, normal alignment is noted.     At C2-3, C3-4, C4-5, and C5-6 there is no significant canal or foraminal  stenosis.     At C6-7, there is a right central and medial foraminal disc extrusion  which is within the position of causing mass effect upon the exiting  right C7 nerve root. There is no significant degree of left foraminal  narrowing. Mild to moderate canal narrowing is seen along the right  aspect of the cervical spinal canal secondary to this disc extrusion.     At C7-T1, there is no significant degree of canal or foraminal stenosis.     The cervical spinal cord has normal signal intensity. The visualized  contents of the cranial vault are unremarkable. There are no  pathological areas of marrow replacement. No abnormal areas of  susceptibility artifact are noted.     IMPRESSION:     There is a right central/medial foraminal disc extrusion at C6-7 that  results in a mild-to-moderate degree of canal narrowing along the right  side of the cervical spinal canal and is within the position of exerting  mass effect upon the exiting right C7 nerve root. Please correlate for  a  right C7 radiculopathy. Otherwise, no other significant canal or  foraminal stenosis is seen throughout the cervical spine.     This report was finalized on 7/15/2022 7:10 AM by Dr. Bryant Shrestha M.D.    EMG/NCS 6/10/2022  Technical summary:   Extensive nerve conduction studies were obtained in the right arm with 1 comparison on the left.  Brachial plexus evaluation was included.  Skin temperatures were quite cold initially but were at least 32 °C after warming the hands.  Needle examination was obtained on selected muscles of the right arm with additional shoulder girdle muscles studied.     Results:  1.  Normal right median antidromic sensory distal latency and amplitude.  2.  Median orthodromic palmar sensory distal latencies and amplitudes bilaterally.  3.  Normal right ulnar antidromic sensory distal latency and amplitude.  4.  Normal right radial sensory distal latency and amplitude.  5.  Normal right lateral and medial antebrachial cutaneous amplitudes.  6.  Normal right median motor distal latency, conduction velocity, amplitudes and F-wave.  7.  Normal right ulnar motor distal latency, conduction velocities, amplitudes and F-wave.  8.  Needle examination of selected muscles in the right arm including additional shoulder girdle muscles showed normal insertional activities throughout.  There were normal motor units and recruitment patterns throughout.  Cervical paraspinals at C6 showed no abnormality.     Impression:  Normal study.  No evidence of a right median or ulnar neuropathy, brachial plexopathy or cervical radiculopathy by this study.  In addition no evidence of a left median neuropathy.  Study results were discussed with the patient.     Samuel Mackay M.D.    Review of Systems   Constitutional: Negative for activity change (LIMITED), fatigue and fever.   HENT: Negative for congestion.    Eyes: Negative for visual disturbance.   Respiratory: Negative for cough and chest tightness.    Cardiovascular:  "Negative for chest pain.   Gastrointestinal: Positive for constipation. Negative for abdominal pain and diarrhea.   Genitourinary: Negative for difficulty urinating and dysuria.   Musculoskeletal: Positive for neck pain.   Neurological: Positive for weakness (RIGHT SIDE, HANDS) and numbness (RIGHT SIDE). Negative for dizziness, light-headedness and headaches.   Psychiatric/Behavioral: Positive for sleep disturbance. Negative for agitation and suicidal ideas. The patient is not nervous/anxious.      Vitals:    08/17/22 1439   BP: 112/74   BP Location: Right arm   Patient Position: Sitting   Pulse: 61   Resp: 12   Temp: 96.2 °F (35.7 °C)   SpO2: 99%   Weight: 57.9 kg (127 lb 9.6 oz)   Height: 167.6 cm (66\")   PainSc:   3   PainLoc: Comment: neck         Objective   Physical Exam  Vitals and nursing note reviewed.   Constitutional:       Appearance: Normal appearance. She is well-developed.   Eyes:      General: Lids are normal.   Cardiovascular:      Rate and Rhythm: Normal rate.   Pulmonary:      Effort: Pulmonary effort is normal.   Musculoskeletal:      Cervical back: Tenderness present. Pain with movement present. Decreased range of motion.   Neurological:      Mental Status: She is alert and oriented to person, place, and time.   Psychiatric:         Attention and Perception: Attention normal.         Mood and Affect: Mood normal.         Speech: Speech normal.         Behavior: Behavior normal.         Judgment: Judgment normal.       Assessment & Plan   Diagnoses and all orders for this visit:    1. Cervical disc herniation (Primary)    2. Cervical radiculopathy at C7      Discussed with patient that she has utilized all other noninvasive interventions to treat her cervical disc herniation including PT, NSAIDs, oral steroids x2 rounds, gabapentin, dry needling, cupping, traction, and massage therapy.  At this point I recommend proceeding with a MELBA to treat her ongoing right neck and right arm symptoms.    --- " MELBA with NO IV SEDATION (7.5 years sober, requests no potentially addictive medication)  Reviewed the procedure at length with the patient.  Included in the review was expectations, complications, risk and benefits.The procedure was described in detail and the risks, benefits and alternatives were discussed with the patient (including but not limited to: bleeding, infection, nerve damage, worsening of pain, inability to perform injection, paralysis, seizures, coma, no pain relief and death) who agreed to proceed.  Discussed the potential for sedation if warranted/wanted.  The procedure will plan to be performed at [facility] with fluoroscopic guidance(unless ultrasound is indicated) and could potentially have steroids and contrast dye used. Questions were answered and in a way the patient could understand.  Patient verbalized understanding and wishes to proceed.  This intervention will be ordered.  Discussed with patient that all procedures are part of a multimodal plan of care and include either formal PT or a home exercise program.  Patient has no evidence of coagulopathy or current infection.    --- Will discuss with Dr. Davidson if he has any recommendations for NON-ADDICTIVE pre-procedure oral medication that may help with procedural anxiety.  --- Follow-up after procedure     CHITO REPORT    As the clinician, I personally reviewed the CHITO from 8/17/2022 while the patient was in the office today.    Dictated utilizing Dragon dictation.     This document is intended for medical expert use only. Reading of this document by patients and/or patient's family without participating medical staff guidance may result in misinterpretation and unintended morbidity.   Any interpretation of such data is the responsibility of the patient and/or family member responsible for the patient in concert with their primary or specialist providers, not to be left for sources of online searches such as [employer],  [employer] or similar queries. Relying on these approaches to knowledge may result in misinterpretation, misguided goals of care and even death should patients or family members try recommendations outside of the realm of professional medical care in a supervised way.

## 2022-08-18 ENCOUNTER — PREP FOR SURGERY (OUTPATIENT)
Dept: SURGERY | Facility: SURGERY CENTER | Age: 32
End: 2022-08-18

## 2022-08-18 ENCOUNTER — TRANSCRIBE ORDERS (OUTPATIENT)
Dept: SURGERY | Facility: SURGERY CENTER | Age: 32
End: 2022-08-18

## 2022-08-18 DIAGNOSIS — M54.12 CERVICAL RADICULOPATHY AT C7: Primary | ICD-10-CM

## 2022-08-18 RX ORDER — SODIUM CHLORIDE 0.9 % (FLUSH) 0.9 %
10 SYRINGE (ML) INJECTION EVERY 12 HOURS SCHEDULED
Status: CANCELLED | OUTPATIENT
Start: 2022-08-18

## 2022-08-18 RX ORDER — SODIUM CHLORIDE 0.9 % (FLUSH) 0.9 %
10 SYRINGE (ML) INJECTION AS NEEDED
Status: CANCELLED | OUTPATIENT
Start: 2022-08-18

## 2022-08-19 ENCOUNTER — TELEPHONE (OUTPATIENT)
Dept: PAIN MEDICINE | Facility: CLINIC | Age: 32
End: 2022-08-19

## 2022-08-19 RX ORDER — HYDROXYZINE 50 MG/1
100 TABLET, FILM COATED ORAL ONCE
Qty: 2 TABLET | Refills: 0 | Status: SHIPPED | OUTPATIENT
Start: 2022-08-19 | End: 2022-08-19

## 2022-08-19 NOTE — TELEPHONE ENCOUNTER
Please inform patient that I am sending in Hydroxyzine 100 mg to take approximately 30 minutes prior to her procedure to help with anxiety. I do recommend she have a  as this can cause drowsiness. Thanks

## 2022-09-16 RX ORDER — HYDROXYZINE 50 MG/1
TABLET, FILM COATED ORAL
COMMUNITY
Start: 2022-08-19

## 2022-09-20 ENCOUNTER — HOSPITAL ENCOUNTER (OUTPATIENT)
Facility: SURGERY CENTER | Age: 32
Setting detail: HOSPITAL OUTPATIENT SURGERY
Discharge: HOME | End: 2022-09-20
Attending: ANESTHESIOLOGY | Admitting: ANESTHESIOLOGY

## 2022-09-20 ENCOUNTER — HOSPITAL ENCOUNTER (OUTPATIENT)
Dept: GENERAL RADIOLOGY | Facility: SURGERY CENTER | Age: 32
Setting detail: HOSPITAL OUTPATIENT SURGERY
End: 2022-09-20

## 2022-09-20 VITALS
OXYGEN SATURATION: 100 % | RESPIRATION RATE: 16 BRPM | SYSTOLIC BLOOD PRESSURE: 102 MMHG | BODY MASS INDEX: 20.89 KG/M2 | HEART RATE: 57 BPM | DIASTOLIC BLOOD PRESSURE: 68 MMHG | WEIGHT: 130 LBS | TEMPERATURE: 98.2 F | HEIGHT: 66 IN

## 2022-09-20 DIAGNOSIS — M54.12 CERVICAL RADICULOPATHY AT C7: ICD-10-CM

## 2022-09-20 LAB
B-HCG UR QL: NEGATIVE
EXPIRATION DATE: NORMAL
INTERNAL NEGATIVE CONTROL: NEGATIVE
INTERNAL POSITIVE CONTROL: POSITIVE
Lab: NORMAL

## 2022-09-20 PROCEDURE — 81025 URINE PREGNANCY TEST: CPT | Performed by: ANESTHESIOLOGY

## 2022-09-20 PROCEDURE — 25010000002 METHYLPREDNISOLONE PER 80 MG: Performed by: ANESTHESIOLOGY

## 2022-09-20 PROCEDURE — 62321 NJX INTERLAMINAR CRV/THRC: CPT | Performed by: ANESTHESIOLOGY

## 2022-09-20 PROCEDURE — 25010000002 ONDANSETRON PER 1 MG: Performed by: ANESTHESIOLOGY

## 2022-09-20 PROCEDURE — 25010000002 IOPAMIDOL 61 % SOLUTION 30 ML VIAL: Performed by: ANESTHESIOLOGY

## 2022-09-20 PROCEDURE — S0260 H&P FOR SURGERY: HCPCS | Performed by: ANESTHESIOLOGY

## 2022-09-20 PROCEDURE — 77002 NEEDLE LOCALIZATION BY XRAY: CPT

## 2022-09-20 PROCEDURE — 76000 FLUOROSCOPY <1 HR PHYS/QHP: CPT

## 2022-09-20 RX ORDER — SODIUM CHLORIDE, SODIUM LACTATE, POTASSIUM CHLORIDE, CALCIUM CHLORIDE 600; 310; 30; 20 MG/100ML; MG/100ML; MG/100ML; MG/100ML
20 INJECTION, SOLUTION INTRAVENOUS CONTINUOUS
Status: DISCONTINUED | OUTPATIENT
Start: 2022-09-20 | End: 2022-09-20 | Stop reason: HOSPADM

## 2022-09-20 RX ORDER — ONDANSETRON 4 MG/1
4-8 TABLET, FILM COATED ORAL EVERY 8 HOURS PRN
Qty: 50 TABLET | Refills: 1 | OUTPATIENT
Start: 2022-09-20 | End: 2023-01-13

## 2022-09-20 RX ORDER — ONDANSETRON 2 MG/ML
INJECTION INTRAMUSCULAR; INTRAVENOUS AS NEEDED
Status: DISCONTINUED | OUTPATIENT
Start: 2022-09-20 | End: 2022-09-20 | Stop reason: HOSPADM

## 2022-09-20 RX ORDER — SODIUM CHLORIDE 0.9 % (FLUSH) 0.9 %
10 SYRINGE (ML) INJECTION AS NEEDED
Status: DISCONTINUED | OUTPATIENT
Start: 2022-09-20 | End: 2022-09-20 | Stop reason: HOSPADM

## 2022-09-20 RX ORDER — SODIUM CHLORIDE 0.9 % (FLUSH) 0.9 %
10 SYRINGE (ML) INJECTION EVERY 12 HOURS SCHEDULED
Status: DISCONTINUED | OUTPATIENT
Start: 2022-09-20 | End: 2022-09-20 | Stop reason: HOSPADM

## 2022-09-20 RX ORDER — ACETAMINOPHEN 500 MG
1000 TABLET ORAL ONCE
Status: COMPLETED | OUTPATIENT
Start: 2022-09-20 | End: 2022-09-20

## 2022-09-20 RX ADMIN — SODIUM CHLORIDE, SODIUM LACTATE, POTASSIUM CHLORIDE, AND CALCIUM CHLORIDE 20 ML/HR: .6; .31; .03; .02 INJECTION, SOLUTION INTRAVENOUS at 09:47

## 2022-09-20 RX ADMIN — ACETAMINOPHEN 1000 MG: 500 TABLET ORAL at 10:09

## 2022-09-20 NOTE — DISCHARGE INSTRUCTIONS
[facility] - Post-procedure Instructions          --  While there are no absolute restrictions, it is recommended that you do not perform strenuous activity today. In the morning, you may resume your level of activity as before your block.    --  If you have a band-aid at your injection site, please remove it later today. Observe the area for any redness, swelling, pus-like drainage, or a temperature over 101°. If any of these symptoms occur, please call your doctor at [phone]. If after office hours, leave a message and the on-call provider will return your call.    --  Ice may be applied to your injection site. It is recommended you avoid direct heat (heating pad; hot tub) for 1-2 days.    --  Call [facility] at [phone] if you experience persistent headache, persistent bleeding from the injection site, or severe pain not relieved by heat or oral medication.    --  Do not make important decisions today.    --  Due to the effects of the block and/or the I.V. Sedation, DO NOT drive or operate hazardous machinery for 12 hours.  Local anesthetics may cause numbness after procedure and precautions must be taken with regards to operating equipment as well as with walking, even if ambulating with assistance of another person or with an assistive device.    --  Do not drink alcohol for 12 hours.    -- You may return to work tomorrow, or as directed by your referring doctor.    --  Occasionally you may notice a slight increase in your pain after the procedure. This should start to improve within the next 24-48 hours. Radiofrequency ablation procedure pain may last 3-4 weeks.    --  It may take as long as 3-4 days before you notice a gradual improvement in your pain and/or other symptoms.    -- You may continue to take your prescribed pain medication as needed.    --  Some normal possible side effects of steroid use could include fluid retention, increased blood sugar, dull headache,  increased sweating, increased appetite, mood swings and flushing.    --  Diabetics are recommended to watch their blood glucose level closely for 24-48 hours after the injection.    --  Must stay in PACU for 20 min upon arrival and prove no leg weakness before being discharged.    --  IN THE EVENT OF A LIFE THREATENING EMERGENCY, (CHEST PAIN, BREATHING DIFFICULTIES, PARALYSIS…) YOU SHOULD GO TO YOUR NEAREST EMERGENCY ROOM.    --  You should be contacted by our office within 2-3 days to schedule follow up or next appointment date.  If not contacted within 7 days, please call the office at [phone]

## 2022-09-20 NOTE — OP NOTE
Cervical Epidural Steroid Injection @ C6-C7  [facility]    PREOPERATIVE DIAGNOSIS: Right Cervical Radiculopathy  POSTOPERATIVE DIAGNOSIS:  Same as preop diagnosis    PROCEDURE:   Cervical Epidural Steroid Injection, Therapeutic Translaminar Injection, with epidurogram, at  C6-C7 level    PRE-PROCEDURE DISCUSSION WITH PATIENT:    Risks and complications were discussed with the patient prior to starting the procedure and informed consent was obtained.  We discussed various topics including but not limited to bleeding, infection, injury, paralysis, nerve injury, dural puncture, coma, death, worsening of clinical picture, lack of pain relief, and postprocedural soreness.    SURGEON:  Bernabe Davidson MD    REASON FOR PROCEDURE:    Radiating pattern of pain is likely consistent with degenerative changes in the area. and Radicular pain pattern seems consistent with this dermatome.    SEDATION:  Patient declined administration of moderate sedation   and , but an IV access was obtained for safety.  ANESTHETIC:  NONE  STEROID:   dexamethasone 15mg    DESCRIPTON OF PROCEDURE:    After obtaining informed consent, I.V. was started in the preop area.   The patient was taken to the operating room and placed in the prone position.  EKG, blood pressure, and pulse oximeter were monitored throughout, and sedation was provided as needed by the RN under my guidance. All pressure points were well padded.  The cervicothoracic spine area was prepped with Chloraprep and draped in a sterile fashion.  Under fluoroscopic guidance, the aforementioned interlaminar space was identified. Skin and subcutaneous tissues were anesthetized with 1% lidocaine in the middle of the space. A Tuohy needle was introduced through the skin and advanced to this interlaminar space and into the epidural space under fluoroscopic guidance and verified with loss-of-resistance technique to air.  After confirming the position of the needle with the  fluoroscope with all the views, and after aspiration was confirmed negative for blood and CSF, 1.5 mL of Omnipaque was injected.  After seeing appropriate epidurogram with lateral and PA views, solution was injected, consisting of 1.5cc of injectable steroid as above.     ESTIMATED BLOOD LOSS:  <5 mL  SPECIMENS:  None    COMPLICATIONS:     There was no indication of vascular uptake on live injection of contrast dye., There was indication that a dural puncture occurred., The patient did not have any signs of postprocedure numbness nor weakness., The patient was reassured. and there was negative aspiration noted.  Injection of contrast agent was easy and not intraneural.    TOLERANCE & DISCHARGE CONDITION:    The patient tolerated the procedure well.  The patient was transported to the recovery area without difficulties.  The patient was discharged to home under the care of family in stable and satisfactory condition.    PLAN OF CARE:  1. The patient was given our standard instruction sheet.  2. The patient will Return to clinic 3-4 wks.  3. The patient will resume all medications as per the medication reconciliation sheet.

## 2022-09-20 NOTE — H&P
Brief Pre-procedural / Pre-operative H&P        -----    Pertinent Diagnosis:   Cervical radiculopathy/C7 -especially right    Proposed Procedure: Cervical epidural steroid injection      Subjective   Bogdan Garcia is a 32 y.o. female  who presents for intervention.  She has a history of neck pain.      History of Present Illness     She has had 8 or 9 months of neck pain especially on the right radiating into the right arm.  She had some mild improvement with oral steroids and physical therapy but then pain returned and there is been increasing pain into the right arm and that sharp and there is also numbness and some heaviness.  Pain increases with lying down and twisting and other positions especially with sleep positions and other jarring movements.  Traction and physical therapy exercises do help some but she still rating her pain as severe.    She requests no sedation.    -------    The following portions of the patient's history were reviewed and updated as appropriate: allergies, current medications, past family history, past medical history, past social history, past surgical history and problem list.    Allergies   Allergen Reactions   • Monistat [Miconazole] Itching         Current Facility-Administered Medications:   •  lactated ringers infusion, 20 mL/hr, Intravenous, Continuous, Bernabe Davidson MD  •  sodium chloride 0.9 % flush 10 mL, 10 mL, Intravenous, Q12H, Bernabe Davidson MD  •  sodium chloride 0.9 % flush 10 mL, 10 mL, Intravenous, PRN, Bernabe Davidson MD    No current facility-administered medications on file prior to encounter.     Current Outpatient Medications on File Prior to Encounter   Medication Sig Dispense Refill   • fludrocortisone 0.1 MG tablet TAKE 1 AND 1/2 TABLETS BY MOUTH DAILY 135 tablet 3   • hydrOXYzine (ATARAX) 50 MG tablet          Patient Active Problem List   Diagnosis   • Atypical migraine   • Disorder of autonomic nervous system   • Acute appendicitis with  "localized peritonitis, without perforation, abscess, or gangrene   • Anxiety   • Family history of ovarian cancer   • Cervical disc herniation   • Cervical radiculopathy at C7       Past Medical History:   Diagnosis Date   • Asthma, exercise induced    • Migraine    • Vaginal delivery     baby boy \"Robbins\"        Past Surgical History:   Procedure Laterality Date   • APPENDECTOMY N/A 2019    Procedure: APPENDECTOMY LAPAROSCOPIC;  Surgeon: Lorenzo Pena MD;  Location: [facility];  Service: General   • COLONOSCOPY N/A 2009    Normal-Dr. Jason Vivas       Family History   Problem Relation Age of Onset   • Diabetes Other    • Heart disease Other    • Hypertension Other    • Other Mother         Phlebitis   • Ovarian cancer Maternal Grandmother 50   • Diabetes Maternal Grandfather    • Coronary artery disease Maternal Grandfather    • Heart attack Maternal Grandfather    • Stroke Cousin    • No Known Problems Son    • No Known Problems Father    • No Known Problems Sister    • Lung cancer Paternal Grandmother    • No Known Problems Paternal Grandfather        Social History     Socioeconomic History   • Marital status: Single   Tobacco Use   • Smoking status: Former Smoker     Packs/day: 0.50     Types: Cigars     Quit date:      Years since quittin.7   • Smokeless tobacco: Former User   Vaping Use   • Vaping Use: Never used   Substance and Sexual Activity   • Alcohol use: No     Comment: sober for 7 years   • Drug use: No   • Sexual activity: Not Currently     Partners: Male     Birth control/protection: None       -------       Review of Systems  No Fever, No Chills, No ear pain, No sinus pressure or drainage, No eye pain or drainage, No cough, No SOB, No chest tightness nor chest pain, no palpitations.          Vitals:    22 0906   BP: 129/87   BP Location: Left arm   Patient Position: Lying   Pulse: 55   Resp: 16   Temp: 98.6 °F (37 °C)   TempSrc: Tympanic   SpO2: 100% " "  Weight: 59 kg (130 lb)   Height: 167.6 cm (66\")         Objective   Physical Exam  VSS, NNR, NCAT, NMNA, NRD, AAOx3.  Pain with range of motion maneuvers and Spurling increases pain and has some positivity on that right side    -------    Assessment & Plan:  - as noted above, the stated intervention is indicated  - Follow-up plan will be noted in the operative report        We will plan to see her back in 4 weeks in the office and see where she stands      EMR Dragon/Transcription disclaimer:   Typed items in this encounter note may have been created by electronic transcription/translation software which converts spoken language to printed text. The electronic translation of spoken language may permit erroneous, or at times, nonsensical words or phrases to be inadvertently transcribed; Although I have reviewed the note for such errors, some may still exist.      "

## 2022-10-18 ENCOUNTER — OFFICE VISIT (OUTPATIENT)
Dept: PAIN MEDICINE | Facility: CLINIC | Age: 32
End: 2022-10-18

## 2022-10-18 VITALS
DIASTOLIC BLOOD PRESSURE: 65 MMHG | OXYGEN SATURATION: 98 % | HEIGHT: 66 IN | TEMPERATURE: 97.7 F | RESPIRATION RATE: 12 BRPM | HEART RATE: 68 BPM | WEIGHT: 129 LBS | SYSTOLIC BLOOD PRESSURE: 97 MMHG | BODY MASS INDEX: 20.73 KG/M2

## 2022-10-18 DIAGNOSIS — M54.12 CERVICAL RADICULOPATHY AT C7: ICD-10-CM

## 2022-10-18 DIAGNOSIS — M50.20 CERVICAL DISC HERNIATION: Primary | ICD-10-CM

## 2022-10-18 PROCEDURE — 99214 OFFICE O/P EST MOD 30 MIN: CPT | Performed by: NURSE PRACTITIONER

## 2022-10-18 RX ORDER — CELECOXIB 100 MG/1
100 CAPSULE ORAL 2 TIMES DAILY PRN
Qty: 60 CAPSULE | Refills: 2 | OUTPATIENT
Start: 2022-10-18 | End: 2023-01-13

## 2022-10-18 NOTE — PROGRESS NOTES
CHIEF COMPLAINT  PROCEDURE FOLLOW UP CERVICAL EPIDURAL STEROID INJECTION 9/20/2022  Patient in office reports cervical epidural was not successful her pain level has gotten worse. Mentioned she had a spinal fluid leak from the procedure. Also states she is experiencing pain on her left side Sunday morning.     Subjective   Bogdan Garcia is a 32 y.o. female  who presents to the office for follow-up of procedure.  She completed a MELBA at C6/7  on  9/20/2022 performed by Dr. Davidson for management of neck pain. Patient reports No relief from the procedure. She states that she jerked during the procedure and she is unsure if this contributed. She did have a dural puncture during her procedure and reports that she did have a headache and nausea for ~1 week after her procedure.     Today her pain is 4/10VAS in severity. 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      Dr. Ny prescribed her Gabapentin, she took this intermittently, didn't like how she felt the next day.     Past therapies:  Physical Therapy: Yes, currently in PT, initial improvement  Chiropractor: None  Massage Therapy: Yes, not efficacious  Dry Needling: Yes, helpful  Cupping: Yes, helpful  Traction: Yes, helpful  TENS: None  Heat/Ice: Not efficacious  Neck or back surgery: None  Past pain management: None     Previous Injections: None    Neck Pain   This is a chronic problem. The current episode started more than 1 year ago. The problem occurs constantly. The problem has been gradually worsening. The pain is present in the right side. The quality of the pain is described as stabbing. The pain is at a severity of 4/10. The symptoms are aggravated by position and twisting (ROM). Associated symptoms include headaches, numbness (right hand ) and weakness. Pertinent negatives include no chest pain or fever. She has tried  ice, acetaminophen and NSAIDs (PT, lidocaine patch) for the symptoms.      PEG Assessment   What number best describes your pain on average in the past week?8  What number best describes how, during the past week, pain has interfered with your enjoyment of life?8  What number best describes how, during the past week, pain has interfered with your general activity?  8    Review of Pertinent Medical Data ---  MRI OF THE CERVICAL SPINE WITHOUT CONTRAST     CLINICAL HISTORY: Cervical radiculopathy. Right-sided pain with numbness  in fingers.     TECHNIQUE: MRI of the cervical spine is obtained with sagittal T1, T2,  and gradient echo images. Additionally, there are axial gradient echo  and oblique sagittal T2-weighted images through the cervical spine.     FINDINGS:     There is mild loss of the usual lordotic curvature of the cervical  spine. Otherwise, normal alignment is noted.     At C2-3, C3-4, C4-5, and C5-6 there is no significant canal or foraminal  stenosis.     At C6-7, there is a right central and medial foraminal disc extrusion  which is within the position of causing mass effect upon the exiting  right C7 nerve root. There is no significant degree of left foraminal  narrowing. Mild to moderate canal narrowing is seen along the right  aspect of the cervical spinal canal secondary to this disc extrusion.     At C7-T1, there is no significant degree of canal or foraminal stenosis.     The cervical spinal cord has normal signal intensity. The visualized  contents of the cranial vault are unremarkable. There are no  pathological areas of marrow replacement. No abnormal areas of  susceptibility artifact are noted.     IMPRESSION:     There is a right central/medial foraminal disc extrusion at C6-7 that  results in a mild-to-moderate degree of canal narrowing along the right  side of the cervical spinal canal and is within the position of exerting  mass effect upon the exiting right C7 nerve root. Please correlate  "for a  right C7 radiculopathy. Otherwise, no other significant canal or  foraminal stenosis is seen throughout the cervical spine.     This report was finalized on 7/15/2022 7:10 AM by Dr. Bryant Shrestha M.D.     The following portions of the patient's history were reviewed and updated as appropriate: allergies, current medications, past family history, past medical history, past social history, past surgical history and problem list.    Review of Systems   Constitutional: Negative for activity change (less), fatigue and fever.   HENT: Negative for congestion.    Eyes: Negative for visual disturbance.   Respiratory: Negative for cough and chest tightness.    Cardiovascular: Negative for chest pain.   Gastrointestinal: Positive for constipation. Negative for abdominal pain and diarrhea.   Genitourinary: Negative for difficulty urinating and dysuria.   Musculoskeletal: Positive for neck pain.   Neurological: Positive for dizziness, weakness, light-headedness, numbness (right hand ) and headaches.   Psychiatric/Behavioral: Positive for agitation (pain) and sleep disturbance. Negative for suicidal ideas. The patient is not nervous/anxious.      --  The aforementioned information the Chief Complaint section and above subjective data including any HPI data, and also the Review of Systems data, has been personally reviewed and affirmed.  --     Vitals:    10/18/22 1326   BP: 97/65   BP Location: Left arm   Patient Position: Sitting   Cuff Size: Adult   Pulse: 68   Resp: 12   Temp: 97.7 °F (36.5 °C)   TempSrc: Temporal   SpO2: 98%   Weight: 58.5 kg (129 lb)   Height: 167.6 cm (66\")   PainSc:   4   PainLoc: Comment: NECK     Objective   Physical Exam  Vitals and nursing note reviewed.   Constitutional:       Appearance: Normal appearance. She is well-developed.   Eyes:      General: Lids are normal.   Cardiovascular:      Rate and Rhythm: Normal rate.   Pulmonary:      Effort: Pulmonary effort is normal.   Musculoskeletal:    "   Cervical back: Tenderness present. Decreased range of motion.   Neurological:      Mental Status: She is alert and oriented to person, place, and time.      Motor: No weakness.   Psychiatric:         Attention and Perception: Attention normal.         Mood and Affect: Mood normal.         Speech: Speech normal.         Behavior: Behavior normal.         Judgment: Judgment normal.       Assessment & Plan   Diagnoses and all orders for this visit:    1. Cervical disc herniation (Primary)    2. Cervical radiculopathy at C7    Other orders  -     celecoxib (CeleBREX) 100 MG capsule; Take 1 capsule by mouth 2 (Two) Times a Day As Needed for Mild Pain. Take with food  Dispense: 60 capsule; Refill: 2      Discussed with her disc extrusion as well as mass effect on her right C7 nerve and no relief with the MELBA I recommend evaluation by neurosurgery.     --- She declines referral to neurosurgeon.   --- She would like to continue with PT at this point and trial a topical for pain control.    --- I will prescribe compounded pain cream. This will be sent to [facility].   --- Start celebrex 100 mg BID. Take with food.   --- Reviewed that if her right arm pain increases or she develops any weakness I recommend neurosurgery evaluation. Patient states understanding.   --- Follow-up 3 months or sooner If needed.      Dictated utilizing Dragon dictation.      This document is intended for medical expert use only. Reading of this document by patients and/or patient's family without participating medical staff guidance may result in misinterpretation and unintended morbidity.   Any interpretation of such data is the responsibility of the patient and/or family member responsible for the patient in concert with their primary or specialist providers, not to be left for sources of online searches such as Meeps, Octapoly or similar queries. Relying on these approaches to knowledge may result in misinterpretation, misguided  goals of care and even death should patients or family members try recommendations outside of the realm of professional medical care in a supervised way.    Patient remained masked during entire encounter. No cough present. I donned a mask and eye protection throughout entire visit. Prior to donning mask and eye protection, hand hygiene was performed, as well as when it was doffed.  I was closer than 6 feet, but not for an extended period of time. No obvious exposure to any bodily fluids.

## 2023-01-13 ENCOUNTER — HOSPITAL ENCOUNTER (EMERGENCY)
Facility: HOSPITAL | Age: 33
Discharge: HOME | End: 2023-01-13
Attending: EMERGENCY MEDICINE | Admitting: EMERGENCY MEDICINE
Payer: COMMERCIAL

## 2023-01-13 ENCOUNTER — APPOINTMENT (OUTPATIENT)
Dept: GENERAL RADIOLOGY | Facility: HOSPITAL | Age: 33
End: 2023-01-13
Payer: COMMERCIAL

## 2023-01-13 VITALS
BODY MASS INDEX: 18.4 KG/M2 | TEMPERATURE: 98.4 F | DIASTOLIC BLOOD PRESSURE: 66 MMHG | RESPIRATION RATE: 16 BRPM | HEIGHT: 66 IN | WEIGHT: 114.5 LBS | SYSTOLIC BLOOD PRESSURE: 114 MMHG | HEART RATE: 57 BPM | OXYGEN SATURATION: 100 %

## 2023-01-13 DIAGNOSIS — J06.9 UPPER RESPIRATORY TRACT INFECTION, UNSPECIFIED TYPE: Primary | ICD-10-CM

## 2023-01-13 LAB
ALBUMIN SERPL-MCNC: 4.6 G/DL (ref 3.5–5.2)
ALBUMIN/GLOB SERPL: 1.9 G/DL
ALP SERPL-CCNC: 54 U/L (ref 39–117)
ALT SERPL W P-5'-P-CCNC: 9 U/L (ref 1–33)
ANION GAP SERPL CALCULATED.3IONS-SCNC: 7.6 MMOL/L (ref 5–15)
AST SERPL-CCNC: 19 U/L (ref 1–32)
B-HCG UR QL: NEGATIVE
BASOPHILS # BLD AUTO: 0.06 10*3/MM3 (ref 0–0.2)
BASOPHILS NFR BLD AUTO: 0.8 % (ref 0–1.5)
BILIRUB SERPL-MCNC: 0.6 MG/DL (ref 0–1.2)
BILIRUB UR QL STRIP: NEGATIVE
BUN SERPL-MCNC: 12 MG/DL (ref 6–20)
BUN/CREAT SERPL: 13.5 (ref 7–25)
CALCIUM SPEC-SCNC: 9.2 MG/DL (ref 8.6–10.5)
CHLORIDE SERPL-SCNC: 101 MMOL/L (ref 98–107)
CLARITY UR: CLEAR
CO2 SERPL-SCNC: 24.4 MMOL/L (ref 22–29)
COLOR UR: YELLOW
CREAT SERPL-MCNC: 0.89 MG/DL (ref 0.57–1)
DEPRECATED RDW RBC AUTO: 40.2 FL (ref 37–54)
EGFRCR SERPLBLD CKD-EPI 2021: 88.5 ML/MIN/1.73
EOSINOPHIL # BLD AUTO: 0.21 10*3/MM3 (ref 0–0.4)
EOSINOPHIL NFR BLD AUTO: 2.7 % (ref 0.3–6.2)
ERYTHROCYTE [DISTWIDTH] IN BLOOD BY AUTOMATED COUNT: 12 % (ref 12.3–15.4)
FLUAV RNA RESP QL NAA+PROBE: NOT DETECTED
FLUBV RNA RESP QL NAA+PROBE: NOT DETECTED
GLOBULIN UR ELPH-MCNC: 2.4 GM/DL
GLUCOSE SERPL-MCNC: 93 MG/DL (ref 65–99)
GLUCOSE UR STRIP-MCNC: NEGATIVE MG/DL
HCT VFR BLD AUTO: 43.1 % (ref 34–46.6)
HGB BLD-MCNC: 14.5 G/DL (ref 12–15.9)
HGB UR QL STRIP.AUTO: NEGATIVE
IMM GRANULOCYTES # BLD AUTO: 0.02 10*3/MM3 (ref 0–0.05)
IMM GRANULOCYTES NFR BLD AUTO: 0.3 % (ref 0–0.5)
KETONES UR QL STRIP: NEGATIVE
LEUKOCYTE ESTERASE UR QL STRIP.AUTO: NEGATIVE
LIPASE SERPL-CCNC: 51 U/L (ref 13–60)
LYMPHOCYTES # BLD AUTO: 2.01 10*3/MM3 (ref 0.7–3.1)
LYMPHOCYTES NFR BLD AUTO: 26.3 % (ref 19.6–45.3)
MAGNESIUM SERPL-MCNC: 2.3 MG/DL (ref 1.6–2.6)
MCH RBC QN AUTO: 31 PG (ref 26.6–33)
MCHC RBC AUTO-ENTMCNC: 33.6 G/DL (ref 31.5–35.7)
MCV RBC AUTO: 92.3 FL (ref 79–97)
MONOCYTES # BLD AUTO: 0.54 10*3/MM3 (ref 0.1–0.9)
MONOCYTES NFR BLD AUTO: 7.1 % (ref 5–12)
NEUTROPHILS NFR BLD AUTO: 4.81 10*3/MM3 (ref 1.7–7)
NEUTROPHILS NFR BLD AUTO: 62.8 % (ref 42.7–76)
NITRITE UR QL STRIP: NEGATIVE
NRBC BLD AUTO-RTO: 0 /100 WBC (ref 0–0.2)
PH UR STRIP.AUTO: 5.5 [PH] (ref 4.5–8)
PLATELET # BLD AUTO: 193 10*3/MM3 (ref 140–450)
PMV BLD AUTO: 9.9 FL (ref 6–12)
POTASSIUM SERPL-SCNC: 4.7 MMOL/L (ref 3.5–5.2)
PROT SERPL-MCNC: 7 G/DL (ref 6–8.5)
PROT UR QL STRIP: NEGATIVE
RBC # BLD AUTO: 4.67 10*6/MM3 (ref 3.77–5.28)
SARS-COV-2 RNA RESP QL NAA+PROBE: NOT DETECTED
SODIUM SERPL-SCNC: 133 MMOL/L (ref 136–145)
SP GR UR STRIP: <=1.005 (ref 1–1.03)
TSH SERPL DL<=0.05 MIU/L-ACNC: 1.65 UIU/ML (ref 0.27–4.2)
UROBILINOGEN UR QL STRIP: NORMAL
WBC NRBC COR # BLD: 7.65 10*3/MM3 (ref 3.4–10.8)

## 2023-01-13 PROCEDURE — 71045 X-RAY EXAM CHEST 1 VIEW: CPT

## 2023-01-13 PROCEDURE — 83735 ASSAY OF MAGNESIUM: CPT | Performed by: EMERGENCY MEDICINE

## 2023-01-13 PROCEDURE — 80050 GENERAL HEALTH PANEL: CPT | Performed by: EMERGENCY MEDICINE

## 2023-01-13 PROCEDURE — 99283 EMERGENCY DEPT VISIT LOW MDM: CPT

## 2023-01-13 PROCEDURE — 83690 ASSAY OF LIPASE: CPT | Performed by: EMERGENCY MEDICINE

## 2023-01-13 PROCEDURE — 81003 URINALYSIS AUTO W/O SCOPE: CPT | Performed by: EMERGENCY MEDICINE

## 2023-01-13 PROCEDURE — 87636 SARSCOV2 & INF A&B AMP PRB: CPT | Performed by: EMERGENCY MEDICINE

## 2023-01-13 PROCEDURE — 36415 COLL VENOUS BLD VENIPUNCTURE: CPT

## 2023-01-13 PROCEDURE — 81025 URINE PREGNANCY TEST: CPT | Performed by: EMERGENCY MEDICINE

## 2023-01-13 RX ORDER — AZITHROMYCIN 250 MG/1
TABLET, FILM COATED ORAL
Qty: 6 TABLET | Refills: 0 | Status: SHIPPED | OUTPATIENT
Start: 2023-01-13

## 2023-01-13 RX ORDER — METHYLPREDNISOLONE 4 MG/1
TABLET ORAL
Qty: 21 TABLET | Refills: 0 | Status: SHIPPED | OUTPATIENT
Start: 2023-01-13

## 2023-01-13 NOTE — ED PROVIDER NOTES
"Subjective   History of Present Illness  32-year-old female past medical history significant for migraine headaches presents emergency room for complaint of several days worth of fatigue decreased appetite and depressed mood.  Patient states that she has had a mild cough for the last several weeks but she does not feel that that is related.  Patient was seen in urgent treatment center for this complaint earlier today and they discharged her and told her to come to the emergency room for a 'work-up.'  Patient states she has normal periods with the first couple days of heavy bleeding but this is not different or unusual as this is been present her entire life since menarche.  Patient otherwise states no change in her activity or eating habits.  She does state that she started a new job the last couple months that is stressful and has been bothering her and she thinks it may be related to anxiety but she is not sure.        Review of Systems   Constitutional: Positive for activity change, appetite change and fatigue. Negative for chills, diaphoresis and fever.   HENT: Negative for congestion, rhinorrhea and sore throat.    Eyes: Negative for photophobia and visual disturbance.   Respiratory: Negative for cough and shortness of breath.    Cardiovascular: Negative for chest pain, palpitations and leg swelling.   Gastrointestinal: Negative for abdominal distention, abdominal pain, diarrhea, nausea and vomiting.   Genitourinary: Negative for dysuria and flank pain.   Musculoskeletal: Negative for arthralgias and back pain.   Skin: Negative for rash.   Neurological: Negative for dizziness, weakness and headaches.   Psychiatric/Behavioral: Negative for agitation and behavioral problems.       Past Medical History:   Diagnosis Date   • Asthma, exercise induced    • Migraine    • Vaginal delivery     baby boy \"Robbins\"        Allergies   Allergen Reactions   • Monistat [Miconazole] Itching       Past Surgical History: "   Procedure Laterality Date   • APPENDECTOMY N/A 2019    Procedure: APPENDECTOMY LAPAROSCOPIC;  Surgeon: Lorenzo Pena MD;  Location: [facility] MAIN OR;  Service: General   • COLONOSCOPY N/A 2009    Normal-Dr. Jason Vivas   • EPIDURAL N/A 2022    Procedure: CERVICAL EPIDURAL STEROID INJECTION;  Surgeon: Bernabe Davidson MD;  [place] [facility] [place];  Service: Pain Management;  Laterality: N/A;       Family History   Problem Relation Age of Onset   • Diabetes Other    • Heart disease Other    • Hypertension Other    • Other Mother         Phlebitis   • Ovarian cancer Maternal Grandmother 50   • Diabetes Maternal Grandfather    • Coronary artery disease Maternal Grandfather    • Heart attack Maternal Grandfather    • Stroke Cousin    • No Known Problems Son    • No Known Problems Father    • No Known Problems Sister    • Lung cancer Paternal Grandmother    • No Known Problems Paternal Grandfather        Social History     Socioeconomic History   • Marital status: Single   Tobacco Use   • Smoking status: Former     Packs/day: 0.50     Types: Cigars, Cigarettes     Quit date:      Years since quittin.0   • Smokeless tobacco: Former   Vaping Use   • Vaping Use: Never used   Substance and Sexual Activity   • Alcohol use: No     Comment: sober for 7 years   • Drug use: No   • Sexual activity: Not Currently     Partners: Male     Birth control/protection: None           Objective   Physical Exam  Constitutional:       General: She is not in acute distress.     Appearance: Normal appearance. She is not ill-appearing, toxic-appearing or diaphoretic.   HENT:      Head: Normocephalic and atraumatic.      Nose: Nose normal.      Mouth/Throat:      Mouth: Mucous membranes are moist.   Eyes:      Conjunctiva/sclera: Conjunctivae normal.   Cardiovascular:      Rate and Rhythm: Normal rate and regular rhythm.      Pulses: Normal pulses.   Pulmonary:      Effort: Pulmonary effort is normal.       Breath sounds: Normal breath sounds.   Abdominal:      General: Abdomen is flat. There is no distension.      Tenderness: There is no abdominal tenderness.   Musculoskeletal:         General: No swelling. Normal range of motion.      Cervical back: Normal range of motion and neck supple.   Skin:     General: Skin is warm and dry.   Neurological:      General: No focal deficit present.      Mental Status: She is alert and oriented to person, place, and time.   Psychiatric:         Mood and Affect: Mood normal.         Procedures           ED Course  ED Course as of 01/21/23 1207   Fri Jan 13, 2023   1250 Patient's lab work is all within normal limits.  Pregnancy test is negative TSH is within normal and urine is normal as well as COVID and flu are both not detected.  Patient's chest x-ray shows possible right-sided haziness.  This combined with patient's history of several weeks of cough and upper respiratory symptoms cause me to prescribe antibiotic and steroid for her possible improvement in symptoms otherwise patient is asked to start taking multivitamin and reevaluate with primary care physician as needed.  Patient states he understands agrees with plan. []   Sat Jan 21, 2023   1205 Glucose: 93 []      ED Course User Index  [] Jayy Chisholm MD                                           Medical Decision Making  My differential diagnosis for cough includes but is not limited to:  Upper respiratory infection, bronchitis, pneumonia, COPD exacerbation, cough variant asthma, cardiac asthma, coronary artery disease, congestive heart failure, bacterial, viral or lung infections, lung cancer, aspiration pneumonitis, aspiration of foreign body and Covid -19        Amount and/or Complexity of Data Reviewed  Labs: ordered. Decision-making details documented in ED Course.  Radiology: ordered. Decision-making details documented in ED Course.          Final diagnoses:   Upper respiratory tract infection,  unspecified type       ED Disposition  ED Disposition     ED Disposition   Discharge    Condition   Stable    Comment   --             [facility]  [place] [address]  [phone]  Call       [facility] Emergency Department  [address]  [place] [address] [phone]  Go to   As needed         Medication List      New Prescriptions    azithromycin 250 MG tablet  Commonly known as: Zithromax Z-Kyle  Take 2 tablets by mouth on day 1, then 1 tablet daily on days 2-5     methylPREDNISolone 4 MG dose pack  Commonly known as: MEDROL  Take as directed on package instructions.           Where to Get Your Medications      These medications were sent to [id number] - [place], [place] - [address] AT [facility] [address] - [phone]  - [phone] FX  [address] [place] [address]    Phone: [phone]   · azithromycin 250 MG tablet  · methylPREDNISolone 4 MG dose pack          Jayy Chisholm MD  01/13/23 1254       Jayy Chisholm MD  01/21/23 1201

## 2023-01-13 NOTE — Clinical Note
[facility]  [address]  [place] [address]  Phone: [phone]    Bogdan Garcia was seen and treated in our emergency department on 1/13/2023.  She may return to work on 01/16/2023.         Thank you for choosing [facility].    Jayy Chisholm MD

## 2023-03-17 DIAGNOSIS — G90.9 DISORDER OF AUTONOMIC NERVOUS SYSTEM: ICD-10-CM

## 2023-03-17 DIAGNOSIS — N30.00 ACUTE CYSTITIS WITHOUT HEMATURIA: ICD-10-CM

## 2023-03-17 DIAGNOSIS — F41.0 PANIC DISORDER: ICD-10-CM

## 2023-03-17 DIAGNOSIS — G43.009 ATYPICAL MIGRAINE: ICD-10-CM

## 2023-03-17 RX ORDER — FLUDROCORTISONE ACETATE 0.1 MG/1
TABLET ORAL
Qty: 45 TABLET | Refills: 0 | Status: SHIPPED | OUTPATIENT
Start: 2023-03-17

## 2023-05-12 DIAGNOSIS — G90.9 DISORDER OF AUTONOMIC NERVOUS SYSTEM: ICD-10-CM

## 2023-05-12 DIAGNOSIS — G43.009 ATYPICAL MIGRAINE: ICD-10-CM

## 2023-05-12 DIAGNOSIS — N30.00 ACUTE CYSTITIS WITHOUT HEMATURIA: ICD-10-CM

## 2023-05-12 DIAGNOSIS — F41.0 PANIC DISORDER: ICD-10-CM

## 2023-05-12 RX ORDER — FLUDROCORTISONE ACETATE 0.1 MG/1
TABLET ORAL
Qty: 45 TABLET | Refills: 0 | OUTPATIENT
Start: 2023-05-12

## 2023-05-15 ENCOUNTER — TELEPHONE (OUTPATIENT)
Dept: SPORTS MEDICINE | Facility: CLINIC | Age: 33
End: 2023-05-15
Payer: COMMERCIAL

## 2023-05-15 RX ORDER — FLUDROCORTISONE ACETATE 0.1 MG/1
0.1 TABLET ORAL DAILY
Qty: 30 TABLET | Refills: 0 | Status: SHIPPED | OUTPATIENT
Start: 2023-05-15

## 2023-05-15 NOTE — TELEPHONE ENCOUNTER
Spoke with patient today in regards to refill, this is her last refill from our office. She is to find a new pcp, we can no longer prescribe medication for her. I provided her with the [facility] office contact information to call herself.     Thanks  Kylie

## 2023-05-15 NOTE — TELEPHONE ENCOUNTER
Patient called and would like a refill on Fludrocortisone. Patient stated she is out and took her last pill today. confirmed Pharmacy    [id number] - [place], [place] - [address] AT [facility] OF [address] - [phone]  - [phone] FX   [address] [address]   Phone:  [phone]  Fax:  [phone]       Please advise    -Rosi

## 2023-06-09 DIAGNOSIS — G43.009 ATYPICAL MIGRAINE: ICD-10-CM

## 2023-06-09 DIAGNOSIS — F41.0 PANIC DISORDER: ICD-10-CM

## 2023-06-09 DIAGNOSIS — G90.9 DISORDER OF AUTONOMIC NERVOUS SYSTEM: ICD-10-CM

## 2023-06-09 DIAGNOSIS — N30.00 ACUTE CYSTITIS WITHOUT HEMATURIA: ICD-10-CM

## 2023-06-09 RX ORDER — FLUDROCORTISONE ACETATE 0.1 MG/1
0.1 TABLET ORAL DAILY
Qty: 30 TABLET | Refills: 0 | Status: SHIPPED | OUTPATIENT
Start: 2023-06-09

## 2023-06-13 ENCOUNTER — OFFICE VISIT (OUTPATIENT)
Dept: SPORTS MEDICINE | Facility: CLINIC | Age: 33
End: 2023-06-13
Payer: COMMERCIAL

## 2023-06-13 VITALS
BODY MASS INDEX: 18.32 KG/M2 | TEMPERATURE: 98.4 F | OXYGEN SATURATION: 99 % | WEIGHT: 114 LBS | HEART RATE: 66 BPM | DIASTOLIC BLOOD PRESSURE: 78 MMHG | HEIGHT: 66 IN | RESPIRATION RATE: 16 BRPM | SYSTOLIC BLOOD PRESSURE: 118 MMHG

## 2023-06-13 DIAGNOSIS — M53.3 SACROILIAC JOINT DYSFUNCTION OF RIGHT SIDE: Primary | ICD-10-CM

## 2023-06-13 RX ORDER — CELECOXIB 200 MG/1
CAPSULE ORAL
Qty: 30 CAPSULE | Refills: 0 | Status: SHIPPED | OUTPATIENT
Start: 2023-06-13

## 2023-06-13 NOTE — PROGRESS NOTES
"Chief Complaint  Back Pain (SI joint pain for a week after a weighted lunge. )    Subjective        Bogdan Garcia presents to [facility]  Back Pain  This is a recurrent problem. The current episode started in the past 7 days. The problem occurs 2 to 4 times per day. The problem has been waxing and waning since onset. The pain is present in the lumbar spine. The quality of the pain is described as stabbing. The pain is at a severity of 9/10. The pain is The same all the time. The symptoms are aggravated by bending, position, sitting and standing. Stiffness is present All day. Associated symptoms include paresthesias, pelvic pain and weakness. Pertinent negatives include no abdominal pain, bladder incontinence, bowel incontinence, chest pain, dysuria, fever, headaches, leg pain, numbness, paresis, perianal numbness, tingling or weight loss. Risk factors include recent trauma.   Patient is planning to compete in the [employer] [place] pageant in 12 weeks.  She has been working out in preparation for this and last week she was doing a weighted lines and had immediate onset right sacral posterior hip pain.  No radiation of the pain.  Patient went to urgent care center 4 days ago for evaluation and was diagnosed with a right SI joint inflammation.  She was given Toradol injection, oral Toradol, tapering prednisone and muscle relaxant.  Patient states the only medication she is taking is the prednisone, she has seen only mild relief of her pain.  Pain is worse with prolonged standing, running, or prolonged sitting to standing.  Objective   Vital Signs:  /78 (BP Location: Left arm, Patient Position: Sitting, Cuff Size: Adult)   Pulse 66   Temp 98.4 °F (36.9 °C)   Resp 16   Ht 167.6 cm (66\")   Wt 51.7 kg (114 lb)   SpO2 99%   BMI 18.40 kg/m²   Estimated body mass index is 18.4 kg/m² as calculated from the following:    Height as of this encounter: 167.6 cm (66\").    Weight as of " this encounter: 51.7 kg (114 lb).             Physical Exam  Vitals reviewed.   Constitutional:       Appearance: She is well-developed.   HENT:      Head: Normocephalic and atraumatic.   Eyes:      Conjunctiva/sclera: Conjunctivae normal.      Pupils: Pupils are equal, round, and reactive to light.   Cardiovascular:      Comments: No peripheral edema  Pulmonary:      Effort: Pulmonary effort is normal.   Musculoskeletal:      Comments: Lumbar spine normal in general appearance.  Patient has full range of motion although she does have pain and tenderness to palpation on the superior aspect of the right SI joint and is slightly exacerbated at the end of forward flexion.  Patient has no pain with extension, lateral bending or rotation of the trunk.  Patient has negative straight leg raise and negative slump test.  Right hip with negative logroll.  Negative FADIR.  Patient has a positive BRIGETTE ER which reproduces pain in the right SI area.   Skin:     General: Skin is warm and dry.   Neurological:      Mental Status: She is alert and oriented to person, place, and time.   Psychiatric:         Behavior: Behavior normal.      Result Review :          Urgent Care Provider Note by Lebron Centeno MD (06/09/2023 13:27)   Pelvis X-Ray  Indication: Pain  AP and Frogleg views    Findings:  No fracture  No bony lesion  Normal soft tissues  Normal joint spaces    No prior studies were available for comparison.           Assessment and Plan   Diagnoses and all orders for this visit:    1. Sacroiliac joint dysfunction of right side (Primary)  -     Cancel: XR Hip With or Without Pelvis 2 - 3 View Right; Future  -     celecoxib (CeleBREX) 200 MG capsule; 1 bid for 5 days then 1 qd thereafter. Take with food  Dispense: 30 capsule; Refill: 0  -     Ambulatory Referral to Physical Therapy    Patient's history and exam is consistent with right SI joint inflammation, possible sprain.  We will discontinue all her other medications except I  would like for her to finish her prednisone, will then add Celebrex as above.  Also will start physical therapy.  If she sees no significant improvement after 3 to 4 weeks of physical therapy could consider ultrasound-guided right SI joint injection.  She will modify her exercise routine till this is feeling better.       I spent 40 minutes caring for Bogdan on this date of service. This time includes time spent by me in the following activities:preparing for the visit, obtaining and/or reviewing a separately obtained history, performing a medically appropriate examination and/or evaluation , counseling and educating the patient/family/caregiver, ordering medications, tests, or procedures, documenting information in the medical record, and independently interpreting results and communicating that information with the patient/family/caregiver  Follow Up   No follow-ups on file.  Patient was given instructions and counseling regarding her condition or for health maintenance advice. Please see specific information pulled into the AVS if appropriate.

## 2023-08-06 DIAGNOSIS — F41.0 PANIC DISORDER: ICD-10-CM

## 2023-08-06 DIAGNOSIS — N30.00 ACUTE CYSTITIS WITHOUT HEMATURIA: ICD-10-CM

## 2023-08-06 DIAGNOSIS — G43.009 ATYPICAL MIGRAINE: ICD-10-CM

## 2023-08-06 DIAGNOSIS — G90.9 DISORDER OF AUTONOMIC NERVOUS SYSTEM: ICD-10-CM

## 2023-08-07 RX ORDER — FLUDROCORTISONE ACETATE 0.1 MG/1
0.1 TABLET ORAL DAILY
Qty: 30 TABLET | Refills: 0 | Status: SHIPPED | OUTPATIENT
Start: 2023-08-07

## 2023-08-10 ENCOUNTER — TELEPHONE (OUTPATIENT)
Dept: FAMILY MEDICINE CLINIC | Facility: CLINIC | Age: 33
End: 2023-08-10

## 2023-08-10 ENCOUNTER — OFFICE VISIT (OUTPATIENT)
Dept: FAMILY MEDICINE CLINIC | Facility: CLINIC | Age: 33
End: 2023-08-10
Payer: COMMERCIAL

## 2023-08-10 VITALS
HEIGHT: 66 IN | TEMPERATURE: 97.5 F | SYSTOLIC BLOOD PRESSURE: 110 MMHG | BODY MASS INDEX: 19.53 KG/M2 | HEART RATE: 75 BPM | OXYGEN SATURATION: 96 % | DIASTOLIC BLOOD PRESSURE: 64 MMHG | WEIGHT: 121.5 LBS

## 2023-08-10 DIAGNOSIS — N30.00 ACUTE CYSTITIS WITHOUT HEMATURIA: ICD-10-CM

## 2023-08-10 DIAGNOSIS — G90.9 DISORDER OF AUTONOMIC NERVOUS SYSTEM: ICD-10-CM

## 2023-08-10 DIAGNOSIS — G43.009 ATYPICAL MIGRAINE: Primary | ICD-10-CM

## 2023-08-10 DIAGNOSIS — F41.0 PANIC DISORDER: ICD-10-CM

## 2023-08-10 DIAGNOSIS — Z00.00 ANNUAL PHYSICAL EXAM: Primary | ICD-10-CM

## 2023-08-10 DIAGNOSIS — L60.9 NAIL DISORDER, UNSPECIFIED: ICD-10-CM

## 2023-08-10 PROCEDURE — 99214 OFFICE O/P EST MOD 30 MIN: CPT | Performed by: FAMILY MEDICINE

## 2023-08-10 RX ORDER — FLUDROCORTISONE ACETATE 0.1 MG/1
0.1 TABLET ORAL DAILY
Qty: 30 TABLET | Refills: 0 | Status: CANCELLED | OUTPATIENT
Start: 2023-08-10

## 2023-08-10 RX ORDER — TERBINAFINE HYDROCHLORIDE 250 MG/1
250 TABLET ORAL DAILY
Qty: 90 TABLET | Refills: 2 | Status: SHIPPED | OUTPATIENT
Start: 2023-08-10

## 2023-08-10 RX ORDER — EFINACONAZOLE 100 MG/ML
SOLUTION TOPICAL
COMMUNITY
Start: 2023-07-14

## 2023-08-10 NOTE — PROGRESS NOTES
"Chief Complaint  Chief Complaint   Patient presents with    [facility]     fludrocortisone 0.1 MG tablet       Subjective    History of Present Illness        Bogdan Garcia presents to [facility] for   History of Present Illness  Patient is a 30-year-old female is being seen for multiple medical problems.  Patient has complaints of hyponatremia, atypical migraine, panic disorders and fraction nail disorder all her symptoms appear to be stable except for her nail infection.  Patient has been using a topical medication to treat her symptoms     Objective   Vital Signs:   Visit Vitals  /64   Pulse 75   Temp 97.5 øF (36.4 øC) (Temporal)   Ht 167.6 cm (66\")   Wt 55.1 kg (121 lb 8 oz)   SpO2 96%   BMI 19.61 kg/mý       BMI is within normal parameters. No other follow-up for BMI required.     Physical Exam  Vitals reviewed.   Constitutional:       Appearance: She is well-developed.   HENT:      Head: Normocephalic.      Right Ear: External ear normal.      Left Ear: External ear normal.      Nose: Nose normal.   Eyes:      Conjunctiva/sclera: Conjunctivae normal.   Cardiovascular:      Rate and Rhythm: Normal rate and regular rhythm.   Pulmonary:      Effort: Pulmonary effort is normal.      Breath sounds: Normal breath sounds.   Musculoskeletal:         General: Normal range of motion.      Cervical back: Normal range of motion and neck supple.   Skin:     General: Skin is warm and dry.      Capillary Refill: Capillary refill takes less than 2 seconds.   Neurological:      Mental Status: She is alert and oriented to person, place, and time.          Result Review :                    Assessment and Plan      Diagnoses and all orders for this visit:    1. Atypical migraine (Primary)  Assessment & Plan:  Headaches are unchanged.  Continue current treatment regimen.          2. Disorder of autonomic nervous system    3. Panic disorder  Assessment & Plan:  Psychological condition is " unchanged.  Continue current treatment regimen.  Psychological condition  will be reassessed at the next regular appointment.      4. Nail disorder, unspecified  Assessment & Plan:  Patient was started on terbinafine, treat her symptoms.  Patient encouraged to return to clinic for symptoms not improved.    Orders:  -     terbinafine (lamiSIL) 250 MG tablet; Take 1 tablet by mouth Daily.  Dispense: 90 tablet; Refill: 2    5. Acute cystitis without hematuria             Follow Up   No follow-ups on file.  Patient was given instructions and counseling regarding her condition or for health maintenance advice. Please see specific information pulled into the AVS if appropriate.

## 2023-08-24 PROBLEM — L60.9 NAIL DISORDER, UNSPECIFIED: Status: ACTIVE | Noted: 2023-08-24

## 2023-08-24 PROBLEM — F41.0 PANIC DISORDER: Status: ACTIVE | Noted: 2023-08-24

## 2023-08-24 NOTE — ASSESSMENT & PLAN NOTE
Patient was started on terbinafine, treat her symptoms.  Patient encouraged to return to clinic for symptoms not improved.

## 2023-09-07 DIAGNOSIS — F41.0 PANIC DISORDER: ICD-10-CM

## 2023-09-07 DIAGNOSIS — G90.9 DISORDER OF AUTONOMIC NERVOUS SYSTEM: ICD-10-CM

## 2023-09-07 DIAGNOSIS — G43.009 ATYPICAL MIGRAINE: ICD-10-CM

## 2023-09-07 DIAGNOSIS — N30.00 ACUTE CYSTITIS WITHOUT HEMATURIA: ICD-10-CM

## 2023-09-08 DIAGNOSIS — F41.0 PANIC DISORDER: ICD-10-CM

## 2023-09-08 DIAGNOSIS — G43.009 ATYPICAL MIGRAINE: ICD-10-CM

## 2023-09-08 DIAGNOSIS — G90.9 DISORDER OF AUTONOMIC NERVOUS SYSTEM: ICD-10-CM

## 2023-09-08 DIAGNOSIS — N30.00 ACUTE CYSTITIS WITHOUT HEMATURIA: ICD-10-CM

## 2023-09-08 RX ORDER — FLUDROCORTISONE ACETATE 0.1 MG/1
0.1 TABLET ORAL DAILY
Qty: 30 TABLET | Refills: 4 | Status: SHIPPED | OUTPATIENT
Start: 2023-09-08

## 2023-09-08 NOTE — TELEPHONE ENCOUNTER
Rx Refill Note  Requested Prescriptions     Pending Prescriptions Disp Refills    fludrocortisone 0.1 MG tablet 30 tablet 0     Sig: Take 1 tablet by mouth Daily.      Last office visit with prescribing clinician: 6/13/2023   Last telemedicine visit with prescribing clinician: Visit date not found   Next office visit with prescribing clinician: Visit date not found                         Would you like a call back once the refill request has been completed: [] Yes [] No    If the office needs to give you a call back, can they leave a voicemail: [] Yes [] No    Verenice Sherwood LPN  09/08/23, 08:55 EDT

## 2023-09-11 RX ORDER — FLUDROCORTISONE ACETATE 0.1 MG/1
0.1 TABLET ORAL DAILY
Qty: 30 TABLET | Refills: 0 | OUTPATIENT
Start: 2023-09-11

## 2023-09-27 ENCOUNTER — HOSPITAL ENCOUNTER (EMERGENCY)
Facility: HOSPITAL | Age: 33
Discharge: HOME | End: 2023-09-27
Attending: STUDENT IN AN ORGANIZED HEALTH CARE EDUCATION/TRAINING PROGRAM
Payer: COMMERCIAL

## 2023-09-27 VITALS
HEIGHT: 66 IN | WEIGHT: 130 LBS | RESPIRATION RATE: 16 BRPM | HEART RATE: 67 BPM | BODY MASS INDEX: 20.89 KG/M2 | DIASTOLIC BLOOD PRESSURE: 81 MMHG | SYSTOLIC BLOOD PRESSURE: 131 MMHG | OXYGEN SATURATION: 100 %

## 2023-09-27 DIAGNOSIS — S05.02XA ABRASION OF LEFT CORNEA, INITIAL ENCOUNTER: Primary | ICD-10-CM

## 2023-09-27 PROCEDURE — 99283 EMERGENCY DEPT VISIT LOW MDM: CPT

## 2023-09-27 RX ORDER — ERYTHROMYCIN 5 MG/G
OINTMENT OPHTHALMIC
Status: COMPLETED
Start: 2023-09-27 | End: 2023-09-27

## 2023-09-27 RX ORDER — ERYTHROMYCIN 5 MG/G
1 OINTMENT OPHTHALMIC ONCE
Status: COMPLETED | OUTPATIENT
Start: 2023-09-27 | End: 2023-09-27

## 2023-09-27 RX ORDER — TETRACAINE HYDROCHLORIDE 5 MG/ML
2 SOLUTION OPHTHALMIC ONCE
Status: COMPLETED | OUTPATIENT
Start: 2023-09-27 | End: 2023-09-27

## 2023-09-27 RX ORDER — ERYTHROMYCIN 5 MG/G
OINTMENT OPHTHALMIC EVERY 6 HOURS
Qty: 3 EACH | Refills: 0 | Status: SHIPPED | OUTPATIENT
Start: 2023-09-27 | End: 2023-10-04

## 2023-09-27 RX ADMIN — TETRACAINE HYDROCHLORIDE 2 DROP: 5 SOLUTION OPHTHALMIC at 01:51

## 2023-09-27 RX ADMIN — FLUORESCEIN SODIUM 1 STRIP: 1 STRIP OPHTHALMIC at 01:51

## 2023-09-27 RX ADMIN — ERYTHROMYCIN 1 APPLICATION: 5 OINTMENT OPHTHALMIC at 02:51

## 2023-09-27 NOTE — DISCHARGE INSTRUCTIONS
You have a corneal abrasion. Please use the medication as prescribed. Follow up with the eye doctor.

## 2023-09-27 NOTE — FSED PROVIDER NOTE
"Subjective   History of Present Illness  32yo F p/w pain and irritation to L eye after she poked herself in the eye. Her  accidentally bumped her hand with his elbow. Reports that she has had burning in the eye since then and tearing. Difficulty opening eye due to pain. Does not wear contacts    History provided by:  Patient and significant other    Review of Systems   HENT:          L eye irritation and pain   All other systems reviewed and are negative.    Past Medical History:   Diagnosis Date    Asthma, exercise induced     Injury of back     Injury of neck     Migraine     Nail disorder, unspecified 2023    Shoulder injury     Vaginal delivery     baby boy \"Robbins\"        Allergies   Allergen Reactions    Monistat [Miconazole] Itching       Past Surgical History:   Procedure Laterality Date    APPENDECTOMY N/A 2019    Procedure: APPENDECTOMY LAPAROSCOPIC;  Surgeon: Lorenzo Pena MD;  Location: [facility] MAIN OR;  Service: General    COLONOSCOPY N/A 2009    Normal-Dr. Jason Vivas    EPIDURAL N/A 2022    Procedure: CERVICAL EPIDURAL STEROID INJECTION;  Surgeon: Bernabe Davidson MD;  [place] [facility];  Service: Pain Management;  Laterality: N/A;       Family History   Problem Relation Age of Onset    Diabetes Other     Heart disease Other     Hypertension Other     Other Mother         Phlebitis    Ovarian cancer Maternal Grandmother 50    Diabetes Maternal Grandfather     Coronary artery disease Maternal Grandfather     Heart attack Maternal Grandfather     Stroke Cousin     No Known Problems Son     Drug abuse Father     No Known Problems Sister     Lung cancer Paternal Grandmother     No Known Problems Paternal Grandfather        Social History     Socioeconomic History    Marital status: Single   Tobacco Use    Smoking status: Former     Packs/day: 0.50     Types: Cigarettes, Cigars     Quit date: 2015     Years since quittin.7    Smokeless tobacco: Former "   Vaping Use    Vaping Use: Never used   Substance and Sexual Activity    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   Constitutional:       General: She is not in acute distress.     Appearance: Normal appearance. She is not ill-appearing.   HENT:      Head: Normocephalic and atraumatic.      Nose: Nose normal.      Mouth/Throat:      Mouth: Mucous membranes are moist. Mucous membranes are dry.   Eyes:      Comments: PERRL, no fb L eye, mild conjunctival injection L eye, increased flourosceine uptake around 1oclock   Pulmonary:      Effort: Pulmonary effort is normal.   Musculoskeletal:         General: Normal range of motion.      Cervical back: Normal range of motion and neck supple.   Skin:     General: Skin is warm and dry.      Capillary Refill: Capillary refill takes less than 2 seconds.   Neurological:      Mental Status: She is alert.       Procedures           ED Course                                           Medical Decision Making  34yo F p/w pain and irritation to L eye after she poked herself in the eye. Exam suggestive of corneal abrasion, will give erythrmoycin. Discussed f/u with optho    Problems Addressed:  Abrasion of left cornea, initial encounter: complicated acute illness or injury    Risk  Prescription drug management.        Final diagnoses:   Abrasion of left cornea, initial encounter       ED Disposition  ED Disposition       ED Disposition   Discharge    Condition   Stable    Comment   --               [place]  [address] [place], [place] [id number] [phone]               Medication List        New Prescriptions      erythromycin 5 MG/GM ophthalmic ointment  Commonly known as: ROMYCIN  Administer  to the right eye Every 6 (Six) Hours for 7 days.               Where to Get Your Medications         Information about where to get these medications is not yet available    Ask your nurse or doctor about these medications  erythromycin 5 MG/GM ophthalmic ointment

## 2023-11-03 NOTE — TELEPHONE ENCOUNTER
Dr. Mchugh,     Patient sending [facility] Message requesting update on pelvic ultrasound from 10/31/2023. Please see message and advise.       Gilma MAHER RN on 11/3/2023 at 3:00 PM      Patient called and states that she is having some neck and back pain due to her sleeping wrong last night. She states that she would like to get something called in because she cannot turn her head or do much of anything. Please advise, thank you!

## 2023-11-06 DIAGNOSIS — G43.009 ATYPICAL MIGRAINE: ICD-10-CM

## 2023-11-06 DIAGNOSIS — N30.00 ACUTE CYSTITIS WITHOUT HEMATURIA: ICD-10-CM

## 2023-11-06 DIAGNOSIS — G90.9 DISORDER OF AUTONOMIC NERVOUS SYSTEM: ICD-10-CM

## 2023-11-06 DIAGNOSIS — F41.0 PANIC DISORDER: ICD-10-CM

## 2023-11-06 NOTE — TELEPHONE ENCOUNTER
Rx Refill Note  Requested Prescriptions     Pending Prescriptions Disp Refills    fludrocortisone 0.1 MG tablet 30 tablet 4     Sig: Take 1 tablet by mouth Daily.      Last office visit with prescribing clinician: 8/10/2023   Last telemedicine visit with prescribing clinician: Visit date not found   Next office visit with prescribing clinician: Visit date not found                         Would you like a call back once the refill request has been completed: [] Yes [] No    If the office needs to give you a call back, can they leave a voicemail: [] Yes [] No    Romana Rodriguez MA  11/06/23, 09:11 EST

## 2023-11-08 RX ORDER — FLUDROCORTISONE ACETATE 0.1 MG/1
0.1 TABLET ORAL DAILY
Qty: 30 TABLET | Refills: 4 | Status: SHIPPED | OUTPATIENT
Start: 2023-11-08

## 2023-11-24 ENCOUNTER — DOCUMENTATION (OUTPATIENT)
Dept: SPORTS MEDICINE | Facility: CLINIC | Age: 33
End: 2023-11-24
Payer: COMMERCIAL

## 2023-11-24 RX ORDER — METHYLPREDNISOLONE 4 MG/1
TABLET ORAL
Qty: 21 TABLET | Refills: 0 | Status: SHIPPED | OUTPATIENT
Start: 2023-11-24

## 2024-02-05 ENCOUNTER — OFFICE VISIT (OUTPATIENT)
Dept: FAMILY MEDICINE CLINIC | Facility: CLINIC | Age: 34
End: 2024-02-05
Payer: COMMERCIAL

## 2024-02-05 VITALS
RESPIRATION RATE: 17 BRPM | BODY MASS INDEX: 20.89 KG/M2 | TEMPERATURE: 97.5 F | WEIGHT: 130 LBS | SYSTOLIC BLOOD PRESSURE: 110 MMHG | DIASTOLIC BLOOD PRESSURE: 80 MMHG | HEIGHT: 66 IN

## 2024-02-05 DIAGNOSIS — N30.00 ACUTE CYSTITIS WITHOUT HEMATURIA: ICD-10-CM

## 2024-02-05 DIAGNOSIS — E55.9 VITAMIN D DEFICIENCY: ICD-10-CM

## 2024-02-05 DIAGNOSIS — M54.12 CERVICAL RADICULOPATHY AT C7: Primary | ICD-10-CM

## 2024-02-05 DIAGNOSIS — G90.9 DISORDER OF AUTONOMIC NERVOUS SYSTEM: ICD-10-CM

## 2024-02-05 DIAGNOSIS — Z00.00 ANNUAL PHYSICAL EXAM: Primary | ICD-10-CM

## 2024-02-05 DIAGNOSIS — F41.0 PANIC DISORDER: ICD-10-CM

## 2024-02-05 DIAGNOSIS — G43.009 ATYPICAL MIGRAINE: ICD-10-CM

## 2024-02-05 DIAGNOSIS — E23.7 PITUITARY ABNORMALITY: ICD-10-CM

## 2024-02-05 DIAGNOSIS — G43.009 ATYPICAL MIGRAINE: Chronic | ICD-10-CM

## 2024-02-05 DIAGNOSIS — E53.9 VITAMIN B DEFICIENCY: ICD-10-CM

## 2024-02-05 PROCEDURE — 99214 OFFICE O/P EST MOD 30 MIN: CPT | Performed by: FAMILY MEDICINE

## 2024-02-05 RX ORDER — FLUDROCORTISONE ACETATE 0.1 MG/1
0.1 TABLET ORAL DAILY
Qty: 30 TABLET | Refills: 4 | OUTPATIENT
Start: 2024-02-05

## 2024-02-05 RX ORDER — FLUDROCORTISONE ACETATE 0.1 MG/1
0.1 TABLET ORAL DAILY
Qty: 30 TABLET | Refills: 4 | Status: SHIPPED | OUTPATIENT
Start: 2024-02-05

## 2024-02-05 RX ORDER — EFINACONAZOLE 100 MG/ML
1 SOLUTION TOPICAL DAILY
Qty: 8 ML | Refills: 2 | Status: SHIPPED | OUTPATIENT
Start: 2024-02-05

## 2024-02-05 NOTE — TELEPHONE ENCOUNTER
Rx Refill Note  Requested Prescriptions     Pending Prescriptions Disp Refills    fludrocortisone 0.1 MG tablet 30 tablet 4     Sig: Take 1 tablet by mouth Daily.      Last office visit with prescribing clinician: 8/10/2023   Last telemedicine visit with prescribing clinician: Visit date not found   Next office visit with prescribing clinician: 2/5/2024                         Would you like a call back once the refill request has been completed: [] Yes [] No    If the office needs to give you a call back, can they leave a voicemail: [] Yes [] No    Romana Rodriguez MA  02/05/24, 12:48 EST

## 2024-02-05 NOTE — TELEPHONE ENCOUNTER
Caller: JoseBogdan    Relationship: Self    Best call back number:     [phone] (Mobile)       Requested Prescriptions:   Requested Prescriptions     Pending Prescriptions Disp Refills    fludrocortisone 0.1 MG tablet 30 tablet 4     Sig: Take 1 tablet by mouth Daily.        Pharmacy where request should be sent:  [id number] - [place], [place] - [address] [facility] [address] - [phone] PH - [phone]     Last office visit with prescribing clinician: 8/10/2023   Last telemedicine visit with prescribing clinician: Visit date not found   Next office visit with prescribing clinician: 2/5/2024     Additional details provided by patient: PATIENT IS COMPLETELY OUT OF MEDICATION. STATES THAT SHE WOULD LIKE A 90 DAY. SHE STATES THAT IT WAS AUTO-DECLINING ON MY CHART BUT I INFORMED THE PATIENT THAT IT DOES SHOW PENDING.     Does the patient have less than a 3 day supply:  [x] Yes  [] No    Would you like a call back once the refill request has been completed: [x] Yes [] No    If the office needs to give you a call back, can they leave a voicemail: [x] Yes [] No    Brianna Salmeron Rep   02/05/24 09:45 EST

## 2024-02-05 NOTE — TELEPHONE ENCOUNTER
Rx Refill Note  Requested Prescriptions     Pending Prescriptions Disp Refills    Jublia 10 % solution        Last office visit with prescribing clinician: 8/10/2023   Last telemedicine visit with prescribing clinician: Visit date not found   Next office visit with prescribing clinician: Visit date not found                         Would you like a call back once the refill request has been completed: [] Yes [] No    If the office needs to give you a call back, can they leave a voicemail: [] Yes [] No    Romana Rodriguez MA  02/05/24, 09:08 EST

## 2024-02-19 NOTE — ASSESSMENT & PLAN NOTE
Due to her symptoms and previous scans MRI was ordered of her cervical spine.  Patient will be referred to neurosurgery for further evaluation and treatment.

## 2024-02-19 NOTE — ASSESSMENT & PLAN NOTE
Headaches are stable.    Plan:  Continue same medication/s without change.     Discussed medication dosage, use, side effects, and goals of treatment in detail.    Discussed monitoring symptoms and use of quick-relief medications and maintenance medication.    General Treatment Goals:   symptom prevention  minimize work absence  minimizing limitation in activity  prevention of exacerbations  decrease use of ER/inpatient care  minimization of adverse effects of treatment    Followup in 6 months

## 2024-02-25 ENCOUNTER — HOSPITAL ENCOUNTER (OUTPATIENT)
Dept: MRI IMAGING | Facility: HOSPITAL | Age: 34
Discharge: HOME | End: 2024-02-25
Admitting: FAMILY MEDICINE
Payer: COMMERCIAL

## 2024-02-25 DIAGNOSIS — M54.12 CERVICAL RADICULOPATHY AT C7: ICD-10-CM

## 2024-02-25 PROCEDURE — 72141 MRI NECK SPINE W/O DYE: CPT

## 2024-03-11 ENCOUNTER — OFFICE VISIT (OUTPATIENT)
Dept: SPORTS MEDICINE | Facility: CLINIC | Age: 34
End: 2024-03-11
Payer: COMMERCIAL

## 2024-03-11 VITALS
OXYGEN SATURATION: 98 % | WEIGHT: 127 LBS | BODY MASS INDEX: 20.41 KG/M2 | TEMPERATURE: 97.7 F | HEART RATE: 58 BPM | SYSTOLIC BLOOD PRESSURE: 100 MMHG | HEIGHT: 66 IN | DIASTOLIC BLOOD PRESSURE: 66 MMHG

## 2024-03-11 DIAGNOSIS — R20.2 PARESTHESIAS IN RIGHT HAND: Chronic | ICD-10-CM

## 2024-03-11 DIAGNOSIS — M50.123 CERVICAL DISC DISORDER AT C6-C7 LEVEL WITH RADICULOPATHY: Chronic | ICD-10-CM

## 2024-03-11 DIAGNOSIS — M89.8X1 PERISCAPULAR PAIN: Primary | ICD-10-CM

## 2024-03-11 PROCEDURE — 99214 OFFICE O/P EST MOD 30 MIN: CPT | Performed by: FAMILY MEDICINE

## 2024-03-11 RX ORDER — PREDNISONE 20 MG/1
TABLET ORAL
Qty: 6 TABLET | Refills: 0 | Status: SHIPPED | OUTPATIENT
Start: 2024-03-11

## 2024-03-11 NOTE — PROGRESS NOTES
"Chief Complaint  Shoulder Pain (RIGHT SHOULDER- Patient is here for further evaluation of right shoulder, patient states she re injured it on 03/09 while weight lifting.  )    Subjective        Bogdan Garcia presents to [facility]  History of Present Illness  On 3/9/2024 patient was lifting at the gym, had her head extended and felt sudden onset pain along the medial scapular border between the scapula and the thoracic spine.  Patient has been seeing physical therapist for this and getting some moderate relief but is still bothersome in particular with overhead motions.    For quite some time patient has been having episodes of right arm becoming cold with nondermatomal paresthesias.  Will often wake her from sleep at night.  Also occasionally with holding her arms up in front of her or overhead.    Objective   Vital Signs:  /66 (BP Location: Left arm, Patient Position: Sitting, Cuff Size: Adult)   Pulse 58   Temp 97.7 °F (36.5 °C) (Temporal)   Ht 167.6 cm (66\")   Wt 57.6 kg (127 lb)   SpO2 98%   BMI 20.50 kg/m²   Estimated body mass index is 20.5 kg/m² as calculated from the following:    Height as of this encounter: 167.6 cm (66\").    Weight as of this encounter: 57.6 kg (127 lb).       BMI is within normal parameters. No other follow-up for BMI required.      Physical Exam  Vitals reviewed.   Constitutional:       Appearance: She is well-developed.   HENT:      Head: Normocephalic and atraumatic.   Eyes:      Conjunctiva/sclera: Conjunctivae normal.      Pupils: Pupils are equal, round, and reactive to light.   Cardiovascular:      Comments: No peripheral edema  Pulmonary:      Effort: Pulmonary effort is normal.   Musculoskeletal:      Comments: Patient does have some tenderness to palpation along the right middle to inferior periscapular region between the scapula and thoracic spine.      Patient has a positive Dean and Adson's on the right upper extremity. "   Skin:     General: Skin is warm and dry.   Neurological:      Mental Status: She is alert and oriented to person, place, and time.   Psychiatric:         Behavior: Behavior normal.        Result Review :          MRI Cervical Spine Without Contrast (02/25/2024 15:11) - improved from previous. Has pending appt. with neurosurgeon.           Assessment and Plan     Diagnoses and all orders for this visit:    1. Periscapular pain (Primary)  -     predniSONE (DELTASONE) 20 MG tablet; 1 qd for 4 days then 1/2 tablet qd for 4 days  Dispense: 6 tablet; Refill: 0  -     Doppler Arterial Upper Extremity Stress CAR; Future    2. Paresthesias in right hand  -     Doppler Arterial Upper Extremity Stress CAR; Future      1.  I feel that her periscapular pain is either muscular in nature or perhaps remnants of a rib subluxation.  Will treat with prednisone as above and she will continue with physical therapy.  2.  I think given the patient's history and exam it would be prudent to check for thoracic outlet compression.  3.  Patient cervical spine MRI has improved but I do think it is worthwhile to follow-up with her neurosurgeon as scheduled.       Follow Up     No follow-ups on file.  Patient was given instructions and counseling regarding her condition or for health maintenance advice. Please see specific information pulled into the AVS if appropriate.

## 2024-03-25 ENCOUNTER — HOSPITAL ENCOUNTER (OUTPATIENT)
Dept: CARDIOLOGY | Facility: HOSPITAL | Age: 34
Discharge: HOME | End: 2024-03-25
Admitting: FAMILY MEDICINE
Payer: COMMERCIAL

## 2024-03-25 DIAGNOSIS — R20.2 PARESTHESIAS IN RIGHT HAND: Chronic | ICD-10-CM

## 2024-03-25 DIAGNOSIS — M89.8X1 PERISCAPULAR PAIN: ICD-10-CM

## 2024-03-25 LAB
BH CV UPPER ARTERIAL LEFT 2ND DIGIT SYS MAX: 110
BH CV UPPER ARTERIAL LEFT FBI 2ND DIGIT RATIO: 0.97
BH CV UPPER ARTERIAL LEFT WBI RATIO: 0.96
BH CV UPPER ARTERIAL RIGHT 1ST DIGIT SYS MAX: 123
BH CV UPPER ARTERIAL RIGHT 2ND DIGIT SYS MAX: 117
BH CV UPPER ARTERIAL RIGHT FBI 1ST DIGIT RATIO: 1.09
BH CV UPPER ARTERIAL RIGHT FBI 2ND DIGIT RATIO: 1.04
BH CV UPPER ARTERIAL RIGHT WBI RATIO: 1
UPPER ARTERIAL LEFT ARM BRACHIAL SYS MAX: 108
UPPER ARTERIAL LEFT ARM RADIAL SYS MAX: 107
UPPER ARTERIAL LEFT ARM ULNAR SYS MAX: 108
UPPER ARTERIAL RIGHT ARM BRACHIAL SYS MAX: 113
UPPER ARTERIAL RIGHT ARM RADIAL SYS MAX: 113
UPPER ARTERIAL RIGHT ARM ULNAR SYS MAX: 109

## 2024-03-25 PROCEDURE — 93923 UPR/LXTR ART STDY 3+ LVLS: CPT | Performed by: SURGERY

## 2024-03-25 PROCEDURE — 93923 UPR/LXTR ART STDY 3+ LVLS: CPT

## 2024-03-28 DIAGNOSIS — N30.00 ACUTE CYSTITIS WITHOUT HEMATURIA: ICD-10-CM

## 2024-03-28 DIAGNOSIS — F41.0 PANIC DISORDER: ICD-10-CM

## 2024-03-28 DIAGNOSIS — G43.009 ATYPICAL MIGRAINE: ICD-10-CM

## 2024-03-28 DIAGNOSIS — G90.9 DISORDER OF AUTONOMIC NERVOUS SYSTEM: ICD-10-CM

## 2024-03-29 ENCOUNTER — OFFICE VISIT (OUTPATIENT)
Dept: OBSTETRICS AND GYNECOLOGY | Age: 34
End: 2024-03-29
Payer: COMMERCIAL

## 2024-03-29 VITALS
SYSTOLIC BLOOD PRESSURE: 108 MMHG | BODY MASS INDEX: 21.33 KG/M2 | WEIGHT: 128 LBS | HEIGHT: 65 IN | DIASTOLIC BLOOD PRESSURE: 64 MMHG

## 2024-03-29 DIAGNOSIS — N89.8 VAGINAL DISCHARGE: Primary | ICD-10-CM

## 2024-03-29 DIAGNOSIS — R30.0 DYSURIA: ICD-10-CM

## 2024-03-29 LAB
BILIRUB BLD-MCNC: NEGATIVE MG/DL
CLARITY, POC: CLEAR
COLOR UR: YELLOW
GLUCOSE UR STRIP-MCNC: NEGATIVE MG/DL
KETONES UR QL: NEGATIVE
LEUKOCYTE EST, POC: NEGATIVE
NITRITE UR-MCNC: NEGATIVE MG/ML
PH UR: 5.5 [PH] (ref 5–8)
PROT UR STRIP-MCNC: NEGATIVE MG/DL
RBC # UR STRIP: ABNORMAL /UL
SP GR UR: 1.02 (ref 1–1.03)
UROBILINOGEN UR QL: ABNORMAL

## 2024-03-29 RX ORDER — FLUDROCORTISONE ACETATE 0.1 MG/1
0.1 TABLET ORAL DAILY
Qty: 30 TABLET | Refills: 4 | Status: SHIPPED | OUTPATIENT
Start: 2024-03-29

## 2024-03-29 RX ORDER — METRONIDAZOLE 500 MG/1
500 TABLET ORAL 2 TIMES DAILY
Qty: 14 TABLET | Refills: 0 | Status: SHIPPED | OUTPATIENT
Start: 2024-03-29 | End: 2024-04-05

## 2024-03-29 NOTE — TELEPHONE ENCOUNTER
Rx Refill Note  Requested Prescriptions     Pending Prescriptions Disp Refills    fludrocortisone 0.1 MG tablet 30 tablet 4     Sig: Take 1 tablet by mouth Daily.      Last office visit with prescribing clinician: 2/5/2024   Last telemedicine visit with prescribing clinician: Visit date not found   Next office visit with prescribing clinician: Visit date not found                         Would you like a call back once the refill request has been completed: [] Yes [] No    If the office needs to give you a call back, can they leave a voicemail: [] Yes [] No    Lee Alexandra MA  03/29/24, 08:34 EDT

## 2024-03-29 NOTE — PROGRESS NOTES
"Subjective     Chief Complaint   Patient presents with    Gynecologic Exam     Vaginal discharge, odor, cramping, pain when urinating       Bogdan Garcia is a 33 y.o.  whose LMP is Patient's last menstrual period was 2024.     Pt presents today with chief complaint of vaginal discharge, odor and dysuria  She is SA with fiance, getting  a week  Ok to have STD screening      No Additional Complaints Reported    The following portions of the patient's history were reviewed and updated as appropriate:vital signs, allergies, current medications, past medical history, past social history, past surgical history, and problem list      Review of Systems   Pertinent items are noted in HPI.     Objective      /64   Ht 165.1 cm (65\")   Wt 58.1 kg (128 lb)   LMP 2024   BMI 21.30 kg/m²     Physical Exam    General:   alert and no distress   Heart: Not performed today   Lungs: Not performed today.   Breast: Not performed today   Neck: na   Abdomen: {Not performed today   CVA: Not performed today   Pelvis: External genitalia: normal general appearance  Urinary system: urethral meatus normal  Vaginal: normal mucosa without prolapse or lesions, normal without tenderness, induration or masses, normal rugae, and discharge, white  Cervix: normal appearance   Extremities: Not performed today   Neurologic: negative   Psychiatric: Normal affect, judgement, and mood       Lab Review   Labs: UA     Imaging   No data reviewed    Assessment & Plan     ASSESSMENT  1. Vaginal discharge    2. Dysuria        PLAN  1.   Orders Placed This Encounter   Procedures    Urine Culture - Urine, Urine, Clean Catch    NuSwab VG+ - Swab, Vagina    POC Urinalysis Dipstick       2. Medications prescribed this encounter:        New Medications Ordered This Visit   Medications    metroNIDAZOLE (Flagyl) 500 MG tablet     Sig: Take 1 tablet by mouth 2 (Two) Times a Day for 7 days. Do not drink alcohol while taking.     " Dispense:  14 tablet     Refill:  0       3. Urine culture sent. Vaginal swab for STD's, BV and yeast. Will treat for BV.    Follow up: for annual exam    KEILA Ferrari  3/29/2024

## 2024-03-31 LAB
BACTERIA UR CULT: NORMAL
BACTERIA UR CULT: NORMAL

## 2024-04-02 LAB
A VAGINAE DNA VAG QL NAA+PROBE: NORMAL SCORE
BVAB2 DNA VAG QL NAA+PROBE: NORMAL SCORE
C ALBICANS DNA VAG QL NAA+PROBE: NEGATIVE
C GLABRATA DNA VAG QL NAA+PROBE: NEGATIVE
C TRACH DNA VAG QL NAA+PROBE: NEGATIVE
MEGA1 DNA VAG QL NAA+PROBE: NORMAL SCORE
N GONORRHOEA DNA VAG QL NAA+PROBE: NEGATIVE
T VAGINALIS DNA VAG QL NAA+PROBE: NEGATIVE

## 2024-05-20 ENCOUNTER — TELEPHONE (OUTPATIENT)
Dept: SPORTS MEDICINE | Facility: CLINIC | Age: 34
End: 2024-05-20

## 2024-05-20 DIAGNOSIS — S39.012A STRAIN OF LUMBAR REGION, INITIAL ENCOUNTER: Primary | ICD-10-CM

## 2024-05-20 RX ORDER — METHYLPREDNISOLONE 4 MG/1
TABLET ORAL
Qty: 21 TABLET | Refills: 0 | Status: SHIPPED | OUTPATIENT
Start: 2024-05-20

## 2024-05-20 NOTE — TELEPHONE ENCOUNTER
Called patient to let her know that he had sent her in a prescription for steroids.  Patient verbally acknowledged.  No further action is needed.

## 2024-05-20 NOTE — TELEPHONE ENCOUNTER
Caller: MARIE  Relationship to Patient: SELF  Phone Number: [phone] (home)     Reason for Call: PATIENT CALLING STATING THAT SHE TWEAKED HER BACK AT THE GYM THIS MORNING IN THE SAME SPOT AS USUAL. ASKING FOR A STEROID DOSE DOLLY

## 2024-06-28 ENCOUNTER — TELEPHONE (OUTPATIENT)
Dept: SPORTS MEDICINE | Facility: CLINIC | Age: 34
End: 2024-06-28

## 2024-06-28 NOTE — TELEPHONE ENCOUNTER
TC   Detail Level: Detailed Depth Of Biopsy: dermis Was A Bandage Applied: Yes Size Of Lesion In Cm: 1.4 X Size Of Lesion In Cm: 0 Biopsy Type: H and E Biopsy Method: Dermablade Anesthesia Type: 1% lidocaine with epinephrine Anesthesia Volume In Cc: 0.5 Hemostasis: Drysol Wound Care: Petrolatum Dressing: bandage Destruction After The Procedure: No Type Of Destruction Used: Curettage Curettage Text: The wound bed was treated with curettage after the biopsy was performed. Cryotherapy Text: The wound bed was treated with cryotherapy after the biopsy was performed. Electrodesiccation Text: The wound bed was treated with electrodesiccation after the biopsy was performed. Electrodesiccation And Curettage Text: The wound bed was treated with electrodesiccation and curettage after the biopsy was performed. Silver Nitrate Text: The wound bed was treated with silver nitrate after the biopsy was performed. Consent: Written consent was obtained and risks were reviewed including but not limited to scarring, infection, bleeding, scabbing, incomplete removal, nerve damage and allergy to anesthesia. Post-Care Instructions: I reviewed with the patient in detail post-care instructions. Patient is to keep the biopsy site dry overnight, and then apply bacitracin twice daily until healed. Patient may apply hydrogen peroxide soaks to remove any crusting. Notification Instructions: Patient will be notified of biopsy results. However, patient instructed to call the office if not contacted within 2 weeks. Billing Type: Third-Party Bill Information: Selecting Yes will display possible errors in your note based on the variables you have selected. This validation is only offered as a suggestion for you. PLEASE NOTE THAT THE VALIDATION TEXT WILL BE REMOVED WHEN YOU FINALIZE YOUR NOTE. IF YOU WANT TO FAX A PRELIMINARY NOTE YOU WILL NEED TO TOGGLE THIS TO 'NO' IF YOU DO NOT WANT IT IN YOUR FAXED NOTE. Size Of Lesion In Cm: 0.6

## 2024-06-28 NOTE — TELEPHONE ENCOUNTER
Caller: JoseMiltonBogdan A    Relationship to patient: Self    Best call back number:     Patient is needing: MS CONRAD IS HAVING A LOT OF Sacroiliac joint dysfunction of right side PAIN. SHE HAS AN APPT ON 7/8/24 BUT WOULD LIKE TO KNOW IF THERE IS ANYTHING SHE CAN DO TO HELP THE PAIN IN THE MEANTIME

## 2024-07-01 ENCOUNTER — OFFICE VISIT (OUTPATIENT)
Dept: SPORTS MEDICINE | Facility: CLINIC | Age: 34
End: 2024-07-01
Payer: COMMERCIAL

## 2024-07-01 VITALS
HEART RATE: 72 BPM | SYSTOLIC BLOOD PRESSURE: 112 MMHG | RESPIRATION RATE: 16 BRPM | BODY MASS INDEX: 21.33 KG/M2 | TEMPERATURE: 98 F | HEIGHT: 65 IN | OXYGEN SATURATION: 98 % | WEIGHT: 128 LBS | DIASTOLIC BLOOD PRESSURE: 68 MMHG

## 2024-07-01 DIAGNOSIS — M53.3 CHRONIC RIGHT SACROILIAC JOINT PAIN: Chronic | ICD-10-CM

## 2024-07-01 DIAGNOSIS — G90.9 DISORDER OF AUTONOMIC NERVOUS SYSTEM: ICD-10-CM

## 2024-07-01 DIAGNOSIS — G89.29 CHRONIC RIGHT SACROILIAC JOINT PAIN: Chronic | ICD-10-CM

## 2024-07-01 DIAGNOSIS — F41.0 PANIC DISORDER: ICD-10-CM

## 2024-07-01 DIAGNOSIS — G43.009 ATYPICAL MIGRAINE: ICD-10-CM

## 2024-07-01 DIAGNOSIS — M54.41 CHRONIC RIGHT-SIDED LOW BACK PAIN WITH RIGHT-SIDED SCIATICA: Primary | Chronic | ICD-10-CM

## 2024-07-01 DIAGNOSIS — N30.00 ACUTE CYSTITIS WITHOUT HEMATURIA: ICD-10-CM

## 2024-07-01 DIAGNOSIS — G89.29 CHRONIC RIGHT-SIDED LOW BACK PAIN WITH RIGHT-SIDED SCIATICA: Primary | Chronic | ICD-10-CM

## 2024-07-01 PROCEDURE — 99213 OFFICE O/P EST LOW 20 MIN: CPT | Performed by: FAMILY MEDICINE

## 2024-07-01 RX ORDER — FLUDROCORTISONE ACETATE 0.1 MG/1
0.1 TABLET ORAL DAILY
Qty: 30 TABLET | Refills: 4 | OUTPATIENT
Start: 2024-07-01

## 2024-07-01 NOTE — PROGRESS NOTES
"Chief Complaint  Back Pain (Right SI joint pain for several months. )    Subjective        Bogdan Garcia presents to [facility]  Back Pain      For over the past year patient has been having recurring right lower back SI joint pain.  Patient has had several visits with physical therapy, OMT and dry needling.  Not seen any lasting relief.  Pain can be severe and sharp at times.  Some radiation to the right buttock.  No alarm symptoms.  And has received IM injections of corticosteroid which have been helpful in the past but no intra-articular SI joint injection.  Objective   Vital Signs:  /68 (BP Location: Left arm, Patient Position: Sitting, Cuff Size: Adult)   Pulse 72   Temp 98 °F (36.7 °C)   Resp 16   Ht 165.1 cm (65\")   Wt 58.1 kg (128 lb)   SpO2 98%   BMI 21.30 kg/m²   Estimated body mass index is 21.3 kg/m² as calculated from the following:    Height as of this encounter: 165.1 cm (65\").    Weight as of this encounter: 58.1 kg (128 lb).       BMI is within normal parameters. No other follow-up for BMI required.      Physical Exam  Vitals reviewed.   Constitutional:       Appearance: She is well-developed.   HENT:      Head: Normocephalic and atraumatic.   Eyes:      Conjunctiva/sclera: Conjunctivae normal.      Pupils: Pupils are equal, round, and reactive to light.   Cardiovascular:      Comments: No peripheral edema  Pulmonary:      Effort: Pulmonary effort is normal.   Musculoskeletal:      Comments: Mild tenderness to palpation of the superior portion of the right SI joint but also in the right side of the lower lumbar spine.  Negative straight leg raise.  Her right hemipelvis does seem to have mild posterior tilt when compared to the left.   Skin:     General: Skin is warm and dry.   Neurological:      Mental Status: She is alert and oriented to person, place, and time.   Psychiatric:         Behavior: Behavior normal.        Result Review :                "      Assessment and Plan     Diagnoses and all orders for this visit:    1. Chronic right-sided low back pain with right-sided sciatica (Primary)  -     MRI Lumbar Spine Without Contrast; Future    2. Chronic right sacroiliac joint pain  -     MRI Pelvis Without Contrast; Future      Follow-up after MRIs.       Follow Up     No follow-ups on file.  Patient was given instructions and counseling regarding her condition or for health maintenance advice. Please see specific information pulled into the AVS if appropriate.

## 2024-07-02 RX ORDER — FLUDROCORTISONE ACETATE 0.1 MG/1
0.1 TABLET ORAL DAILY
Qty: 30 TABLET | Refills: 4 | Status: SHIPPED | OUTPATIENT
Start: 2024-07-02

## 2024-07-02 NOTE — TELEPHONE ENCOUNTER
Rx Refill Note  Requested Prescriptions     Pending Prescriptions Disp Refills    fludrocortisone 0.1 MG tablet 30 tablet 4     Sig: Take 1 tablet by mouth Daily.      Last office visit with prescribing clinician: 2/5/2024   Last telemedicine visit with prescribing clinician: Visit date not found   Next office visit with prescribing clinician: Visit date not found                         Would you like a call back once the refill request has been completed: [] Yes [] No    If the office needs to give you a call back, can they leave a voicemail: [] Yes [] No    Lee Alexandra MA  07/02/24, 08:21 EDT

## 2024-07-03 ENCOUNTER — TELEPHONE (OUTPATIENT)
Dept: OBSTETRICS AND GYNECOLOGY | Age: 34
End: 2024-07-03
Payer: COMMERCIAL

## 2024-07-03 NOTE — TELEPHONE ENCOUNTER
Provider: KEILA MATTHEWS     Caller: MARIE CONRAD     Relationship to Patient: SELF       Reason for Call: PT SCHEDULED APPT TO FOLLOW UP ON CYST FOUND ON MRI - MRI IS IN CHART 7/2/24 - U/S MAY BE NEEDED       
US scheduled  
Abdominal Pain, N/V/D

## 2024-07-09 ENCOUNTER — OFFICE VISIT (OUTPATIENT)
Dept: OBSTETRICS AND GYNECOLOGY | Age: 34
End: 2024-07-09
Payer: COMMERCIAL

## 2024-07-09 VITALS
HEIGHT: 65 IN | BODY MASS INDEX: 21.33 KG/M2 | WEIGHT: 128 LBS | DIASTOLIC BLOOD PRESSURE: 72 MMHG | SYSTOLIC BLOOD PRESSURE: 110 MMHG

## 2024-07-09 DIAGNOSIS — N89.8 VAGINAL ODOR: ICD-10-CM

## 2024-07-09 DIAGNOSIS — G96.191 TARLOV CYST: Primary | ICD-10-CM

## 2024-07-09 DIAGNOSIS — R39.15 URINARY URGENCY: ICD-10-CM

## 2024-07-09 DIAGNOSIS — R35.0 URINARY FREQUENCY: ICD-10-CM

## 2024-07-09 DIAGNOSIS — M53.3 CHRONIC RIGHT SACROILIAC JOINT PAIN: ICD-10-CM

## 2024-07-09 DIAGNOSIS — G89.29 CHRONIC RIGHT SACROILIAC JOINT PAIN: ICD-10-CM

## 2024-07-09 DIAGNOSIS — K64.4 EXTERNAL HEMORRHOIDS: ICD-10-CM

## 2024-07-09 DIAGNOSIS — R10.2 PELVIC PRESSURE IN FEMALE: Primary | ICD-10-CM

## 2024-07-09 LAB
BILIRUB BLD-MCNC: NEGATIVE MG/DL
CLARITY, POC: CLEAR
COLOR UR: YELLOW
GLUCOSE UR STRIP-MCNC: NEGATIVE MG/DL
KETONES UR QL: NEGATIVE
LEUKOCYTE EST, POC: NEGATIVE
NITRITE UR-MCNC: NEGATIVE MG/ML
PH UR: 5.5 [PH] (ref 5–8)
PROT UR STRIP-MCNC: NEGATIVE MG/DL
RBC # UR STRIP: ABNORMAL /UL
SP GR UR: 1.02 (ref 1–1.03)
UROBILINOGEN UR QL: NORMAL

## 2024-07-09 RX ORDER — HYDROCORTISONE 25 MG/G
CREAM TOPICAL
Qty: 30 G | Refills: 0 | Status: SHIPPED | OUTPATIENT
Start: 2024-07-09 | End: 2025-07-09

## 2024-07-09 RX ORDER — METRONIDAZOLE 500 MG/1
500 TABLET ORAL 2 TIMES DAILY
Qty: 14 TABLET | Refills: 0 | Status: SHIPPED | OUTPATIENT
Start: 2024-07-09 | End: 2024-07-16

## 2024-07-09 NOTE — PROGRESS NOTES
"Subjective     Chief Complaint   Patient presents with    Follow-up     Follow up from MRI on 24, TVUS today, pt states she is having pelvic pressure, vaginal odor, and urinary frequency and urgency       Bogdan Garcia is a 34 y.o.  whose LMP is No LMP recorded. presents with follow up on MRI she was told she had a cyst  Has had chronic back pain mainly in the sacral area  In PT and having dry needling for back pain  She is having urinary frequency, urgency vaginal odor and pelvic pressure this started in the past week  No vaginal discharge   Regular periods reports she is due any day  She is having trouble gaining muscle started creatinine  Wondering about gut health has eliminated dairy       The following portions of the patient's history were reviewed and updated as appropriate:vital signs, allergies, current medications, past medical history, past social history, past surgical history, and problem list      Review of Systems   Pertinent items are noted in HPI.     Objective      /72   Ht 165.1 cm (65\")   Wt 58.1 kg (128 lb)   BMI 21.30 kg/m²     Physical Exam    General:   alert   Heart: Not performed today   Lungs: Not performed today.   Breast: Not performed today   Neck: Not performed today   Abdomen: Not performed today   CVA: absent   Pelvis: Vulva and vagina appear normal. Bimanual exam reveals normal uterus and adnexa. No CMT  Vaginal: discharge, white   Extremities: Extremities normal, atraumatic, no cyanosis or edema   Neurologic: AOx3. Gait normal. Reflexes and motor strength normal and symmetric. Cranial nerves 2-12 and sensation grossly intact.   Psychiatric: Normal affect, judgement, and mood       Lab Review   Labs: POC Urinalysis Dipstick (2024 10:26)     Imaging   US Non-ob Transvaginal (2024 09:58)     Assessment & Plan     ASSESSMENT  1. Pelvic pressure in female    2. Urinary frequency    3. Urinary urgency    4. Vaginal odor    5. External hemorrhoids  "         PLAN  1.   Orders Placed This Encounter   Procedures    NuSwab VG+ - Swab, Vagina    POC Urinalysis Dipstick       2. Medications prescribed this encounter:        New Medications Ordered This Visit   Medications    Hydrocortisone, Perianal, (Anusol-HC) 2.5 % rectal cream     Sig: Apply small amount rectally 2 times daily for 5 days     Dispense:  30 g     Refill:  0    metroNIDAZOLE (Flagyl) 500 MG tablet     Sig: Take 1 tablet by mouth 2 (Two) Times a Day for 7 days.     Dispense:  14 tablet     Refill:  0       3. TVUS normal  UA with mild hematuria will send culture  Treated for bv based on s/s  Anusol for hemorrhoids   Will call with nuab results  All questions answered and addressed  Could try probiotic and elimination diet    Follow up: as needed    Cindy Valverde, APRN  7/9/2024

## 2024-07-10 ENCOUNTER — TELEPHONE (OUTPATIENT)
Dept: OBSTETRICS AND GYNECOLOGY | Age: 34
End: 2024-07-10
Payer: COMMERCIAL

## 2024-07-11 ENCOUNTER — TELEPHONE (OUTPATIENT)
Dept: OBSTETRICS AND GYNECOLOGY | Age: 34
End: 2024-07-11
Payer: COMMERCIAL

## 2024-07-11 DIAGNOSIS — F41.0 PANIC DISORDER: ICD-10-CM

## 2024-07-11 DIAGNOSIS — N30.00 ACUTE CYSTITIS WITHOUT HEMATURIA: ICD-10-CM

## 2024-07-11 DIAGNOSIS — G90.9 DISORDER OF AUTONOMIC NERVOUS SYSTEM: ICD-10-CM

## 2024-07-11 DIAGNOSIS — G43.009 ATYPICAL MIGRAINE: ICD-10-CM

## 2024-07-11 LAB
A VAGINAE DNA VAG QL NAA+PROBE: NORMAL SCORE
BACTERIA UR CULT: NORMAL
BACTERIA UR CULT: NORMAL
BVAB2 DNA VAG QL NAA+PROBE: NORMAL SCORE
C ALBICANS DNA VAG QL NAA+PROBE: NEGATIVE
C GLABRATA DNA VAG QL NAA+PROBE: NEGATIVE
C TRACH DNA SPEC QL NAA+PROBE: NEGATIVE
MEGA1 DNA VAG QL NAA+PROBE: NORMAL SCORE
N GONORRHOEA DNA VAG QL NAA+PROBE: NEGATIVE
T VAGINALIS DNA VAG QL NAA+PROBE: NEGATIVE

## 2024-07-11 RX ORDER — FLUDROCORTISONE ACETATE 0.1 MG/1
0.1 TABLET ORAL DAILY
Qty: 30 TABLET | Refills: 4 | Status: SHIPPED | OUTPATIENT
Start: 2024-07-11

## 2024-07-11 NOTE — TELEPHONE ENCOUNTER
Caller: JoseBogdan    Relationship: Self    Best call back number: [phone]    Requested Prescriptions:   Requested Prescriptions     Pending Prescriptions Disp Refills    fludrocortisone 0.1 MG tablet 30 tablet 4     Sig: Take 1 tablet by mouth Daily.        Pharmacy where request should be sent: [id number] [address] AT [facility] [phone] [facility] [phone]      Last office visit with prescribing clinician: 2/5/2024   Last telemedicine visit with prescribing clinician: Visit date not found   Next office visit with prescribing clinician: Visit date not found     Additional details provided by patient: PATIENT STATED THAT SHE IS OUT OF TOWN AND NEES A FIVE DAY PRESCRIPTION SENT TO THIS PHARMACY     Does the patient have less than a 3 day supply:  [x] Yes  [] No    Would you like a call back once the refill request has been completed: [] Yes [x] No    If the office needs to give you a call back, can they leave a voicemail: [] Yes [x] No    Brianna Smith Rep   07/11/24 11:30 EDT

## 2024-08-02 DIAGNOSIS — F41.0 PANIC DISORDER: ICD-10-CM

## 2024-08-02 DIAGNOSIS — N30.00 ACUTE CYSTITIS WITHOUT HEMATURIA: ICD-10-CM

## 2024-08-02 DIAGNOSIS — G90.9 DISORDER OF AUTONOMIC NERVOUS SYSTEM: ICD-10-CM

## 2024-08-02 DIAGNOSIS — G43.009 ATYPICAL MIGRAINE: ICD-10-CM

## 2024-08-02 RX ORDER — FLUDROCORTISONE ACETATE 0.1 MG/1
0.1 TABLET ORAL DAILY
Qty: 30 TABLET | Refills: 4 | OUTPATIENT
Start: 2024-08-02

## 2024-08-02 NOTE — TELEPHONE ENCOUNTER
Rx Refill Note  Requested Prescriptions     Pending Prescriptions Disp Refills    fludrocortisone 0.1 MG tablet 30 tablet 4     Sig: Take 1 tablet by mouth Daily.      Last office visit with prescribing clinician: 2/5/2024   Last telemedicine visit with prescribing clinician: Visit date not found   Next office visit with prescribing clinician: Visit date not found                         Would you like a call back once the refill request has been completed: [] Yes [] No    If the office needs to give you a call back, can they leave a voicemail: [] Yes [] No    Lee Alexandra MA  08/02/24, 08:08 EDT

## 2024-08-12 ENCOUNTER — TELEPHONE (OUTPATIENT)
Dept: FAMILY MEDICINE CLINIC | Facility: CLINIC | Age: 34
End: 2024-08-12
Payer: COMMERCIAL

## 2024-08-12 ENCOUNTER — OFFICE VISIT (OUTPATIENT)
Dept: FAMILY MEDICINE CLINIC | Facility: CLINIC | Age: 34
End: 2024-08-12
Payer: COMMERCIAL

## 2024-08-12 ENCOUNTER — APPOINTMENT (OUTPATIENT)
Dept: CT IMAGING | Facility: HOSPITAL | Age: 34
End: 2024-08-12
Payer: COMMERCIAL

## 2024-08-12 ENCOUNTER — APPOINTMENT (OUTPATIENT)
Dept: GENERAL RADIOLOGY | Facility: HOSPITAL | Age: 34
End: 2024-08-12
Payer: COMMERCIAL

## 2024-08-12 ENCOUNTER — HOSPITAL ENCOUNTER (EMERGENCY)
Facility: HOSPITAL | Age: 34
Discharge: HOME | End: 2024-08-12
Attending: EMERGENCY MEDICINE | Admitting: EMERGENCY MEDICINE
Payer: COMMERCIAL

## 2024-08-12 VITALS
HEIGHT: 66 IN | RESPIRATION RATE: 17 BRPM | OXYGEN SATURATION: 100 % | DIASTOLIC BLOOD PRESSURE: 70 MMHG | TEMPERATURE: 99 F | WEIGHT: 125 LBS | BODY MASS INDEX: 20.09 KG/M2 | HEART RATE: 66 BPM | SYSTOLIC BLOOD PRESSURE: 102 MMHG

## 2024-08-12 VITALS
DIASTOLIC BLOOD PRESSURE: 60 MMHG | HEART RATE: 70 BPM | TEMPERATURE: 98.4 F | WEIGHT: 125.8 LBS | BODY MASS INDEX: 20.93 KG/M2 | OXYGEN SATURATION: 98 % | SYSTOLIC BLOOD PRESSURE: 112 MMHG

## 2024-08-12 DIAGNOSIS — R14.0 ABDOMINAL BLOATING: ICD-10-CM

## 2024-08-12 DIAGNOSIS — R42 DIZZINESS: ICD-10-CM

## 2024-08-12 DIAGNOSIS — R42 EPISODIC LIGHTHEADEDNESS: Primary | ICD-10-CM

## 2024-08-12 DIAGNOSIS — R61 NIGHT SWEATS: ICD-10-CM

## 2024-08-12 DIAGNOSIS — R11.0 NAUSEA: ICD-10-CM

## 2024-08-12 DIAGNOSIS — R10.9 ABDOMINAL PAIN, UNSPECIFIED ABDOMINAL LOCATION: ICD-10-CM

## 2024-08-12 DIAGNOSIS — R19.7 DIARRHEA, UNSPECIFIED TYPE: ICD-10-CM

## 2024-08-12 DIAGNOSIS — H61.23 BILATERAL IMPACTED CERUMEN: ICD-10-CM

## 2024-08-12 DIAGNOSIS — R19.7 DIARRHEA, UNSPECIFIED TYPE: Primary | ICD-10-CM

## 2024-08-12 LAB
ALBUMIN SERPL-MCNC: 4.3 G/DL (ref 3.5–5.2)
ALBUMIN/GLOB SERPL: 1.8 G/DL
ALP SERPL-CCNC: 58 U/L (ref 39–117)
ALT SERPL W P-5'-P-CCNC: 13 U/L (ref 1–33)
ANION GAP SERPL CALCULATED.3IONS-SCNC: 8 MMOL/L (ref 5–15)
AST SERPL-CCNC: 25 U/L (ref 1–32)
B-HCG UR QL: NEGATIVE
BASOPHILS # BLD AUTO: 0.01 10*3/MM3 (ref 0–0.2)
BASOPHILS NFR BLD AUTO: 0.2 % (ref 0–1.5)
BILIRUB SERPL-MCNC: 0.3 MG/DL (ref 0–1.2)
BILIRUB UR QL STRIP: NEGATIVE
BUN SERPL-MCNC: 15 MG/DL (ref 6–20)
BUN/CREAT SERPL: 15.6 (ref 7–25)
CALCIUM SPEC-SCNC: 8.6 MG/DL (ref 8.6–10.5)
CHLORIDE SERPL-SCNC: 104 MMOL/L (ref 98–107)
CLARITY UR: CLEAR
CO2 SERPL-SCNC: 27 MMOL/L (ref 22–29)
COLOR UR: YELLOW
CREAT SERPL-MCNC: 0.96 MG/DL (ref 0.57–1)
DEPRECATED RDW RBC AUTO: 40 FL (ref 37–54)
EGFRCR SERPLBLD CKD-EPI 2021: 79.8 ML/MIN/1.73
EOSINOPHIL # BLD AUTO: 0.11 10*3/MM3 (ref 0–0.4)
EOSINOPHIL NFR BLD AUTO: 2.5 % (ref 0.3–6.2)
ERYTHROCYTE [DISTWIDTH] IN BLOOD BY AUTOMATED COUNT: 12 % (ref 12.3–15.4)
FLUAV SUBTYP SPEC NAA+PROBE: NOT DETECTED
FLUBV RNA ISLT QL NAA+PROBE: NOT DETECTED
GLOBULIN UR ELPH-MCNC: 2.4 GM/DL
GLUCOSE SERPL-MCNC: 92 MG/DL (ref 65–99)
GLUCOSE UR STRIP-MCNC: NEGATIVE MG/DL
HCT VFR BLD AUTO: 40.8 % (ref 34–46.6)
HGB BLD-MCNC: 14 G/DL (ref 12–15.9)
HGB UR QL STRIP.AUTO: ABNORMAL
IMM GRANULOCYTES # BLD AUTO: 0.01 10*3/MM3 (ref 0–0.05)
IMM GRANULOCYTES NFR BLD AUTO: 0.2 % (ref 0–0.5)
KETONES UR QL STRIP: NEGATIVE
LEUKOCYTE ESTERASE UR QL STRIP.AUTO: NEGATIVE
LIPASE SERPL-CCNC: 65 U/L (ref 13–60)
LYMPHOCYTES # BLD AUTO: 1.2 10*3/MM3 (ref 0.7–3.1)
LYMPHOCYTES NFR BLD AUTO: 27.2 % (ref 19.6–45.3)
MAGNESIUM SERPL-MCNC: 2.3 MG/DL (ref 1.6–2.6)
MCH RBC QN AUTO: 30.8 PG (ref 26.6–33)
MCHC RBC AUTO-ENTMCNC: 34.3 G/DL (ref 31.5–35.7)
MCV RBC AUTO: 89.9 FL (ref 79–97)
MONOCYTES # BLD AUTO: 0.35 10*3/MM3 (ref 0.1–0.9)
MONOCYTES NFR BLD AUTO: 7.9 % (ref 5–12)
NEUTROPHILS NFR BLD AUTO: 2.73 10*3/MM3 (ref 1.7–7)
NEUTROPHILS NFR BLD AUTO: 62 % (ref 42.7–76)
NITRITE UR QL STRIP: NEGATIVE
PH UR STRIP.AUTO: 5.5 [PH] (ref 5–8)
PLATELET # BLD AUTO: 144 10*3/MM3 (ref 140–450)
PMV BLD AUTO: 10.3 FL (ref 6–12)
POTASSIUM SERPL-SCNC: 3.4 MMOL/L (ref 3.5–5.2)
PROT SERPL-MCNC: 6.7 G/DL (ref 6–8.5)
PROT UR QL STRIP: NEGATIVE
QT INTERVAL: 395 MS
QTC INTERVAL: 408 MS
RBC # BLD AUTO: 4.54 10*6/MM3 (ref 3.77–5.28)
SARS-COV-2 RNA RESP QL NAA+PROBE: NOT DETECTED
SODIUM SERPL-SCNC: 139 MMOL/L (ref 136–145)
SP GR UR STRIP: 1.02 (ref 1–1.03)
STREP A PCR: NOT DETECTED
UROBILINOGEN UR QL STRIP: ABNORMAL
WBC NRBC COR # BLD AUTO: 4.41 10*3/MM3 (ref 3.4–10.8)

## 2024-08-12 PROCEDURE — 25510000001 IOPAMIDOL PER 1 ML: Performed by: EMERGENCY MEDICINE

## 2024-08-12 PROCEDURE — 81003 URINALYSIS AUTO W/O SCOPE: CPT | Performed by: NURSE PRACTITIONER

## 2024-08-12 PROCEDURE — 74177 CT ABD & PELVIS W/CONTRAST: CPT

## 2024-08-12 PROCEDURE — 25010000002 ONDANSETRON PER 1 MG: Performed by: NURSE PRACTITIONER

## 2024-08-12 PROCEDURE — 85025 COMPLETE CBC W/AUTO DIFF WBC: CPT | Performed by: NURSE PRACTITIONER

## 2024-08-12 PROCEDURE — 99283 EMERGENCY DEPT VISIT LOW MDM: CPT | Performed by: NURSE PRACTITIONER

## 2024-08-12 PROCEDURE — 83735 ASSAY OF MAGNESIUM: CPT | Performed by: NURSE PRACTITIONER

## 2024-08-12 PROCEDURE — 83690 ASSAY OF LIPASE: CPT | Performed by: NURSE PRACTITIONER

## 2024-08-12 PROCEDURE — 80053 COMPREHEN METABOLIC PANEL: CPT | Performed by: NURSE PRACTITIONER

## 2024-08-12 PROCEDURE — 96374 THER/PROPH/DIAG INJ IV PUSH: CPT

## 2024-08-12 PROCEDURE — 87651 STREP A DNA AMP PROBE: CPT | Performed by: NURSE PRACTITIONER

## 2024-08-12 PROCEDURE — 81025 URINE PREGNANCY TEST: CPT | Performed by: NURSE PRACTITIONER

## 2024-08-12 PROCEDURE — 93005 ELECTROCARDIOGRAM TRACING: CPT | Performed by: NURSE PRACTITIONER

## 2024-08-12 PROCEDURE — 25810000003 SODIUM CHLORIDE 0.9 % SOLUTION: Performed by: NURSE PRACTITIONER

## 2024-08-12 PROCEDURE — 99285 EMERGENCY DEPT VISIT HI MDM: CPT

## 2024-08-12 PROCEDURE — 99214 OFFICE O/P EST MOD 30 MIN: CPT | Performed by: INTERNAL MEDICINE

## 2024-08-12 PROCEDURE — 93010 ELECTROCARDIOGRAM REPORT: CPT | Performed by: INTERNAL MEDICINE

## 2024-08-12 PROCEDURE — 36415 COLL VENOUS BLD VENIPUNCTURE: CPT

## 2024-08-12 PROCEDURE — 87636 SARSCOV2 & INF A&B AMP PRB: CPT | Performed by: NURSE PRACTITIONER

## 2024-08-12 PROCEDURE — 71045 X-RAY EXAM CHEST 1 VIEW: CPT

## 2024-08-12 RX ORDER — ONDANSETRON 2 MG/ML
4 INJECTION INTRAMUSCULAR; INTRAVENOUS ONCE
Status: COMPLETED | OUTPATIENT
Start: 2024-08-12 | End: 2024-08-12

## 2024-08-12 RX ORDER — SODIUM CHLORIDE 0.9 % (FLUSH) 0.9 %
10 SYRINGE (ML) INJECTION AS NEEDED
Status: DISCONTINUED | OUTPATIENT
Start: 2024-08-12 | End: 2024-08-12 | Stop reason: HOSPADM

## 2024-08-12 RX ORDER — HYDROXYZINE HYDROCHLORIDE 25 MG/1
25 TABLET, FILM COATED ORAL EVERY 6 HOURS PRN
Qty: 20 TABLET | Refills: 0 | Status: SHIPPED | OUTPATIENT
Start: 2024-08-12 | End: 2024-08-15

## 2024-08-12 RX ADMIN — ONDANSETRON 4 MG: 2 INJECTION, SOLUTION INTRAMUSCULAR; INTRAVENOUS at 17:25

## 2024-08-12 RX ADMIN — SODIUM CHLORIDE 1000 ML: 9 INJECTION, SOLUTION INTRAVENOUS at 17:25

## 2024-08-12 RX ADMIN — IOPAMIDOL 85 ML: 755 INJECTION, SOLUTION INTRAVENOUS at 18:28

## 2024-08-12 NOTE — FSED PROVIDER NOTE
"        EMERGENCY DEPARTMENT ENCOUNTER    Room Number:  04/04  Date seen:  8/12/2024  Time seen: 17:03 EDT  PCP: Boston Otto Sr., MD  Historian: patient    Discussed/obtained information from independent historians: n/a    HPI:  Chief complaint:diarrhea, light headed spells  A complete HPI/ROS/PMH/PSH/SH/FH are unobtainable due to:   Context:Bogdan Garcia is a 34 y.o. female with  history of atypical migraine, who presents to the ED with c/o 2 weeks of moderate diarrhea, abdominal cramping and bloating,  night sweats, very dry mouth despite drinking a lot of water and an increase in her spells of extreme lightheadedness that cause her to feel \"out of it\".  Symptoms started after starting a \"greens\" supplement but she doesn't think they are related. She does endorse some increased stress and anxiety.  She states she has h/o tilt table test in past for her lightheadedness.  She does take     External (non-ED) record review:  Pt had nuswab @ OB/GYN 7/9/24 which was negative.     Chronic or social conditions impacting care:    ALLERGIES  Monistat [miconazole]    PAST MEDICAL HISTORY  Active Ambulatory Problems     Diagnosis Date Noted    Atypical migraine 04/27/2016    Disorder of autonomic nervous system 04/27/2016    Acute appendicitis with localized peritonitis, without perforation, abscess, or gangrene 11/12/2019    Anxiety 06/26/2020    Family history of ovarian cancer 01/17/2022    Cervical disc herniation 08/17/2022    Cervical radiculopathy at C7 08/17/2022    Nail disorder, unspecified 08/24/2023    Panic disorder 08/24/2023     Resolved Ambulatory Problems     Diagnosis Date Noted    No Resolved Ambulatory Problems     Past Medical History:   Diagnosis Date    Asthma, exercise induced     Injury of back     Injury of neck     Migraine     Shoulder injury     Vaginal delivery        PAST SURGICAL HISTORY  Past Surgical History:   Procedure Laterality Date    APPENDECTOMY N/A 11/12/2019    Procedure: " APPENDECTOMY LAPAROSCOPIC;  Surgeon: Lorenzo Pena MD;  Location: [facility] MAIN OR;  Service: General    COLONOSCOPY N/A 01/30/2009    Normal-Dr. Jason Vivas    EPIDURAL N/A 09/20/2022    Procedure: CERVICAL EPIDURAL STEROID INJECTION;  Surgeon: Bernabe Davidson MD;  Location: [facility] MAIN OR;  Service: Pain Management;  Laterality: N/A;       FAMILY HISTORY  Family History   Problem Relation Age of Onset    Diabetes Other     Heart disease Other     Hypertension Other     Other Mother         Phlebitis    Ovarian cancer Maternal Grandmother 50    Diabetes Maternal Grandfather     Coronary artery disease Maternal Grandfather     Heart attack Maternal Grandfather     Stroke Cousin     No Known Problems Son     Drug abuse Father     No Known Problems Sister     Lung cancer Paternal Grandmother     No Known Problems Paternal Grandfather        SOCIAL HISTORY  Social History     Socioeconomic History    Marital status: Single   Tobacco Use    Smoking status: Former     Current packs/day: 0.50     Average packs/day: 0.5 packs/day for 9.6 years (4.8 ttl pk-yrs)     Types: Cigarettes, Cigars     Start date: 1/1/2015    Smokeless tobacco: Former   Vaping Use    Vaping status: Never Used   Substance and Sexual Activity    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     PHYSICAL EXAM    I have reviewed the triage vital signs and nursing notes.  Vitals:    08/12/24 1708   BP:    Pulse: 66   Resp:    Temp:    SpO2: 100%     Physical Exam    GENERAL: not distressed  HENT: nares patent, mm moist.  No tonsillar erythema, edema,exudates.  TM's with bilateral cerumen impaction  EYES: no scleral icterus, PERRL, EOMI, no nystagmus  NECK: no ROM limitations  CV: regular rhythm, regular rate, no  murmur  RESPIRATORY: normal effort, CTAB  ABDOMEN: soft, no focal tenderness.   : deferred  MUSCULOSKELETAL: no deformity  NEURO: alert, moves all extremities, follows commands  SKIN: warm, dry    LAB RESULTS  Recent Results (from the past 24 hour(s))   Pregnancy, Urine - Urine, Clean Catch    Collection Time: 08/12/24  5:04 PM    Specimen: Urine, Clean Catch   Result Value Ref Range    HCG, Urine QL Negative Negative   Urinalysis without microscopic (no culture) - Urine, Clean Catch    Collection Time: 08/12/24  5:04 PM    Specimen: Urine, Clean Catch   Result Value Ref Range    Color, UA Yellow Yellow, Straw    Appearance, UA Clear Clear    pH, UA 5.5 5.0 - 8.0    Specific Gravity, UA 1.025 1.005 - 1.030    Glucose, UA Negative Negative    Ketones, UA Negative Negative    Bilirubin, UA Negative Negative    Blood, UA Trace (A) Negative    Protein, UA Negative Negative    Leuk Esterase, UA Negative Negative    Nitrite, UA Negative Negative    Urobilinogen, UA 0.2 E.U./dL 0.2 - 1.0 E.U./dL   Comprehensive Metabolic Panel    Collection Time: 08/12/24  5:17 PM    Specimen: Blood   Result Value Ref Range    Glucose 92 65 - 99 mg/dL    BUN 15 6 - 20 mg/dL    Creatinine 0.96 0.57 - 1.00 mg/dL    Sodium 139 136 - 145 mmol/L    Potassium 3.4 (L) 3.5 - 5.2 mmol/L    Chloride 104 98 - 107 mmol/L    CO2 27.0 22.0 - 29.0 mmol/L    Calcium 8.6 8.6 - 10.5 mg/dL    Total Protein 6.7 6.0 - 8.5 g/dL    Albumin 4.3 3.5 - 5.2 g/dL    ALT (SGPT) 13 1 - 33 U/L    AST (SGOT) 25 1 - 32 U/L    Alkaline Phosphatase 58 39 - 117 U/L    Total Bilirubin 0.3 0.0 - 1.2 mg/dL    Globulin 2.4 gm/dL    A/G Ratio 1.8 g/dL    BUN/Creatinine Ratio 15.6 7.0 - 25.0    Anion Gap 8.0 5.0 - 15.0 mmol/L    eGFR 79.8 >60.0 mL/min/1.73   Lipase    Collection Time: 08/12/24  5:17 PM    Specimen: Blood   Result Value Ref Range    Lipase 65 (H) 13 - 60 U/L   CBC Auto Differential    Collection Time: 08/12/24  5:17 PM    Specimen: Blood   Result Value Ref Range     WBC 4.41 3.40 - 10.80 10*3/mm3    RBC 4.54 3.77 - 5.28 10*6/mm3    Hemoglobin 14.0 12.0 - 15.9 g/dL    Hematocrit 40.8 34.0 - 46.6 %    MCV 89.9 79.0 - 97.0 fL    MCH 30.8 26.6 - 33.0 pg    MCHC 34.3 31.5 - 35.7 g/dL    RDW 12.0 (L) 12.3 - 15.4 %    RDW-SD 40.0 37.0 - 54.0 fl    MPV 10.3 6.0 - 12.0 fL    Platelets 144 140 - 450 10*3/mm3    Neutrophil % 62.0 42.7 - 76.0 %    Lymphocyte % 27.2 19.6 - 45.3 %    Monocyte % 7.9 5.0 - 12.0 %    Eosinophil % 2.5 0.3 - 6.2 %    Basophil % 0.2 0.0 - 1.5 %    Immature Grans % 0.2 0.0 - 0.5 %    Neutrophils, Absolute 2.73 1.70 - 7.00 10*3/mm3    Lymphocytes, Absolute 1.20 0.70 - 3.10 10*3/mm3    Monocytes, Absolute 0.35 0.10 - 0.90 10*3/mm3    Eosinophils, Absolute 0.11 0.00 - 0.40 10*3/mm3    Basophils, Absolute 0.01 0.00 - 0.20 10*3/mm3    Immature Grans, Absolute 0.01 0.00 - 0.05 10*3/mm3   Rapid Strep A Screen - Swab, Throat    Collection Time: 08/12/24  5:17 PM    Specimen: Throat; Swab   Result Value Ref Range    STREP A PCR Not Detected Not Detected   COVID-19 and FLU A/B PCR, 1 HR TAT - Swab, Nasopharynx    Collection Time: 08/12/24  5:17 PM    Specimen: Nasopharynx; Swab   Result Value Ref Range    COVID19 Not Detected Not Detected - Ref. Range    Influenza A PCR Not Detected Not Detected    Influenza B PCR Not Detected Not Detected   Magnesium    Collection Time: 08/12/24  5:17 PM    Specimen: Blood   Result Value Ref Range    Magnesium 2.3 1.6 - 2.6 mg/dL   ECG 12 Lead Electrolyte Imbalance    Collection Time: 08/12/24  5:37 PM   Result Value Ref Range    QT Interval 395 ms    QTC Interval 408 ms       Ordered the above labs and independently interpreted results.  My findings will be discussed in the ED course or medical decision making section below    RADIOLOGY RESULTS  XR Chest 1 View    Result Date: 8/12/2024  CHEST SINGLE VIEW  HISTORY: Diarrhea. Abdominal cramping and bloating.  COMPARISON: CT abdomen and pelvis same date 08/12/2024, AP chest 01/13/2023.   FINDINGS: Cardiomediastinal silhouette within normal limits. Lungs appear clear and there is no evidence for pulmonary edema or pleural effusion or infiltrate.      No evidence for active disease in the chest.  This report was finalized on 8/12/2024 7:14 PM by Rodrigo Crowley M.D on Workstation: [id number]      CT Abdomen Pelvis With Contrast    Result Date: 8/12/2024  CT ABDOMEN AND PELVIS WITH IV CONTRAST  HISTORY: 34-year-old female with abdominal pain and bloating. Night sweats. Severe diarrhea. Appendectomy in the past.  TECHNIQUE: Radiation dose reduction techniques were utilized, including automated exposure control and exposure modulation based on body size. 3 mm images were obtained through the abdomen and pelvis after the administration of IV contrast. Compared with previous CT 11/12/2019.  FINDINGS: 1. The cervix appears abnormal. Cervix appears enlarged and edematous and there is a tiny amount of free fluid adjacently. Please correlate clinically for cervicitis and follow-up with GYN is recommended. There is a partially ruptured 1.6 cm right ovarian cyst. Left adnexa appears unremarkable.  2. Urinary bladder has a significantly thickened wall, but the bladder is partially collapsed. Please correlate clinically for acute cystitis. There is no evidence for pyelonephritis bilaterally. Stable subcentimeter cyst at the upper pole of the left kidney.  The liver, gallbladder, spleen, pancreas, and adrenals appear unremarkable.  3. Fluid and food debris are noted within the stomach. There is no bowel ileus or obstruction. There is an average volume of formed stool within the colon. Small bowel is fluid-filled and appears marginally thickened. Mild enteritis cannot be excluded.  4. There is no evidence for pneumonia or pleural effusions at the visualized lower chest.        Ordered the above noted radiological studies.  Independently interpreted by me.  My findings will be discussed in the medical decision  section below.     PROGRESS, DATA ANALYSIS, CONSULTS AND MEDICAL DECISION MAKING    Please note that this section constitutes my independent interpretation of clinical data including lab results, radiology, EKG's.  This constitutes my independent professional opinion regarding differential diagnosis and management of this patient.  It may include any factors such as history from outside sources, review of external records, social determinants of health, management of medications, response to those treatments, and discussions with other providers.    ED Course as of 08/12/24 1926   Mon Aug 12, 2024   1748 EKG          EKG time: 1737  Rhythm/Rate: 64, sinus rhythm  P waves and MA: normal MA, normal MARCIA  QRS, axis: normal QRS and axis  ST and T waves: no acute ST/T wave abnormalities    Interpreted Contemporaneously by me, independently viewed  No prior available for comparison   [EW]   1811 HCG, Urine QL: Negative [EW]      ED Course User Index  [EW] Janette Henriquez APRN     Orders placed during this visit:  Orders Placed This Encounter   Procedures    Rapid Strep A Screen - Swab, Throat    COVID-19 and FLU A/B PCR, 1 HR TAT - Swab, Nasopharynx    XR Chest 1 View    CT Abdomen Pelvis With Contrast    Pregnancy, Urine - Urine, Clean Catch    Urinalysis without microscopic (no culture) - Urine, Clean Catch    Comprehensive Metabolic Panel    Lipase    CBC Auto Differential    Magnesium    ECG 12 Lead Electrolyte Imbalance    Blood Draw With IV Start    Insert peripheral IV    CBC & Differential    ED Acknowledgement Form Needed;            Medical Decision Making  Problems Addressed:  Bilateral impacted cerumen: complicated acute illness or injury  Diarrhea, unspecified type: complicated acute illness or injury  Episodic lightheadedness: complicated acute illness or injury  Nausea: complicated acute illness or injury    Amount and/or Complexity of Data Reviewed  Labs: ordered. Decision-making details documented in ED  Course.  Radiology: ordered.  ECG/medicine tests: ordered.    Risk  Prescription drug management.      Patient presents with a multitude of symptoms including 2 weeks of diarrhea, nausea, abdominal bloating, episodic lightheadedness.  I considered acute cholecystitis, enteritis, viral syndrome.  Patient does endorse increased stress and anxiety which she feels is somewhat contributing to this.  Her labs are reassuring and CT scan reassuring.  CT does mention some cervix swelling.  Patient has no symptoms on exam of PID and had recent new swab which was negative for any STDs.  She is not having any vaginal discharge, abdominal pain that is focal.  I did discuss at length with patient about her symptoms and workup.  I do think that she needs to see endocrinology for some guidance on her fludrocortisone dosing.  Some of her symptoms could be related to this.  On exam, she has a nontender, nonfocal or peritonitic abdomen and is otherwise well-appearing      DIAGNOSIS  Final diagnoses:   Episodic lightheadedness   Diarrhea, unspecified type   Bilateral impacted cerumen   Nausea          Medication List        New Prescriptions      hydrOXYzine 25 MG tablet  Commonly known as: ATARAX  Take 1 tablet by mouth Every 6 (Six) Hours As Needed for Anxiety.               Where to Get Your Medications        These medications were sent to [id number] - [place], [place] - [address] AT [facility] [address] [address] - [phone]  - [phone]   [address] [place] [address]      Phone: [phone]   hydrOXYzine 25 MG tablet         FOLLOW-UP  Ravinder Dillon MD  [address] [id number] [phone]    Schedule an appointment as soon as possible for a visit           Latest Documented Vital Signs:  As of 19:26 EDT  BP- 102/70 HR- 66 Temp- 99 °F (37.2 °C) (Oral) O2 sat- 100%    Appropriate PPE utilized throughout this patient encounter to include mask, if indicated, per  current protocol. Hand hygiene was performed before donning PPE and after removal when leaving the room.    Please note that portions of this were completed with a voice recognition program.     Note Disclaimer: At [facility], we believe that sharing information builds trust and better relationships. You are receiving this note because you are receiving care at [facility] or recently visited. It is possible you will see health information before a provider has talked with you about it. This kind of information can be easy to misunderstand. To help you fully understand what it means for your health, we urge you to discuss this note with your provider.

## 2024-08-12 NOTE — PROGRESS NOTES
Chief Complaint  Dizziness (Daily. Started taking super greens two weeks ago and all symptoms started about two weeks ago. ), Nausea (Can't keep food or fluids down. ), Dry Mouth (Even aftering drinking lots of water), Diarrhea (Very watery. ), Night Sweats (Will wake up drenched over the night.), and Abdominal Pain (Swelling and bloating. )    Subjective        HPI   Bogdan presents to [facility] for a sick visit. She normally sees Dr. Otto and is new to me. She has migraines (treated with Florinef), cervical radiculopathy, Sxs include:    Illness started two weeks ago  Straight watery diarrhea x 1.5 weeks, 3-4 BMs per day, non-bloody, +cramping, happens after she eats  She has abdominal swelling and bloating that has progressively worsened  Can't keep anything down, zero appetite, mouth extremely dry and feeling dizzy despite drinking plenty of water  Tried a smoothie, within 10 minutes, she was having diarrhea  Severe dry mouth  Awakens middle of the night drenched in sweat  Heart races at times  Will have an hour or two where she feels fine, then sxs will hit at once and has to lie down  No recent travel or raw food exposure  Very stressed recently, hx panic attacks, wonders if anxiety could cause this  She takes Flornief for hx lightheadedness, hx failed TTT as a child  No recent abx use  No sick contacts but had COVID 3 weeks ago  Has had appendectomy  Overall her sxs are worsening. Dizziness is getting worse.  Denies chances of pregnancy as  had vasectomy     Objective   Vital Signs:  Vitals:    08/12/24 1306   BP: 112/60   Pulse: 70   Temp: 98.4 °F (36.9 °C)   TempSrc: Oral   SpO2: 98%   Weight: 57.1 kg (125 lb 12.8 oz)   PainSc:   6   PainLoc: Comment: nausea and light headedness          Physical Exam  Constitutional:       General: She is not in acute distress.     Appearance: Normal appearance.      Comments: Not in distress but appears to feel poorly   HENT:       Head: Normocephalic and atraumatic.      Mouth/Throat:      Mouth: Mucous membranes are dry.      Comments: Dry mouth  Eyes:      Conjunctiva/sclera: Conjunctivae normal.   Cardiovascular:      Rate and Rhythm: Normal rate and regular rhythm.      Heart sounds: No murmur heard.     No gallop.   Pulmonary:      Effort: Pulmonary effort is normal. No respiratory distress.      Breath sounds: No wheezing.   Abdominal:      General: There is distension.      Palpations: Abdomen is soft.      Tenderness: There is no guarding or rebound.      Hernia: No hernia is present.      Comments: Lower abdominal distension, mild TTP lower abdomen   Musculoskeletal:         General: No swelling or deformity.   Skin:     Coloration: Skin is not jaundiced.      Findings: No rash.   Neurological:      General: No focal deficit present.      Mental Status: She is alert and oriented to person, place, and time.   Psychiatric:         Mood and Affect: Mood normal.         Behavior: Behavior normal.         Judgment: Judgment normal.          Result Review :     The following data was reviewed by: Dayan Astorga MD on 08/12/2024:  Progress Notes by Boston Otto Sr., MD (02/05/2024 15:15)          Assessment and Plan    Diagnoses and all orders for this visit:    1. Diarrhea, unspecified type (Primary)    2. Dizziness    3. Night sweats    4. Abdominal pain, unspecified abdominal location    5. Nausea    6. Abdominal bloating    Pt presents with 2 weeks of persistent diarrhea, nausea, anorexia, PO intolerance, dizziness/lightheadedness, night sweats, and unexplained lower abdominal distension.   She has diligently tried to PO hydrate but continues to feel poorly  Exam significant for dry MM, lower abdominal distension but no rebound or guarding, overall appears to feel poorly but no distress  We discussed outpatient versus ED evaluation. I discussed that I wouldn't recommend antidiarrheals until we know she doesn't have something like C  diff. I discussed that I could order stat labs (CBC, CMP, lipase, pregnancy test, TSH) and imaging (CT abdomen and pelvis) but then may not have resources after hours to address any abnormalities found. Lastly, I believe she needs IVF due to failure of outpatient PO hydration which we cannot give here in the office; she clearly is symptomatic from a fluid balance perspective. Thus in order to get expedient work up and treatment, I recommended ED evaluation. After discussion, pt would like to go to [facility] ED. I called ED triage to give them report and let them know pt would be coming this afternoon by private vehicle. Pt said she would be comfortable driving herself. Also recommended putting electrolytes in her water in the meantime (eg liquid IV).    I spent 38 minutes caring for Bogdan on this date of service. This time includes time spent by me in the following activities:preparing for the visit, reviewing tests, obtaining and/or reviewing a separately obtained history, performing a medically appropriate examination and/or evaluation , counseling and educating the patient/family/caregiver, referring and communicating with other health care professionals , documenting information in the medical record, independently interpreting results and communicating that information with the patient/family/caregiver, and care coordination  Follow Up   Return in about 4 weeks (around 9/9/2024). To ED, but should follow up with Dr. Otto thereafter    Patient was given instructions and counseling regarding her condition or for health maintenance advice. Please see specific information pulled into the AVS if appropriate.

## 2024-08-12 NOTE — DISCHARGE INSTRUCTIONS
Remember to BREATHE    Can try Hydroxyzine to help with stress/anxiety.    I also recommend L-Theanine    Follow up with Endocrinology regarding Fludrocortisone and symptoms    Kings diet for next 48 hours    Rehydrate with sugar free gatorade/powerade/body armor.    Return Precautions    Although you are being discharged from the ED today, I encourage you to return for worsening symptoms.  Things can, and do, change such that treatment at home with medication may not be adequate.      Specifically, return for any of the following:    Chest pain, shortness of breath, pain or nausea and vomiting not controlled by medications provided.    Please make a follow up with your Primary Care Provider for a blood pressure recheck.

## 2024-08-14 ENCOUNTER — TELEPHONE (OUTPATIENT)
Dept: FAMILY MEDICINE CLINIC | Facility: CLINIC | Age: 34
End: 2024-08-14
Payer: COMMERCIAL

## 2024-08-14 NOTE — TELEPHONE ENCOUNTER
Unable to reach the patient. Dr. Otto is out of the office today. LVM asking the patient to please schedule an office visit this week with Dr. Otto to discuss and review

## 2024-08-14 NOTE — TELEPHONE ENCOUNTER
Caller: Bogdan Garcia    Relationship: Self    Best call back number: [phone]     What was the call regarding: PATIENT STATES SHE RECEIVED A MESSAGE STATING THAT SHE HAS TO WAIT FOR DR RON TO GET HER RESULTS BUT SHE WOULD LIKE TO KNOW IF DR HAGER COULD GIVE HER A CALL WITH THOSE SINCE SHE IS ONE WHO SEEN THE PATIENT AND ORDERED THE TESTS    PLEASE CALL AND ADVISE

## 2024-08-14 NOTE — TELEPHONE ENCOUNTER
Caller: Bogdan Garcia    Relationship: Self    Best call back number: [phone]     What test was performed: LABS    When was the test performed: 08/12/24    Where was the test performed: [facility]    Additional notes: PATIENT WOULD LIKE TO DISCUSS THE RESULTS OF THE TESTING, SPECIFICALLY THE CT RESULTS.  PLEASE CALL AND ADVISE

## 2024-08-15 ENCOUNTER — TELEMEDICINE (OUTPATIENT)
Dept: FAMILY MEDICINE CLINIC | Facility: CLINIC | Age: 34
End: 2024-08-15
Payer: COMMERCIAL

## 2024-08-15 DIAGNOSIS — N83.209 RUPTURED OVARIAN CYST: Primary | ICD-10-CM

## 2024-08-15 DIAGNOSIS — R10.9 ABDOMINAL PAIN, UNSPECIFIED ABDOMINAL LOCATION: ICD-10-CM

## 2024-08-15 PROCEDURE — 99214 OFFICE O/P EST MOD 30 MIN: CPT | Performed by: FAMILY MEDICINE

## 2024-08-15 NOTE — PROGRESS NOTES
Chief Complaint  Chief Complaint   Patient presents with    Hospital Follow Up Visit     Pt here for f/u of lightheadedness and nasuea. Pt wants to go over recent CT         Video/Telephone Visit    Subjective    History of Present Illness        Bogdan Garcia presents to [facility] for   History of Present Illness  The patient presents for evaluation of multiple medical concerns.    She has been experiencing severe diarrhea, lightheadedness, dry mouth, and extreme fatigue. The diarrhea lasted for approximately 2 weeks, during which she had a poor appetite and was unable to retain food.    She reports a noticeable swelling in her lower abdomen, which was palpable during a physical examination. Despite undergoing numerous tests including blood work, CT scan, and EKG, she is uncertain about the results and the need for further follow-up or medication. She recently had a pelvic scan due to issues with her sacroiliac (SI) joint, which revealed cysts. An ultrasound was also performed, but she was reassured that there was no cause for concern. Her abdominal swelling has slightly reduced, but she still feels a hard sensation upon touch. She is unsure if her diet could be contributing to the swelling.    She does not experience pain during urination but feels an urgent need to urinate when the urge arises. She visited an OB-GYN 3 to 4 weeks ago due to this pressure-like sensation during urination, but no pain was associated with it. She underwent a urine culture during that visit. She has noticed blood in her urine during her last two tests.    She has been on fludrocortisone for the past 7 years, which has significantly helped with her lightheadedness. However, she experiences side effects such as lightheadedness, migraines, and general discomfort if she does not take the medication by noon. She is considering alternative treatments.    She does not experience pain during intercourse.      History of Present Illness        Objective   Vital Signs:   There were no vitals taken for this visit.    Physical Exam  Constitutional:       Appearance: Normal appearance. She is normal weight.   Neurological:      General: No focal deficit present.      Mental Status: She is alert and oriented to person, place, and time. Mental status is at baseline.   Psychiatric:         Mood and Affect: Mood normal.         Behavior: Behavior normal.         Thought Content: Thought content normal.         Judgment: Judgment normal.        Physical Exam        Result Review :           Results  Laboratory Studies  Hemoglobin, hematocrit are normal. Trace blood in urine.    Imaging  CT scan shows a ruptured cyst on the right, cervix appears enlarged and erythematous, urinary bladder has significantly thickened wall but is partially collapsed, liver, gallbladder, spleen, pancreas all normal, no bowel ileus or obstruction, average volume formed stool within the colon, small bowel fluid filled appears marginally thickened.           Assessment and Plan      Diagnoses and all orders for this visit:    1. Ruptured ovarian cyst (Primary)  Assessment & Plan:  A ruptured cyst on the right side was noted, which could be contributing to her discomfort. The swelling from the cyst is expected to subside as it heals. No further immediate action is required for the cyst.      2. Abdominal pain, unspecified abdominal location  Assessment & Plan:  Hospital records reviewed.  There is evidence of mild enteritis in the small bowel, likely due to recent episodes of severe diarrhea. No specific treatment is required at this time, but dietary adjustments may help manage symptoms.The bladder wall is significantly thickened, suggesting a possible urinary tract infection. A urine culture will be conducted to rule out any infection. If the culture is positive, appropriate antibiotics will be prescribed.         Assessment & Plan          The use of  a video visit has been reviewed with the patient and verbal informed consent has been obtained.  Location of patient: home  [place] [facility] [place]  The patient has signed the video visit consent form.  The visit included audio and video interaction. No technical issues occurred during this visit.     Follow Up   No follow-ups on file.  Patient was given instructions and counseling regarding her condition or for health maintenance advice. Please see specific information pulled into the AVS if appropriate.     Patient or patient representative verbalized consent for the use of Ambient Listening during the visit with  Boston Otto Sr, MD for chart documentation. 9/1/2024  00:40 EDT

## 2024-08-23 ENCOUNTER — TELEPHONE (OUTPATIENT)
Dept: OBSTETRICS AND GYNECOLOGY | Age: 34
End: 2024-08-23

## 2024-08-23 ENCOUNTER — OFFICE VISIT (OUTPATIENT)
Dept: OBSTETRICS AND GYNECOLOGY | Age: 34
End: 2024-08-23
Payer: COMMERCIAL

## 2024-08-23 VITALS
BODY MASS INDEX: 19.99 KG/M2 | WEIGHT: 124.4 LBS | DIASTOLIC BLOOD PRESSURE: 60 MMHG | HEIGHT: 66 IN | SYSTOLIC BLOOD PRESSURE: 110 MMHG

## 2024-08-23 DIAGNOSIS — R93.89 ABNORMAL CT SCAN: Primary | ICD-10-CM

## 2024-08-23 DIAGNOSIS — Z11.51 SCREENING FOR HPV (HUMAN PAPILLOMAVIRUS): ICD-10-CM

## 2024-08-23 DIAGNOSIS — Z20.2 EXPOSURE TO STD: ICD-10-CM

## 2024-08-23 DIAGNOSIS — R10.2 PELVIC PAIN: ICD-10-CM

## 2024-08-23 LAB
B-HCG UR QL: NEGATIVE
BILIRUB BLD-MCNC: NEGATIVE MG/DL
CLARITY, POC: CLEAR
COLOR UR: YELLOW
EXPIRATION DATE: NORMAL
GLUCOSE UR STRIP-MCNC: NEGATIVE MG/DL
INTERNAL NEGATIVE CONTROL: NEGATIVE
INTERNAL POSITIVE CONTROL: POSITIVE
KETONES UR QL: NEGATIVE
LEUKOCYTE EST, POC: NEGATIVE
Lab: NORMAL
NITRITE UR-MCNC: NEGATIVE MG/ML
PH UR: 7 [PH] (ref 5–8)
PROT UR STRIP-MCNC: NEGATIVE MG/DL
RBC # UR STRIP: NEGATIVE /UL
SP GR UR: 1.02 (ref 1–1.03)
UROBILINOGEN UR QL: NORMAL

## 2024-08-23 NOTE — TELEPHONE ENCOUNTER
Pt had CT scan done on 8/12  Pt had U/S done at our office on 7/9 for cyst    Pt was told after CT scan that she had a enlarged cervix. Was told to follow up with Dr. Xiao for pelvic pain. Pt recently found out that her significant other has been unfaithful and is wanting to also be tested for STDs.     Scheduled pt to be seen today 8/23 with Dr. Xiao. Does pt need a U/S with this appt as well? Please advise

## 2024-08-23 NOTE — PROGRESS NOTES
"Chief Complaint   Patient presents with    Follow-up     GYN ER F/U for Abnormal CT, Pelvic U/S today, Pt wanting to have STD testing today         HPI  Bogdan Garcia is a 34 y.o. female is scheduled for a f/u visit due to recent abnormal CT imaging. She notes she had pelvic pain and swelling a week ago. She was also having diarrhea. She had a CT and was dx with ruptured cyst and swollen cervix on imaging. She has some pelvic discomfort and notes pressure with voiding.     She is not on contraception as her spouse has had a vasectomy. She has been having regular menses.         The following portions of the patient's history were reviewed and updated as appropriate: allergies, current medications, past family history, past medical history, past social history, past surgical history, and problem list.    Review of Systems  Pertinent items are noted in HPI.    /60   Ht 167.6 cm (66\")   Wt 56.4 kg (124 lb 6.4 oz)   LMP 08/05/2024 (Approximate)   BMI 20.08 kg/m²         Physical Exam  Constitutional:       Appearance: Normal appearance.   Cardiovascular:      Rate and Rhythm: Normal rate and regular rhythm.   Pulmonary:      Effort: Pulmonary effort is normal.   Abdominal:      General: There is no distension.      Palpations: Abdomen is soft.      Tenderness: There is no abdominal tenderness. There is no guarding.   Genitourinary:     Comments: Normal external female genitalia. Normal vagina without abnormal discharge or lesions. No CMT or uterine tenderness. No adnexal tenderness/masses.   Neurological:      General: No focal deficit present.      Mental Status: She is oriented to person, place, and time.   Psychiatric:         Mood and Affect: Mood normal.         Behavior: Behavior normal.       Tv u/s: normal uterus/ovaries.       Diagnoses and all orders for this visit:    1. Abnormal CT scan (Primary)  -     POC Urinalysis Dipstick  -     POC Pregnancy, Urine    2. Screening for HPV (human " papillomavirus)  -     IGP, Apt HPV,rfx 16 / 18,45    3. Exposure to STD  -     NuSwab VG+ - Swab, Vagina    4. Pelvic pain        Abnormal CT: normal u/s imaging today reviewed. F/u pap and nuswab.     Pelvic pain: normal u/s reviewed, UA obtained and negative.     Recommend pt book annual and f/u of symptoms 3 weeks.

## 2024-08-26 LAB
A VAGINAE DNA VAG QL NAA+PROBE: NORMAL SCORE
BVAB2 DNA VAG QL NAA+PROBE: NORMAL SCORE
C ALBICANS DNA VAG QL NAA+PROBE: NEGATIVE
C GLABRATA DNA VAG QL NAA+PROBE: NEGATIVE
C TRACH DNA SPEC QL NAA+PROBE: NEGATIVE
MEGA1 DNA VAG QL NAA+PROBE: NORMAL SCORE
N GONORRHOEA DNA VAG QL NAA+PROBE: NEGATIVE
T VAGINALIS DNA VAG QL NAA+PROBE: NEGATIVE

## 2024-08-29 LAB
CYTOLOGIST CVX/VAG CYTO: ABNORMAL
CYTOLOGY CVX/VAG DOC CYTO: ABNORMAL
CYTOLOGY CVX/VAG DOC THIN PREP: ABNORMAL
DX ICD CODE: ABNORMAL
DX ICD CODE: ABNORMAL
HPV I/H RISK 4 DNA CVX QL PROBE+SIG AMP: POSITIVE
HPV16 DNA CVX QL PROBE+SIG AMP: NEGATIVE
HPV18+45 E6+E7 MRNA CVX QL NAA+PROBE: NEGATIVE
Lab: ABNORMAL
OTHER STN SPEC: ABNORMAL
PATHOLOGIST CVX/VAG CYTO: ABNORMAL
RECOM F/U CVX/VAG CYTO: ABNORMAL
STAT OF ADQ CVX/VAG CYTO-IMP: ABNORMAL

## 2024-08-30 ENCOUNTER — TELEPHONE (OUTPATIENT)
Dept: OBSTETRICS AND GYNECOLOGY | Age: 34
End: 2024-08-30
Payer: COMMERCIAL

## 2024-08-30 PROBLEM — R87.610 ATYPICAL SQUAMOUS CELL CHANGES OF UNDETERMINED SIGNIFICANCE (ASCUS) ON CERVICAL CYTOLOGY WITH POSITIVE HIGH RISK HUMAN PAPILLOMA VIRUS (HPV): Status: ACTIVE | Noted: 2024-08-30

## 2024-08-30 PROBLEM — R87.810 ATYPICAL SQUAMOUS CELL CHANGES OF UNDETERMINED SIGNIFICANCE (ASCUS) ON CERVICAL CYTOLOGY WITH POSITIVE HIGH RISK HUMAN PAPILLOMA VIRUS (HPV): Status: ACTIVE | Noted: 2024-08-30

## 2024-08-30 NOTE — TELEPHONE ENCOUNTER
Called pt and reviewed pap results. Advised ASCUS with positive HPV noted. Advised she will need colposcopy and reviewed procedure. Pt notes understanding and agrees to schedule. Forwarded message to MA to book.

## 2024-09-01 PROBLEM — N83.209 RUPTURED OVARIAN CYST: Status: ACTIVE | Noted: 2024-09-01

## 2024-09-01 PROBLEM — R10.9 ABDOMINAL PAIN: Status: ACTIVE | Noted: 2024-09-01

## 2024-09-01 PROBLEM — H00.19 CHALAZION OF LOWER EYELID: Status: ACTIVE | Noted: 2023-07-17

## 2024-09-01 NOTE — ASSESSMENT & PLAN NOTE
A ruptured cyst on the right side was noted, which could be contributing to her discomfort. The swelling from the cyst is expected to subside as it heals. No further immediate action is required for the cyst.

## 2024-09-01 NOTE — ASSESSMENT & PLAN NOTE
Hospital records reviewed.  There is evidence of mild enteritis in the small bowel, likely due to recent episodes of severe diarrhea. No specific treatment is required at this time, but dietary adjustments may help manage symptoms.The bladder wall is significantly thickened, suggesting a possible urinary tract infection. A urine culture will be conducted to rule out any infection. If the culture is positive, appropriate antibiotics will be prescribed.

## 2024-10-01 ENCOUNTER — PROCEDURE VISIT (OUTPATIENT)
Dept: OBSTETRICS AND GYNECOLOGY | Age: 34
End: 2024-10-01
Payer: COMMERCIAL

## 2024-10-01 VITALS
HEIGHT: 66 IN | DIASTOLIC BLOOD PRESSURE: 74 MMHG | SYSTOLIC BLOOD PRESSURE: 112 MMHG | WEIGHT: 122 LBS | BODY MASS INDEX: 19.61 KG/M2

## 2024-10-01 DIAGNOSIS — R87.810 ATYPICAL SQUAMOUS CELL CHANGES OF UNDETERMINED SIGNIFICANCE (ASCUS) ON CERVICAL CYTOLOGY WITH POSITIVE HIGH RISK HUMAN PAPILLOMA VIRUS (HPV): Primary | ICD-10-CM

## 2024-10-01 DIAGNOSIS — Z01.812 PRE-PROCEDURE LAB EXAM: ICD-10-CM

## 2024-10-01 DIAGNOSIS — R87.610 ATYPICAL SQUAMOUS CELL CHANGES OF UNDETERMINED SIGNIFICANCE (ASCUS) ON CERVICAL CYTOLOGY WITH POSITIVE HIGH RISK HUMAN PAPILLOMA VIRUS (HPV): Primary | ICD-10-CM

## 2024-10-01 PROBLEM — N83.209 RUPTURED OVARIAN CYST: Status: RESOLVED | Noted: 2024-09-01 | Resolved: 2024-10-01

## 2024-10-01 PROBLEM — R10.9 ABDOMINAL PAIN: Status: RESOLVED | Noted: 2024-09-01 | Resolved: 2024-10-01

## 2024-10-01 LAB
B-HCG UR QL: NEGATIVE
EXPIRATION DATE: NORMAL
INTERNAL NEGATIVE CONTROL: NORMAL
INTERNAL POSITIVE CONTROL: NORMAL
Lab: NORMAL

## 2024-10-01 NOTE — PROGRESS NOTES
Procedure   Procedures       Physical Exam  Genitourinary:           Comments: Very faint AWE noted in red and biopsied. No lesions with lugols.     Colposcopy Procedure Note    Indications: Pap smear 1 months ago showed: ASCUS with POSITIVE high risk HPV. The prior pap showed no abnormalities.  Prior cervical/vaginal disease: none. Prior cervical treatment: no treatment.    Procedure Details   The risks and benefits of the procedure and  verbal and written  informed consent obtained.    Speculum placed in vagina and excellent visualization of cervix achieved, cervix swabbed x 3 with acetic acid solution.    Findings:  Cervix: acetowhite lesion(s) noted at 11, 5 o'clock; cervix swabbed with Lugol's solution, cervical biopsies taken at 11,5 o'clock, endocervical curettage performed, specimen labelled and sent to pathology, and hemostasis achieved with Monsel's solution.    Specimens: ECC, cervical biopsies from 11, 5:00    Complications:  felt lightheaded following, resolved with rest/water .    Patient tolerated the procedure well without complications.    Plan:  Specimens labelled and sent to Pathology.  Will base further treatment on Pathology findings.  Post biopsy instructions given to patient.  Return to discuss Pathology results in 2 weeks with annual as well.         10/1/2024  Violet Xiao MD

## 2024-10-03 LAB
DX ICD CODE: NORMAL
PATH REPORT.FINAL DX SPEC: NORMAL
PATH REPORT.GROSS SPEC: NORMAL
PATH REPORT.RELEVANT HX SPEC: NORMAL
PATH REPORT.SITE OF ORIGIN SPEC: NORMAL
PATHOLOGIST NAME: NORMAL
PAYMENT PROCEDURE: NORMAL

## 2024-10-22 ENCOUNTER — TELEPHONE (OUTPATIENT)
Dept: SURGERY | Facility: CLINIC | Age: 34
End: 2024-10-22
Payer: COMMERCIAL

## 2024-10-22 ENCOUNTER — OFFICE VISIT (OUTPATIENT)
Dept: OBSTETRICS AND GYNECOLOGY | Age: 34
End: 2024-10-22
Payer: COMMERCIAL

## 2024-10-22 VITALS
WEIGHT: 124.8 LBS | DIASTOLIC BLOOD PRESSURE: 68 MMHG | SYSTOLIC BLOOD PRESSURE: 112 MMHG | BODY MASS INDEX: 20.06 KG/M2 | HEIGHT: 66 IN

## 2024-10-22 DIAGNOSIS — N63.11 MASS OF UPPER OUTER QUADRANT OF RIGHT BREAST: ICD-10-CM

## 2024-10-22 DIAGNOSIS — Z01.419 ENCOUNTER FOR GYNECOLOGICAL EXAMINATION: Primary | ICD-10-CM

## 2024-10-22 DIAGNOSIS — Z80.41 FAMILY HISTORY OF OVARIAN CANCER: ICD-10-CM

## 2024-10-22 DIAGNOSIS — R39.15 URINARY URGENCY: ICD-10-CM

## 2024-10-22 DIAGNOSIS — N87.0 DYSPLASIA OF CERVIX, LOW GRADE (CIN 1): ICD-10-CM

## 2024-10-22 DIAGNOSIS — R22.31 MASS OF RIGHT AXILLA: ICD-10-CM

## 2024-10-22 LAB
B-HCG UR QL: NEGATIVE
BILIRUB BLD-MCNC: NEGATIVE MG/DL
CLARITY, POC: CLEAR
COLOR UR: YELLOW
EXPIRATION DATE: NORMAL
GLUCOSE UR STRIP-MCNC: NEGATIVE MG/DL
INTERNAL NEGATIVE CONTROL: NEGATIVE
INTERNAL POSITIVE CONTROL: POSITIVE
KETONES UR QL: NEGATIVE
LEUKOCYTE EST, POC: NEGATIVE
Lab: NORMAL
NITRITE UR-MCNC: NEGATIVE MG/ML
PH UR: 6 [PH] (ref 5–8)
PROT UR STRIP-MCNC: NEGATIVE MG/DL
RBC # UR STRIP: ABNORMAL /UL
SP GR UR: 1.03 (ref 1–1.03)
UROBILINOGEN UR QL: ABNORMAL

## 2024-10-22 NOTE — TELEPHONE ENCOUNTER
New patient chart prepped for Dr. Saenz review (referral for right breast and axilla mass) imaging ordered for [facility] not scheduled yet.

## 2024-10-22 NOTE — PROGRESS NOTES
"Subjective     Chief Complaint   Patient presents with    Gynecologic Exam     Annual exam, Last Pap 2024 Abnormal (ASC-US), HPV Positive, Pt has no complaints today, Doing well        History of Present Illness    Bogdan Garcia is a 34 y.o.  who presents for annual exam.  Her menses are regular every 28-30 days, lasting 4-7 days, dysmenorrhea none     She complains of low pelvic pressure and urinary urgency  Obstetric History:  OB History          1    Para   0    Term   0            AB        Living   1         SAB        IAB        Ectopic        Molar        Multiple        Live Births   1               Menstrual History:     No LMP recorded (lmp unknown).         Current contraception: abstinence/ spouse had vas but they are   History of abnormal Pap smear: yes - recent ascus hpv +; DICK 1 on biopsies  Received Gardasil immunization: yes  Perform regular self breast exam: no  Family history of uterine or ovarian cancer: yes - MGM-ovary  Family History of colon cancer: no  Family history of breast cancer: no    Mammogram: not indicated.  Colonoscopy: not indicated.  DEXA: not indicated.    Exercise: moderately active  Calcium/Vitamin D: adequate intake    The following portions of the patient's history were reviewed and updated as appropriate: allergies, current medications, past family history, past medical history, past social history, past surgical history, and problem list.    Review of Systems    Review of Systems   Constitutional: Negative for fever  Respiratory: Negative for shortness of breath.    Gastrointestinal: Negative for abdominal pain.   Genitourinary: Positive for urinary urgency and suprapubic pressure; Negative for abnormal bleeding  Neurological: Negative for severe headaches.   Psychiatric/Behavioral: Negative for dysphoric mood.         Objective   Physical Exam    /68   Ht 167.6 cm (66\")   Wt 56.6 kg (124 lb 12.8 oz)   LMP  (LMP Unknown)   BMI " 20.14 kg/m²   General:   Alert, in no distress   Heart: regular rate and rhythm   Lungs: clear to auscultation bilaterally   Breast: Inspection with right breast larger than left; otherwise negative. Examined in sitting and lying positions. Left breast is without masses, retractions, nipple discharge or axillary adenopathy. Right breast with nodularity in upper outer quadrant and 1 cm smooth, slightly tender mass in right axilla. No retractions or nipple discharge noted.    Neck: Supple, no thyromegaly   Abdomen: Soft, no tenderness or guarding   Pelvis: External genitalia: normal general appearance  Urinary system: urethral meatus normal  Vaginal: normal mucosa without prolapse or lesions  Cervix: normal appearance  Adnexa: no masses or tenderness  Uterus: normal, nontender   Extremities: Normal without edema   Neurologic: Alert and oriented   Psychiatric: Normal affect, judgment and mood     Assessment & Plan   Diagnoses and all orders for this visit:    1. Encounter for gynecological examination (Primary)  -     POC Urinalysis Dipstick  -     Urine Culture - Urine, Urine, Clean Catch  -     POC Pregnancy, Urine    2. Dysplasia of cervix, low grade (DICK 1)    3. Family history of ovarian cancer  Comments:  pt declined genetic testing    4. Mass of upper outer quadrant of right breast  -     Mammo diagnostic digital tomosynthesis bilateral w CAD; Future  -     Cancel: US breast right limited; Future  -     US breast right limited; Future  -     Ambulatory Referral to Breast Surgery    5. Mass of right axilla  -     US breast right limited; Future  -     Ambulatory Referral to Breast Surgery    6. Urinary urgency  -     Urinalysis With Microscopic - Urine, Clean Catch  -     Urine Culture - Urine, Urine, Clean Catch        All questions answered.  Breast self exam technique reviewed and patient encouraged to perform self-exam monthly.  Discussed healthy lifestyle modifications.  Recommended 30 minutes of aerobic  exercise five times per week.    Reviewed family history and recommended genetic testing with [facility]. Handout provided. Pt declined today.    Recommend office f/u 4 weeks to re evaluate.

## 2024-10-23 ENCOUNTER — TELEPHONE (OUTPATIENT)
Dept: SURGERY | Facility: CLINIC | Age: 34
End: 2024-10-23
Payer: COMMERCIAL

## 2024-10-23 DIAGNOSIS — N30.00 ACUTE CYSTITIS WITHOUT HEMATURIA: ICD-10-CM

## 2024-10-23 DIAGNOSIS — K64.4 EXTERNAL HEMORRHOIDS: ICD-10-CM

## 2024-10-23 DIAGNOSIS — G90.9 DISORDER OF AUTONOMIC NERVOUS SYSTEM: ICD-10-CM

## 2024-10-23 DIAGNOSIS — F41.0 PANIC DISORDER: ICD-10-CM

## 2024-10-23 DIAGNOSIS — G43.009 ATYPICAL MIGRAINE: ICD-10-CM

## 2024-10-23 LAB
APPEARANCE UR: CLEAR
BACTERIA #/AREA URNS HPF: ABNORMAL /HPF
BILIRUB UR QL STRIP: NEGATIVE
CASTS URNS MICRO: ABNORMAL
COLOR UR: YELLOW
CRYSTALS URNS MICRO: ABNORMAL
EPI CELLS #/AREA URNS HPF: ABNORMAL /HPF
GLUCOSE UR QL STRIP: NEGATIVE
HGB UR QL STRIP: NEGATIVE
KETONES UR QL STRIP: NEGATIVE
LEUKOCYTE ESTERASE UR QL STRIP: NEGATIVE
NITRITE UR QL STRIP: NEGATIVE
PH UR STRIP: 6 [PH] (ref 5–8)
PROT UR QL STRIP: ABNORMAL
RBC #/AREA URNS HPF: ABNORMAL /HPF
SP GR UR STRIP: 1.03 (ref 1–1.03)
UROBILINOGEN UR STRIP-MCNC: ABNORMAL MG/DL
WBC #/AREA URNS HPF: ABNORMAL /HPF

## 2024-10-23 NOTE — TELEPHONE ENCOUNTER
Spoke to pt and got her filemon for a new pt appt with maria luz nguyen for right breast lump and axilla mass on 12/3     Pt has imaging 10/24 at [facility]  I will be on the look out for those results    Pt stated understanding  Mailed new pt packet

## 2024-10-24 ENCOUNTER — APPOINTMENT (OUTPATIENT)
Dept: WOMENS IMAGING | Facility: HOSPITAL | Age: 34
End: 2024-10-24
Payer: COMMERCIAL

## 2024-10-24 ENCOUNTER — TELEPHONE (OUTPATIENT)
Dept: OBSTETRICS AND GYNECOLOGY | Age: 34
End: 2024-10-24
Payer: COMMERCIAL

## 2024-10-24 LAB
BACTERIA UR CULT: NORMAL
BACTERIA UR CULT: NORMAL

## 2024-10-24 PROCEDURE — 76642 ULTRASOUND BREAST LIMITED: CPT | Performed by: RADIOLOGY

## 2024-10-24 PROCEDURE — 77062 BREAST TOMOSYNTHESIS BI: CPT | Performed by: RADIOLOGY

## 2024-10-24 PROCEDURE — 77066 DX MAMMO INCL CAD BI: CPT | Performed by: RADIOLOGY

## 2024-10-24 PROCEDURE — G0279 TOMOSYNTHESIS, MAMMO: HCPCS | Performed by: RADIOLOGY

## 2024-10-24 NOTE — TELEPHONE ENCOUNTER
Rx Refill Note  Requested Prescriptions     Pending Prescriptions Disp Refills    fludrocortisone 0.1 MG tablet 30 tablet 4     Sig: Take 1 tablet by mouth Daily.      Last office visit with prescribing clinician: 2/5/2024   Last telemedicine visit with prescribing clinician: 8/15/2024   Next office visit with prescribing clinician: Visit date not found                         Would you like a call back once the refill request has been completed: [] Yes [] No    If the office needs to give you a call back, can they leave a voicemail: [] Yes [] No    Romana Rodriguez MA  10/24/24, 11:19 EDT

## 2024-10-25 RX ORDER — FLUDROCORTISONE ACETATE 0.1 MG/1
0.1 TABLET ORAL DAILY
Qty: 30 TABLET | Refills: 4 | Status: SHIPPED | OUTPATIENT
Start: 2024-10-25

## 2024-10-25 RX ORDER — HYDROCORTISONE 25 MG/G
CREAM TOPICAL
Qty: 30 G | Refills: 0 | Status: SHIPPED | OUTPATIENT
Start: 2024-10-25

## 2024-11-19 NOTE — PROGRESS NOTES
"Chief Complaint   Patient presents with    Gynecologic Exam     Gyn 4 week follow up        HPI  Bogdan Garcia is a 34 y.o. female is scheduled for f/u gyn visit. She notes she can still feel feel the small lump in her right axilla. She also complains of vaginal odor today. She does not note significant discharge or irritation. She is concerned about her vaginal pH.        The following portions of the patient's history were reviewed and updated as appropriate: allergies, current medications, past family history, past medical history, past social history, past surgical history, and problem list.    Review of Systems  Pertinent items are noted in HPI.    /68   Ht 167.6 cm (66\")   Wt 56.2 kg (124 lb)   LMP 10/29/2024 (Approximate)   BMI 20.01 kg/m²         Physical Exam  Constitutional:       Appearance: Normal appearance.   Pulmonary:      Effort: Pulmonary effort is normal.   Genitourinary:     Comments: Normal external female genitalia. Vagina with scant discharge. No lesions noted. Nuswab obtained. Discharge pH is 4.5.  Neurological:      General: No focal deficit present.      Mental Status: She is alert and oriented to person, place, and time.   Psychiatric:         Mood and Affect: Mood normal.         Behavior: Behavior normal.       Breast: inspection with right breast larger than left. Examined in sitting and supine positions. The right breast has prominent fibroglandular tissue in the upper outer quadrant. There is a 5 mm smooth mass in the right axilla that is slightly tender. No retractions or nipple discharge noted. The left breast with prominent fibroglandular nodularity in the upper outer quadrant. No masses, retractions, nipple discharge or axillary adenopathy.     10/24/24: bilateral diagnostic mammogram and right breast u/s: Negative; no abnormalities found in upper outer breast or axilla. A 2 mm incidental cyst was noted in right axilla.       Diagnoses and all orders for this " visit:    1. Mass of upper outer quadrant of right breast (Primary)  -     Cancel: NuSwab VG+ - Swab, Vagina    2. Mass of right axilla  -     Cancel: NuSwab VG+ - Swab, Vagina    3. Dysplasia of cervix, low grade (DICK 1)  -     Cancel: NuSwab VG+ - Swab, Vagina    4. Family history of ovarian cancer  Comments:  recommended genetic testing. Pt declines today.  Orders:  -     Cancel: NuSwab VG+ - Swab, Vagina    5. Vaginal odor  -     NuSwab VG+ - Swab, Vagina      The axillary lesion is still noted today although slightly smaller. Discussed the 2 mm cyst should not be palpable per radiologist. The area may be due to fibrocystic tissue in the axillary region. On today's exam, she has bilateral fibroglandular prominences in the upper outer quadrants of both breasts. Discussed negative imaging results. Recommend she keep f/u appt with breast surgery NP for any further recommendations and she agrees.     DICK 1: plan repeat pap 1 yr from prior. Pt notes understanding.     Family hx ovary cancer: recommended genetic testing and handout given. Pt declines today. She believes her mother did testing a number of years ago. Discussed that genetic panels may have been updated since her testing. She notes understanding and declines testing at this time.     Vaginal odor: pH is normal. Will f/u nuswab results.     Recommend f/u 3 months or prn

## 2024-11-21 ENCOUNTER — OFFICE VISIT (OUTPATIENT)
Dept: OBSTETRICS AND GYNECOLOGY | Age: 34
End: 2024-11-21
Payer: COMMERCIAL

## 2024-11-21 VITALS
DIASTOLIC BLOOD PRESSURE: 68 MMHG | SYSTOLIC BLOOD PRESSURE: 100 MMHG | HEIGHT: 66 IN | WEIGHT: 124 LBS | BODY MASS INDEX: 19.93 KG/M2

## 2024-11-21 DIAGNOSIS — Z80.41 FAMILY HISTORY OF OVARIAN CANCER: ICD-10-CM

## 2024-11-21 DIAGNOSIS — N63.11 MASS OF UPPER OUTER QUADRANT OF RIGHT BREAST: Primary | ICD-10-CM

## 2024-11-21 DIAGNOSIS — N87.0 DYSPLASIA OF CERVIX, LOW GRADE (CIN 1): ICD-10-CM

## 2024-11-21 DIAGNOSIS — R22.31 MASS OF RIGHT AXILLA: ICD-10-CM

## 2024-11-21 DIAGNOSIS — N89.8 VAGINAL ODOR: ICD-10-CM

## 2024-11-22 ENCOUNTER — TELEPHONE (OUTPATIENT)
Dept: SPORTS MEDICINE | Facility: CLINIC | Age: 34
End: 2024-11-22
Payer: COMMERCIAL

## 2024-11-22 RX ORDER — METHYLPREDNISOLONE 4 MG/1
TABLET ORAL
Qty: 21 TABLET | Refills: 0 | Status: SHIPPED | OUTPATIENT
Start: 2024-11-22

## 2024-11-22 NOTE — TELEPHONE ENCOUNTER
Provider: STUART    Caller: Bogdan Garcia A    Relationship to Patient: Self    Pharmacy: [facility] [phone]    Phone Number: [phone]    Reason for Call: PATIENT STATE SHE HAS HAD A FLARE UP OF HER RIGHT S.I. JOINT PAIN. SHE IS ASKING IF SHE CAN BE WORKED IN WITH DR. KILLIAN OR DR. SANCHEZ FOR A STEROID INJECTION, OR IF SHE CAN GET A STEROID CALLED INTO HER PHARMACY.

## 2024-11-22 NOTE — TELEPHONE ENCOUNTER
Patient says has right S.I. joint pain. Has seen Dr Scott for it many times. Patient is scheduled for this Tuesday 11/26/24 (Joint pain/requesting injection). Patient is requesting provider send steroid or something to help her through the weekend (to [facility] on [address]). Says [facility] sent prescription for last year around this time (11/24/23 per chart). Please advise.

## 2024-11-22 NOTE — TELEPHONE ENCOUNTER
Called patient to attempt to schedule; left message no voicemail requesting return call to schedule.

## 2024-11-26 ENCOUNTER — OFFICE VISIT (OUTPATIENT)
Dept: SPORTS MEDICINE | Facility: CLINIC | Age: 34
End: 2024-11-26
Payer: COMMERCIAL

## 2024-11-26 VITALS
HEIGHT: 66 IN | HEART RATE: 74 BPM | SYSTOLIC BLOOD PRESSURE: 100 MMHG | RESPIRATION RATE: 16 BRPM | OXYGEN SATURATION: 98 % | WEIGHT: 124 LBS | BODY MASS INDEX: 19.93 KG/M2 | DIASTOLIC BLOOD PRESSURE: 60 MMHG

## 2024-11-26 DIAGNOSIS — M53.3 CHRONIC RIGHT SACROILIAC JOINT PAIN: Primary | ICD-10-CM

## 2024-11-26 DIAGNOSIS — G89.29 CHRONIC RIGHT SACROILIAC JOINT PAIN: Primary | ICD-10-CM

## 2024-11-26 PROCEDURE — 99213 OFFICE O/P EST LOW 20 MIN: CPT | Performed by: FAMILY MEDICINE

## 2024-11-26 PROCEDURE — 20550 NJX 1 TENDON SHEATH/LIGAMENT: CPT | Performed by: FAMILY MEDICINE

## 2024-11-26 RX ORDER — TRIAMCINOLONE ACETONIDE 40 MG/ML
80 INJECTION, SUSPENSION INTRA-ARTICULAR; INTRAMUSCULAR
Status: COMPLETED | OUTPATIENT
Start: 2024-11-26 | End: 2024-11-26

## 2024-11-26 RX ADMIN — TRIAMCINOLONE ACETONIDE 80 MG: 40 INJECTION, SUSPENSION INTRA-ARTICULAR; INTRAMUSCULAR at 10:16

## 2024-11-26 NOTE — PROGRESS NOTES
"Bogdan is a 34 y.o. year old female presents to [facility]    Chief Complaint   Patient presents with    Right Hip - Pain, Follow-up     F/u eval for RT hip SI joint pain - here for consideration of US guided steroid injection        History of Present Illness  History of Present Illness  The patient presents for evaluation of chronic pain in the tailbone.    She has been experiencing chronic pain in her tailbone, which is exacerbated when she performs squats. The pain is predominantly on the right side, but she also feels it on both sides. She has received steroid injections at an urgent care center, which provided some relief. She has undergone scans and other diagnostic tests. She has also tried dry needling, which she found helpful. She has not attempted any exercise since the onset of the pain.    ALLERGIES  She denies any allergies to medicine.    I have reviewed the patient's medical, family, and social history in detail and updated the computerized patient record.    /60 (BP Location: Left arm, Patient Position: Sitting, Cuff Size: Adult)   Pulse 74   Resp 16   Ht 167.6 cm (65.98\")   Wt 56.2 kg (124 lb)   LMP 10/29/2024 (Approximate)   SpO2 98%   BMI 20.02 kg/m²      Physical Exam    Vital signs reviewed.   General: No acute distress.  Eyes: conjunctiva clear; pupils equally round and reactive  ENT: external ears atraumatic  CV: no peripheral edema  Resp: normal respiratory effort, no use of accessory muscles  Skin: no rashes or wounds; normal turgor  Psych: mood and affect appropriate; recent and remote memory intact  Neuro: sensation to light touch intact    MSK Exam  Physical Exam  Maximum tenderness noted at the right sacroiliac notch.    MRI Lumbar Spine Without Contrast (07/01/2024)   MRI Pelvis Without Contrast (07/01/2024)     Results      - Injection Tendon or Ligament    Date/Time: 11/26/2024 10:16 AM    Performed by: Jason Ny Jr., " "DO  Authorized by: Jason Ny Jr., DO  Consent: Verbal consent obtained.  Risks and benefits: risks, benefits and alternatives were discussed  Consent given by: patient  Patient consent: the patient's understanding of the procedure matches consent given  Site marked: the operative site was marked  Patient identity confirmed: verbally with patient  Time out: Immediately prior to procedure a \"time out\" was called to verify the correct patient, procedure, equipment, support staff and site/side marked as required.  Preparation: Patient was prepped and draped in the usual sterile fashion.  Local anesthesia used: yes    Anesthesia:  Local anesthesia used: yes  Local Anesthetic: topical anesthetic  Patient tolerance: patient tolerated the procedure well with no immediate complications  Comments: R SIJ  Medications administered: 80 mg triamcinolone acetonide 40 MG/ML  Ultrasound guidance: yes        Diagnoses and all orders for this visit:    Chronic right sacroiliac joint pain      Assessment & Plan  1. Chronic tailbone pain.  A cortisone injection was administered today. She was advised to refrain from exercising for a few days and to avoid squats for approximately a week. A discussion regarding Platelet-Rich Plasma (PRP) therapy was conducted, noting that it is not covered by insurance and costs $400. PRP therapy involves using the patient's own blood platelets to potentially heal the dysfunctional joint. If the cortisone injection provides relief but the pain returns, PRP therapy may be considered as the next step. She was also advised to maintain her protein intake to help preserve muscle mass during periods of reduced physical activity.          I spent 25 minutes caring for Bogdan on this date of service. This was separate from the procedure as documented above. This time includes time spent by me in the following activities:preparing for the visit, reviewing tests, obtaining and/or reviewing a " separately obtained history, performing a medically appropriate examination and/or evaluation , counseling and educating the patient/family/caregiver, documenting information in the medical record, and independently interpreting results and communicating that information with the patient/family/caregiver    Follow Up     Patient was given instructions and counseling regarding her condition or for health maintenance advice. Please see specific information pulled into the AVS if appropriate.     Patient or patient representative verbalized consent for the use of Ambient Listening during the visit with  RICHIE Ny Jr., DO for chart documentation. 11/26/2024  09:18 EST

## 2024-12-19 ENCOUNTER — TELEPHONE (OUTPATIENT)
Dept: OBSTETRICS AND GYNECOLOGY | Age: 34
End: 2024-12-19
Payer: COMMERCIAL

## 2024-12-19 NOTE — TELEPHONE ENCOUNTER
Please call pt and recommend she take a pregnancy test, if positive, advise she call office asap. If negative, recommend she make a f/u appt to re-evaluate. Ok with NP/PA.

## 2024-12-19 NOTE — TELEPHONE ENCOUNTER
Pt left a message two days ago about an concern she is having. Pt states she went without a period for two months and she states she had a period the 1st week of December and hasn't stopped bleeding since. Sometimes it's light and sometimes it's heavy. She states she has to wear a tampon daily. Pt wants to know if this is something she should be concerned about or not?

## 2024-12-20 ENCOUNTER — OFFICE VISIT (OUTPATIENT)
Dept: OBSTETRICS AND GYNECOLOGY | Age: 34
End: 2024-12-20
Payer: COMMERCIAL

## 2024-12-20 VITALS — DIASTOLIC BLOOD PRESSURE: 70 MMHG | WEIGHT: 125.6 LBS | BODY MASS INDEX: 20.28 KG/M2 | SYSTOLIC BLOOD PRESSURE: 102 MMHG

## 2024-12-20 DIAGNOSIS — N92.6 IRREGULAR PERIODS: Primary | ICD-10-CM

## 2024-12-20 DIAGNOSIS — Z13.0 SCREENING FOR IRON DEFICIENCY ANEMIA: ICD-10-CM

## 2024-12-20 DIAGNOSIS — R53.83 OTHER FATIGUE: ICD-10-CM

## 2024-12-20 LAB
ALBUMIN SERPL-MCNC: 4.3 G/DL (ref 3.5–5.2)
ALBUMIN/GLOB SERPL: 1.8 G/DL
ALP SERPL-CCNC: 58 U/L (ref 39–117)
ALT SERPL-CCNC: 11 U/L (ref 1–33)
AST SERPL-CCNC: 15 U/L (ref 1–32)
B-HCG UR QL: NEGATIVE
BASOPHILS # BLD AUTO: 0.05 10*3/MM3 (ref 0–0.2)
BASOPHILS NFR BLD AUTO: 0.5 % (ref 0–1.5)
BILIRUB SERPL-MCNC: 0.4 MG/DL (ref 0–1.2)
BUN SERPL-MCNC: 22 MG/DL (ref 6–20)
BUN/CREAT SERPL: 23.2 (ref 7–25)
CALCIUM SERPL-MCNC: 9.1 MG/DL (ref 8.6–10.5)
CHLORIDE SERPL-SCNC: 103 MMOL/L (ref 98–107)
CO2 SERPL-SCNC: 27.4 MMOL/L (ref 22–29)
CREAT SERPL-MCNC: 0.95 MG/DL (ref 0.57–1)
EGFRCR SERPLBLD CKD-EPI 2021: 80.8 ML/MIN/1.73
EOSINOPHIL # BLD AUTO: 0.09 10*3/MM3 (ref 0–0.4)
EOSINOPHIL NFR BLD AUTO: 0.9 % (ref 0.3–6.2)
ERYTHROCYTE [DISTWIDTH] IN BLOOD BY AUTOMATED COUNT: 11.8 % (ref 12.3–15.4)
EXPIRATION DATE: NORMAL
GLOBULIN SER CALC-MCNC: 2.4 GM/DL
GLUCOSE SERPL-MCNC: 79 MG/DL (ref 65–99)
HCT VFR BLD AUTO: 43.4 % (ref 34–46.6)
HGB BLD-MCNC: 14.5 G/DL (ref 12–15.9)
IMM GRANULOCYTES # BLD AUTO: 0.05 10*3/MM3 (ref 0–0.05)
IMM GRANULOCYTES NFR BLD AUTO: 0.5 % (ref 0–0.5)
INTERNAL NEGATIVE CONTROL: NORMAL
INTERNAL POSITIVE CONTROL: NORMAL
LYMPHOCYTES # BLD AUTO: 1.64 10*3/MM3 (ref 0.7–3.1)
LYMPHOCYTES NFR BLD AUTO: 16.2 % (ref 19.6–45.3)
Lab: NORMAL
MCH RBC QN AUTO: 30.9 PG (ref 26.6–33)
MCHC RBC AUTO-ENTMCNC: 33.4 G/DL (ref 31.5–35.7)
MCV RBC AUTO: 92.3 FL (ref 79–97)
MONOCYTES # BLD AUTO: 0.77 10*3/MM3 (ref 0.1–0.9)
MONOCYTES NFR BLD AUTO: 7.6 % (ref 5–12)
NEUTROPHILS # BLD AUTO: 7.52 10*3/MM3 (ref 1.7–7)
NEUTROPHILS NFR BLD AUTO: 74.3 % (ref 42.7–76)
NRBC BLD AUTO-RTO: 0 /100 WBC (ref 0–0.2)
PLATELET # BLD AUTO: 211 10*3/MM3 (ref 140–450)
POTASSIUM SERPL-SCNC: 4.1 MMOL/L (ref 3.5–5.2)
PROT SERPL-MCNC: 6.7 G/DL (ref 6–8.5)
RBC # BLD AUTO: 4.7 10*6/MM3 (ref 3.77–5.28)
SODIUM SERPL-SCNC: 139 MMOL/L (ref 136–145)
TSH SERPL DL<=0.005 MIU/L-ACNC: 1.36 UIU/ML (ref 0.27–4.2)
WBC # BLD AUTO: 10.12 10*3/MM3 (ref 3.4–10.8)

## 2024-12-20 NOTE — PROGRESS NOTES
Chief Complaint   Patient presents with    Gynecologic Exam     Pt concerned about irregular period's. Has been bleeding for about 2 week's.         HPI  Bogdan Garcia is a 34 y.o. female comes in for evaluation of irregular bleeding. She notes ongoing light flow since her LMP. She is unsure if she had a menses prior month. She [place] complains of fatigue. She notes she has not been sexually active since her separation many months ago.         The following portions of the patient's history were reviewed and updated as appropriate: allergies, current medications, past family history, past medical history, past social history, past surgical history, and problem list.    Review of Systems  Pertinent items are noted in HPI.    /70 (BP Location: Right arm, Patient Position: Sitting, Cuff Size: Adult)   Wt 57 kg (125 lb 9.6 oz)   LMP 12/05/2024 (Exact Date)   BMI 20.28 kg/m²         Physical Exam  Constitutional:       Appearance: Normal appearance.   Pulmonary:      Effort: Pulmonary effort is normal.   Genitourinary:     Comments: Vagina and cervix without lesions. Light dark blood from os. No CMT or uterine tenderness.  Neurological:      General: No focal deficit present.      Mental Status: She is alert and oriented to person, place, and time.   Psychiatric:         Mood and Affect: Mood normal.         Behavior: Behavior normal.             Diagnoses and all orders for this visit:    1. Irregular periods (Primary)  -     POC Pregnancy, Urine  -     TSH    2. Screening for iron deficiency anemia  -     CBC & Differential    3. Other fatigue  -     Comprehensive Metabolic Panel      Urine HCG is negative. Will check TH and added CBC as pt notes fatigue. Will f/u 8 weeks. If still with AUB, plan repeat ECC and add embx. Pt notes agreement.

## 2024-12-26 ENCOUNTER — TELEPHONE (OUTPATIENT)
Dept: FAMILY MEDICINE CLINIC | Facility: CLINIC | Age: 34
End: 2024-12-26

## 2024-12-26 PROBLEM — M54.31 SCIATICA OF RIGHT SIDE: Status: ACTIVE | Noted: 2024-10-02

## 2024-12-26 NOTE — TELEPHONE ENCOUNTER
Hub staff attempted to follow warm transfer process and was unsuccessful     Caller: Bogdan Garcia A    Relationship to patient: Self    Best call back number: [phone]     Patient is needing: HAS EAR PAIN AND NEEDS SAME DAY NONE AVAILABLE PLEASE CALL ANDS ADVISE  WHAT NEXT STEPS SHOULD BE

## 2024-12-26 NOTE — TELEPHONE ENCOUNTER
Called Pt and Lvm advising to go to [facility] for ear pain due to no available appts today or to give a call back to schedule an appt for a different day. Hub to relay

## 2025-01-05 ENCOUNTER — PATIENT MESSAGE (OUTPATIENT)
Dept: OBSTETRICS AND GYNECOLOGY | Age: 35
End: 2025-01-05
Payer: COMMERCIAL

## 2025-01-08 NOTE — PROGRESS NOTES
"Chief Complaint   Patient presents with    Gynecologic Exam     Irregular bleeding        HPI  Bogdan Garcia is a 34 y.o. female presents for GYN visit of ongoing vaginal bleeding. She notes it has not stopped completely but continued light off and on.         The following portions of the patient's history were reviewed and updated as appropriate: allergies, current medications, past family history, past medical history, past social history, past surgical history, and problem list.    Review of Systems  Pertinent items are noted in HPI.    /68   Ht 167.6 cm (66\")   Wt 57.2 kg (126 lb)   LMP 12/05/2024 (Exact Date)   BMI 20.34 kg/m²         Physical Exam  Constitutional:       Appearance: Normal appearance.   Pulmonary:      Effort: Pulmonary effort is normal.   Genitourinary:     Comments: Normal external female genitalia. Normal vagina. Cervix with light dark blood from os. No heavy bleeding noted. No cervical lesions noted.   Neurological:      General: No focal deficit present.      Mental Status: She is alert and oriented to person, place, and time.   Psychiatric:         Mood and Affect: Mood normal.         Behavior: Behavior normal.         Tv u/s from 1/9/25: normal uterus, no focal endometrial or endocervical lesions noted. Ovaries with simple follicular cysts bilaterally, largest on right and 3.1 cm. No free fluid.      Diagnoses and all orders for this visit:    1. Abnormal uterine bleeding (AUB) (Primary)  -     POC Pregnancy, Urine      Urine HCG remains negative. No focal endometrial or endocervical lesions noted on imaging.     ECC and Endometrial Biopsy Procedure Note    Pre-operative Diagnosis: abnormal uterine bleeding    Post-operative Diagnosis: same    Indications: abnormal uterine bleeding    Procedure Details    Urine pregnancy test was done and was NEGATIVE .  The risks (including infection, bleeding, pain, and uterine perforation) and benefits of the procedure were explained to " the patient and  verbal and written  informed consent was obtained.        The patient was placed in the dorsal lithotomy position.  A speculum inserted in the vagina, and the cervix prepped with povidone iodine X 3.      A sharp tenaculum was applied to the anterior lip of the cervix for stabilization.  A Pipelle was used to sound the uterus to a depth of 8.5cm and sample the endometrium using sterile technique.  An endocervical curettage was then obtained. Samples were sent for pathologic examination.    Condition:  Stable    Complications:  None  Patient tolerated the procedure well without complications.    Plan:    The patient was advised to call for any fever or for prolonged or severe pain or bleeding.    Advised Bogdan to call if she does not receive results of biopsies within 10 days  Plan f/u prn or 3 months with menstrual calendar.   Pt declines gardasil vaccine for now

## 2025-01-10 ENCOUNTER — OFFICE VISIT (OUTPATIENT)
Dept: OBSTETRICS AND GYNECOLOGY | Age: 35
End: 2025-01-10
Payer: COMMERCIAL

## 2025-01-10 VITALS
HEIGHT: 66 IN | SYSTOLIC BLOOD PRESSURE: 110 MMHG | BODY MASS INDEX: 20.25 KG/M2 | DIASTOLIC BLOOD PRESSURE: 68 MMHG | WEIGHT: 126 LBS

## 2025-01-10 DIAGNOSIS — N93.9 ABNORMAL UTERINE BLEEDING (AUB): Primary | ICD-10-CM

## 2025-01-15 LAB
PATH REPORT.FINAL DX SPEC: NORMAL
PATH REPORT.GROSS SPEC: NORMAL
PATH REPORT.SITE OF ORIGIN SPEC: NORMAL
PATHOLOGIST NAME: NORMAL
PAYMENT PROCEDURE: NORMAL

## 2025-01-22 ENCOUNTER — TELEPHONE (OUTPATIENT)
Dept: OBSTETRICS AND GYNECOLOGY | Age: 35
End: 2025-01-22

## 2025-01-24 RX ORDER — HYDROCORTISONE 25 MG/G
CREAM TOPICAL
Qty: 30 G | Refills: 0 | Status: SHIPPED | OUTPATIENT
Start: 2025-01-24 | End: 2025-01-24 | Stop reason: SDUPTHER

## 2025-01-24 RX ORDER — HYDROCORTISONE 25 MG/G
CREAM TOPICAL
Qty: 30 G | Refills: 0 | Status: SHIPPED | OUTPATIENT
Start: 2025-01-24

## 2025-01-27 ENCOUNTER — TELEPHONE (OUTPATIENT)
Dept: SPORTS MEDICINE | Facility: CLINIC | Age: 35
End: 2025-01-27
Payer: COMMERCIAL

## 2025-01-27 RX ORDER — METHYLPREDNISOLONE 4 MG/1
TABLET ORAL
Qty: 21 TABLET | Refills: 0 | Status: SHIPPED | OUTPATIENT
Start: 2025-01-27

## 2025-01-27 NOTE — TELEPHONE ENCOUNTER
Patient states that she is having pain again in Right and Left SI joints. She is asking if she needs to come in to be seen or a Steroid pack.   She is requesting a call back.    Please advise

## 2025-02-05 ENCOUNTER — PROCEDURE VISIT (OUTPATIENT)
Dept: SPORTS MEDICINE | Facility: CLINIC | Age: 35
End: 2025-02-05

## 2025-02-05 ENCOUNTER — OFFICE VISIT (OUTPATIENT)
Dept: SPORTS MEDICINE | Facility: CLINIC | Age: 35
End: 2025-02-05
Payer: COMMERCIAL

## 2025-02-05 VITALS
OXYGEN SATURATION: 98 % | HEIGHT: 66 IN | BODY MASS INDEX: 20.25 KG/M2 | HEART RATE: 88 BPM | SYSTOLIC BLOOD PRESSURE: 104 MMHG | DIASTOLIC BLOOD PRESSURE: 60 MMHG | RESPIRATION RATE: 16 BRPM | WEIGHT: 126 LBS

## 2025-02-05 DIAGNOSIS — G89.29 CHRONIC RIGHT SACROILIAC JOINT PAIN: Primary | ICD-10-CM

## 2025-02-05 DIAGNOSIS — M53.3 CHRONIC RIGHT SACROILIAC JOINT PAIN: Primary | ICD-10-CM

## 2025-02-05 PROCEDURE — 99213 OFFICE O/P EST LOW 20 MIN: CPT | Performed by: FAMILY MEDICINE

## 2025-02-05 RX ORDER — EFINACONAZOLE 100 MG/ML
SOLUTION TOPICAL
COMMUNITY
Start: 2025-01-23

## 2025-02-05 RX ORDER — HYDROCORTISONE 25 MG/G
1 CREAM TOPICAL 2 TIMES DAILY
COMMUNITY
Start: 2025-01-23

## 2025-02-05 NOTE — PROGRESS NOTES
Bogdan is a 34 y.o. year old female presents to [facility]    Chief Complaint   Patient presents with    Pelvis - Follow-up, Pain     F/u eval for SI joint pain - states pain is different since last eval, steroids did not help, discuss treatment options - here for further evaluation and treatment        History of Present Illness  History of Present Illness  The patient presents for evaluation of sacroiliac (SI) joint pain.    She reports a history of recurrent flare-ups, with the current episode being particularly severe, characterized by bilateral involvement and intense pain during prolonged sitting or standing. She experiences difficulty in walking, and her usual recovery period of approximately 5 days, often aided by steroids, has not been effective this time. The pain, initially improved for a brief period, has since returned to its original intensity, described as sharp and excruciating. She is unable to work. She notes that the pain is predominantly on the right side when seated but becomes bilateral upon standing, which she attributes to overcompensation. She also reports that the pain was initially located higher up when it began approximately 1.5 weeks ago. She expresses concern about potential progression of her condition, as even lying down does not provide significant relief. She has been unable to resume her gym routine due to the severity of the pain. She is considering Platelet-Rich Plasma (PRP) therapy and is curious about its potential benefits and costs. Her last cortisone injection was administered at least 3 months ago. She reports no significant relief from the steroid treatment, with only a slight improvement noted towards the end of the course, followed by a return to the baseline level of pain.    I have reviewed the patient's medical, family, and social history in detail and updated the computerized patient record.    /60 (BP Location: Left arm,  "Patient Position: Sitting, Cuff Size: Adult)   Pulse 88   Resp 16   Ht 167.6 cm (65.98\")   Wt 57.2 kg (126 lb)   SpO2 98%   BMI 20.35 kg/m²      Physical Exam    Vital signs reviewed.   General: No acute distress.  Eyes: conjunctiva clear; pupils equally round and reactive  ENT: external ears atraumatic  CV: no peripheral edema  Resp: normal respiratory effort, no use of accessory muscles  Skin: no rashes or wounds; normal turgor  Psych: mood and affect appropriate; recent and remote memory intact  Neuro: sensation to light touch intact    MSK Exam  Physical Exam  In the lumbar spine, there is right-sided tenderness at the sacroiliac notch. Negative seated slump test, negative straight leg raise bilaterally, negative BERYL and FADIR bilateral.      MRI Lumbar Spine Without Contrast (07/01/2024)     Results  Imaging  MRI from last year showed very minimal disc change at the lower segment of the lumbar spine.         Diagnoses and all orders for this visit:    Chronic right sacroiliac joint pain    Other orders  -     hydrocortisone 2.5 % cream; Apply 1 Application topically to the appropriate area as directed 2 (Two) Times a Day.  -     Jublia 10 % solution; APPLY TOPICALLY TO THE AFFECTED AREA DAILY AS DIRECTED      Assessment & Plan  1. Sacroiliac (SI) joint pain.  The patient's MRI from the previous year revealed minimal disc changes in the lower lumbar region. The current symptoms may be due to irritation of this disc, rather than the sacroiliac joint. She has not responded to steroid treatment this time. A Platelet-Rich Plasma (PRP) injection will be administered today, targeting the right side. She has been advised to avoid anti-inflammatory medications such as over-the-counter ibuprofen for the next week. However, the use of ice, Tylenol, and heat is permitted. She can remove the Band-Aid upon returning home and is encouraged to maintain mobility. If the PRP injection does not alleviate her symptoms, a " deeper injection into the sacroiliac joint may be considered. Additionally, a referral to pain management may be necessary.    PROCEDURE  The patient received a cortisone injection approximately 3 months ago.    A Platelet-Rich Plasma (PRP) injection was administered today, targeting the right side.          Follow Up     Patient was given instructions and counseling regarding her condition or for health maintenance advice. Please see specific information pulled into the AVS if appropriate.     Patient or patient representative verbalized consent for the use of Ambient Listening during the visit with  RICHIE Ny Jr., DO for chart documentation. 2/5/2025  09:22 EST

## 2025-02-05 NOTE — PROGRESS NOTES
"Ultrasound-Guided Sacroiliac Joint Injection Procedure Note    Right sacroiliac joint injection was discussed with the patient in detail, including indication, risks, benefits, and alternatives. Verbal consent was given for the procedure. Injection was performed by physician.  Injection site was identified by ultrasound examination, then cleaned with Betadine and alcohol swabs. Prior to needle insertion, ethyl chloride spray was used for surface anesthesia. Sterile technique was used. Ultrasound guidance was indicated for injection accuracy.  A 22-gauge, 3.5\" spinal needle was guided to the joint under continuous direct ultrasound visualization. Injectate was seen flowing underneath the superficial sacroiliac ligaments and passed without difficulty. The needle was removed and a simple bandage was applied. The procedure was tolerated well without difficulty.    Injection mixture:  ACP PRP 5 mL  "

## 2025-02-10 NOTE — PROGRESS NOTES
[facility]     Referring Provider: Violet Xiao MD     Chief complaint: breast mass     HPI: Ms. Bogdan Garcia is a 35 yo woman, seen at the request of Violet Xiao MD, for breast mass    I personally reviewed her records and summarized her relevant breast history/imaging:    10/24/2024 bilateral diagnostic mammogram and right limited breast ultrasound at [facility]  The breasts are extremely dense, which lowers the sensitivity of mammography.  There is no radiographic abnormality in the region of the palpable abnormality in the right breast in the upper  outer region and in the axilla region.  In the left breast, no suspicious masses, significant calcifications or other abnormalities are seen.  RIGHT BREAST REALTIME LIMITED BREAST ULTRASOUND  High resolution real-time ultrasound scanning was performed by the ultrasound technologist. Still images were  obtained by the ultrasound technologist and submitted for radiologist review.  There is no sonographic correlate in the area of the palpable abnormality in the right breast in the upper outer  region and in the axilla region.  There is normal tissue seen in the right breast at the 10:30 o'clock, 7  centimeters from the nipple and in the axillary tail region, 10 centimeters from the nipple.  There are no  suspicious masses, areas of focal shadowing or distortion seen.  A simple incidental 2 mm cyst is seen in the axillary tail. Due to size, unlikely to be palpable.  IMPRESSION:  There is no mammographic or sonographic evidence of malignancy. Although the 2mm simple incidental cyst is unlikely  to be palpable due to size, aspiration could be performed for potential patient comfort.  In view of the findings, clinical follow up is recommended for palpable areas of concern.  For women of average lifeftime risk (less than 15%), recommendation is for annual screening mammography beginning  at age 40, unless additional diagnostic imaging is again clinically  indicated sooner. Genetic counseling should be  considered to determine lifetime risk of breast cancer. If determined to be of high risk (20% or greater), patient  would benefit from early initiation of annual screening mammography and adjunctive screening with breast MRI.  Screening mammogram at age 40 is recommended.  The patient was mailed a notification letter.  BI-RADS Category 1: Negative       She has a family history of ovarian cancer in her maternal grandmother dx age 50s.  She denies any family history of breast cancer .     Patient presenting to the office today to follow-up on a lump that Dr. Xiao felt that an office visit in her right outer breast.  She had diagnostic imaging along with an ultrasound which returned as BI-RADS 1 but did show extremely dense breast tissue.  She no longer feels the area of concern that Dr. Xiao had felt.  She is more concerned about how 1 breast is much larger than the other and asking if that can be fixed.  She denies any breast lumps, pain, skin changes, or nipple discharge.  She denies any prior history of abnormal mammograms or breast biopsies.       Review of Systems - Oncology    Medications:    Current Outpatient Medications:     fludrocortisone 0.1 MG tablet, Take 1 tablet by mouth Daily., Disp: 30 tablet, Rfl: 4    hydrocortisone 2.5 % cream, Apply 1 Application topically to the appropriate area as directed 2 (Two) Times a Day., Disp: , Rfl:     Hydrocortisone, Perianal, (Anusol-HC) 2.5 % rectal cream, Apply rectally 2 times daily as needed for up to 5 days, Disp: 30 g, Rfl: 0    Jublia 10 % solution, APPLY TOPICALLY TO THE AFFECTED AREA DAILY AS DIRECTED, Disp: , Rfl:     methylPREDNISolone (MEDROL) 4 MG dose pack, Take as directed on package instructions. (Patient not taking: Reported on 2/11/2025), Disp: 21 tablet, Rfl: 0    Allergies:  Allergies   Allergen Reactions    Monistat [Miconazole] Itching       Medical history:  Past Medical History:   Diagnosis  "Date    Asthma, exercise induced     Injury of back     Injury of neck     Migraine     Nail disorder, unspecified 2023    Shoulder injury     Vaginal delivery     \"William\"       Surgical History:  Past Surgical History:   Procedure Laterality Date    APPENDECTOMY N/A 2019    Procedure: APPENDECTOMY LAPAROSCOPIC;  Surgeon: Lorenzo Pena MD;  Location: [facility] MAIN OR;  Service: General    COLONOSCOPY N/A 2009    Normal-Dr. Jason Vivas    EPIDURAL N/A 2022    Procedure: CERVICAL EPIDURAL STEROID INJECTION;  Surgeon: Bernabe Davidson MD;  [place] [facility] [place];  Service: Pain Management;  Laterality: N/A;       Family History:  Family History   Problem Relation Age of Onset    Diabetes Other     Heart disease Other     Hypertension Other     Other Mother         Phlebitis    Ovarian cancer Maternal Grandmother 50    Diabetes Maternal Grandfather     Coronary artery disease Maternal Grandfather     Heart attack Maternal Grandfather     Stroke Cousin     No Known Problems Son     Drug abuse Father     No Known Problems Sister     Lung cancer Paternal Grandmother     No Known Problems Paternal Grandfather        Social History:   Social History     Socioeconomic History    Marital status: Single   Tobacco Use    Smoking status: Former     Current packs/day: 0.50     Average packs/day: 0.5 packs/day for 10.1 years (5.1 ttl pk-yrs)     Types: Cigarettes, Cigars     Start date: 2015     Passive exposure: Never    Smokeless tobacco: Former   Vaping Use    Vaping status: Never Used   Substance and Sexual Activity    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       GYNECOLOGIC HISTORY:   . P: 1. AB: 1.  Last menstrual period: 2025  Age at menarche: 13  Age at first childbirth: 21  Lactation/How lon months  Age at " menopause: N/A  Total years of oral contraceptive use: N/A  Total years of hormone replacement therapy: N/A      Physical Exam  Vitals:    02/11/25 1022   BP: 118/78   Pulse: 85   SpO2: 95%     ECOG 0 - Asymptomatic  General: NAD, well appearing  Psych: a&o x 3, normal mood and affect  Eyes: EOMI, no scleral icterus  ENMT: neck supple without masses or thyromegaly, mucus membranes moist  Resp: normal effort, CTAB  CV: RRR, no murmurs, no edema   GI: soft, NT, ND  MSK: normal gait, normal ROM in bilateral shoulders  Lymph nodes: no cervical, supraclavicular or axillary lymphadenopathy  Breast: asymmetric, medium, pendulous R>L  Right: No visible abnormalities on inspection while seated, with arms raised or hands on hips. No masses, skin changes, or nipple abnormalities.  Left: No visible abnormalities on inspection while seated, with arms raised or hands on hips. No masses, skin changes, or nipple abnormalities.      Assessment:    Dense breast    Discussion:  Breast density describes how the breasts look on a mammogram.  Breast and connective tissue are denser than fat and this difference shows up on the mammogram.  Young women often have dense breasts.  As we age, breast become less dense.  Dense breast can make it harder to find breast cancer on the mammogram.  Women with high breast density have an increased risk of breast cancer.  Educational materials regarding breast density were given and reviewed.  Tomosynthesis imaging will be completed with next screening study.      Plan:  I do not feel anything abnormal on her exam today therefore I am not can give her a follow-up appointment.  I have suggested that she do monthly self breast exams and if she finds anything concerning in the future I would be happy to see her back.  I have referred her to Dr. Iraheta for discussion for lift and implants        KEILA Amaya    I have spent 45 mins in face to face time with the patient and in chart  review.    CC:  MD Fam Garces Mark A Sr., MD EMR Dragon/transcription disclaimer:  Dictated using Dragon dictation

## 2025-02-11 ENCOUNTER — OFFICE VISIT (OUTPATIENT)
Dept: SURGERY | Facility: CLINIC | Age: 35
End: 2025-02-11
Payer: COMMERCIAL

## 2025-02-11 VITALS
OXYGEN SATURATION: 95 % | HEIGHT: 66 IN | SYSTOLIC BLOOD PRESSURE: 118 MMHG | WEIGHT: 126 LBS | HEART RATE: 85 BPM | DIASTOLIC BLOOD PRESSURE: 78 MMHG | BODY MASS INDEX: 20.25 KG/M2

## 2025-02-11 DIAGNOSIS — R92.30 DENSE BREAST: Primary | ICD-10-CM

## 2025-02-11 DIAGNOSIS — N64.9 BREAST DISORDER: ICD-10-CM

## 2025-02-11 PROCEDURE — 99215 OFFICE O/P EST HI 40 MIN: CPT | Performed by: NURSE PRACTITIONER

## 2025-02-24 ENCOUNTER — TELEPHONE (OUTPATIENT)
Dept: SPORTS MEDICINE | Facility: CLINIC | Age: 35
End: 2025-02-24
Payer: COMMERCIAL

## 2025-02-24 DIAGNOSIS — N30.00 ACUTE CYSTITIS WITHOUT HEMATURIA: ICD-10-CM

## 2025-02-24 DIAGNOSIS — F41.0 PANIC DISORDER: ICD-10-CM

## 2025-02-24 DIAGNOSIS — G43.009 ATYPICAL MIGRAINE: ICD-10-CM

## 2025-02-24 DIAGNOSIS — G90.9 DISORDER OF AUTONOMIC NERVOUS SYSTEM: ICD-10-CM

## 2025-02-24 RX ORDER — METHYLPREDNISOLONE 4 MG/1
TABLET ORAL
Qty: 21 TABLET | Refills: 0 | Status: SHIPPED | OUTPATIENT
Start: 2025-02-24

## 2025-02-24 RX ORDER — FLUDROCORTISONE ACETATE 0.1 MG/1
0.1 TABLET ORAL DAILY
Qty: 30 TABLET | Refills: 4 | Status: SHIPPED | OUTPATIENT
Start: 2025-02-24

## 2025-02-24 NOTE — TELEPHONE ENCOUNTER
Rx Refill Note  Requested Prescriptions     Pending Prescriptions Disp Refills    fludrocortisone 0.1 MG tablet 30 tablet 4     Sig: Take 1 tablet by mouth Daily.      Last office visit with prescribing clinician: 2/5/2024   Last telemedicine visit with prescribing clinician: Visit date not found   Next office visit with prescribing clinician: Visit date not found                         Would you like a call back once the refill request has been completed: [] Yes [] No    If the office needs to give you a call back, can they leave a voicemail: [] Yes [] No    Romana Rodriguez MA  02/24/25, 13:16 EST

## 2025-02-24 NOTE — TELEPHONE ENCOUNTER
Incoming call from the patient stating she is having a flare up of pain and is wanting advice on how to treat her pain or what she should do.   PRP a few weeks ago, has been doing well until she got a stomach flu with n/v causing a flare up. She is leaving for a work trip tomorrow with walking and flights - wants to know how she can treat this, if she needs medication sent, or if she should go to  for a steroid shot but thought the steroid shot isn't something Dr. Ny wanted her to have.   Pharmacy confirmed on file    Thanks  Kylie

## 2025-07-07 ENCOUNTER — OFFICE VISIT (OUTPATIENT)
Dept: FAMILY MEDICINE CLINIC | Facility: CLINIC | Age: 35
End: 2025-07-07
Payer: COMMERCIAL

## 2025-07-07 VITALS
BODY MASS INDEX: 20.25 KG/M2 | SYSTOLIC BLOOD PRESSURE: 110 MMHG | HEIGHT: 66 IN | RESPIRATION RATE: 17 BRPM | DIASTOLIC BLOOD PRESSURE: 60 MMHG | HEART RATE: 58 BPM | WEIGHT: 126 LBS | OXYGEN SATURATION: 98 %

## 2025-07-07 DIAGNOSIS — G43.009 ATYPICAL MIGRAINE: ICD-10-CM

## 2025-07-07 DIAGNOSIS — N30.00 ACUTE CYSTITIS WITHOUT HEMATURIA: ICD-10-CM

## 2025-07-07 DIAGNOSIS — G90.9 DISORDER OF AUTONOMIC NERVOUS SYSTEM: ICD-10-CM

## 2025-07-07 DIAGNOSIS — F41.0 PANIC DISORDER: ICD-10-CM

## 2025-07-07 PROCEDURE — 99213 OFFICE O/P EST LOW 20 MIN: CPT | Performed by: FAMILY MEDICINE

## 2025-07-07 RX ORDER — FLUDROCORTISONE ACETATE 0.1 MG/1
0.1 TABLET ORAL DAILY
Qty: 90 TABLET | Refills: 3 | Status: SHIPPED | OUTPATIENT
Start: 2025-07-07

## 2025-07-07 NOTE — PROGRESS NOTES
Chief Complaint  Chief Complaint   Patient presents with    FMLA     Pt here to get FMLA to take care of family member        Subjective    History of Present Illness        Bogdan Garcia presents to [facility] for   History of Present Illness  The patient presents for FMLA paperwork and medication management.    She is seeking assistance with her FMLA paperwork due to her mother's recent health issues, including a brain bleed, aneurysm, and stroke. Her mother was in the ICU for 2 weeks and was transferred to [facility] last Monday. She is scheduled to be discharged this Wednesday. The patient took 2 weeks of sick leave to care for her mother, who requires constant supervision due to short-term memory loss and limited mobility. She plans to take her mother to outpatient appointments and is seeking job security to ensure she can continue to provide care. She is also exploring options for  services at her mother's home. She is considering applying for intermittent FMLA leave to manage her work commitments while providing care for her mother. She is unsure of the duration of her mother's recovery and is seeking advice on how to proceed. She is also inquiring about the possibility of leaving her current job while on FMLA leave.    She reports that her mother had two brain surgeries following the stroke and aneurysm. The patient started taking sick leave on 06/15/2025, the day her mother went to the emergency room and was admitted to the ICU. She is requesting intermittent FMLA leave for approximately six months to manage her mother's outpatient appointments, including physical therapy, which is expected to be three times a week.    She is requesting an increase in her fludrocortisone dosage to avoid monthly refills.    FAMILY HISTORY  Her mother had a massive brain bleed, aneurysm, and stroke.     History of Present Illness      Objective   Vital Signs:   Visit Vitals  /60  "  Pulse 58   Resp 17   Ht 167.6 cm (65.98\")   Wt 57.2 kg (126 lb)   SpO2 98%   BMI 20.35 kg/m²          BMI is within normal parameters. No other follow-up for BMI required.     Physical Exam  Vitals reviewed.   Constitutional:       Appearance: She is well-developed.   HENT:      Head: Normocephalic.      Right Ear: External ear normal.      Left Ear: External ear normal.      Nose: Nose normal.   Eyes:      Conjunctiva/sclera: Conjunctivae normal.   Cardiovascular:      Rate and Rhythm: Normal rate and regular rhythm.   Pulmonary:      Effort: Pulmonary effort is normal.      Breath sounds: Normal breath sounds.   Musculoskeletal:         General: Normal range of motion.      Cervical back: Normal range of motion and neck supple.   Skin:     General: Skin is warm and dry.      Capillary Refill: Capillary refill takes less than 2 seconds.   Neurological:      Mental Status: She is alert and oriented to person, place, and time.        Physical Exam  Respiratory: Clear to auscultation, no wheezing, rales or rhonchi         Result Review :  Results                            Assessment and Plan      Diagnoses and all orders for this visit:    1. Atypical migraine  Assessment & Plan:    Patient's symptoms are stable.  Patient was given a refill of her medication to help treat her symptoms.  Patient encouraged return to clinic in 4 months for follow-up.          Orders:  -     fludrocortisone 0.1 MG tablet; Take 1 tablet by mouth Daily.  Dispense: 90 tablet; Refill: 3    2. Disorder of autonomic nervous system  -     fludrocortisone 0.1 MG tablet; Take 1 tablet by mouth Daily.  Dispense: 90 tablet; Refill: 3    3. Acute cystitis without hematuria  -     fludrocortisone 0.1 MG tablet; Take 1 tablet by mouth Daily.  Dispense: 90 tablet; Refill: 3    4. Panic disorder  -     fludrocortisone 0.1 MG tablet; Take 1 tablet by mouth Daily.  Dispense: 90 tablet; Refill: 3       Assessment & Plan  1. [facility] paperwork.  - Seeking " assistance with FMLA paperwork due to her mother's recent health issues, including a brain bleed, aneurysm, and stroke.  - Mother was in the ICU for 2 weeks, transferred to [facility] last Monday, and scheduled for discharge this Wednesday.  - Took 2 weeks of sick leave to care for her mother, who requires constant supervision due to short-term memory loss and limited mobility.  - Plans to take her mother to outpatient appointments and is seeking job security to ensure she can continue to provide care. Exploring options for  services at her mother's home. Considering applying for intermittent FMLA leave to manage work commitments while providing care for her mother. Unsure of the duration of her mother's recovery and seeking advice on how to proceed. Inquiring about the possibility of leaving her current job while on FMLA leave. FMLA paperwork will be completed today, allowing her to take up to 8 days off per month for the next 6 months.           Follow Up   No follow-ups on file.  Patient was given instructions and counseling regarding her condition or for health maintenance advice. Please see specific information pulled into the AVS if appropriate.     Patient or patient representative verbalized consent for the use of Ambient List[employer] during the visit with  Boston Otto Sr, MD for chart documentation. 7/21/2025  01:26 EDT

## 2025-07-09 ENCOUNTER — TELEPHONE (OUTPATIENT)
Dept: FAMILY MEDICINE CLINIC | Facility: CLINIC | Age: 35
End: 2025-07-09

## 2025-07-21 NOTE — ASSESSMENT & PLAN NOTE
Patient's symptoms are stable.  Patient was given a refill of her medication to help treat her symptoms.  Patient encouraged return to clinic in 4 months for follow-up.

## (undated) DEVICE — ENDOPOUCH RETRIEVER SPECIMEN RETRIEVAL BAGS: Brand: ENDOPOUCH RETRIEVER

## (undated) DEVICE — Device: Brand: PORTEX

## (undated) DEVICE — SUT VIC 0 TN 27IN DYED JTN0G

## (undated) DEVICE — ENDOPATH XCEL BLADELESS TROCARS WITH STABILITY SLEEVES: Brand: ENDOPATH XCEL

## (undated) DEVICE — GLV SURG TRIUMPH PF LTX 7.5 STRL

## (undated) DEVICE — ENDOPATH PNEUMONEEDLE INSUFFLATION NEEDLES WITH LUER LOCK CONNECTORS 120MM: Brand: ENDOPATH

## (undated) DEVICE — ECHELON FLEX45 ENDOPATH STAPLER, ARTICULATING ENDOSCOPIC LINEAR CUTTER (NO CARTRIDGE): Brand: ECHELON ENDOPATH

## (undated) DEVICE — SUT VIC 5/0 PS2 18IN J495H

## (undated) DEVICE — GOWN ,SIRUS,NONREINFORCED SMALL: Brand: MEDLINE

## (undated) DEVICE — ENDOPATH XCEL UNIVERSAL TROCAR STABLILITY SLEEVES: Brand: ENDOPATH XCEL

## (undated) DEVICE — EPIDURAL TRAY: Brand: MEDLINE INDUSTRIES, INC.

## (undated) DEVICE — ADHS SKIN DERMABOND TOP ADVANCED

## (undated) DEVICE — SKIN PREP TRAY W/CHG: Brand: MEDLINE INDUSTRIES, INC.

## (undated) DEVICE — LOU LAP CHOLE: Brand: MEDLINE INDUSTRIES, INC.

## (undated) DEVICE — GLV SURG BIOGEL LTX PF 6

## (undated) DEVICE — ENDOCUT SCISSOR TIP, DISPOSABLE: Brand: RENEW

## (undated) DEVICE — GLV SURG PREMIERPRO ORTHO LTX PF SZ7.5 BRN

## (undated) DEVICE — NDL EPID TUOHY W/WINGS 20G 3.5IN